# Patient Record
Sex: FEMALE | Race: WHITE
[De-identification: names, ages, dates, MRNs, and addresses within clinical notes are randomized per-mention and may not be internally consistent; named-entity substitution may affect disease eponyms.]

---

## 2018-05-05 ENCOUNTER — HOSPITAL ENCOUNTER (INPATIENT)
Dept: HOSPITAL 25 - ED | Age: 73
LOS: 3 days | Discharge: NURSING, REHAB OR LONG-TERM CARE | DRG: 464 | End: 2018-05-08
Attending: INTERNAL MEDICINE | Admitting: INTERNAL MEDICINE
Payer: MEDICARE

## 2018-05-05 DIAGNOSIS — I69.320: ICD-10-CM

## 2018-05-05 DIAGNOSIS — B96.5: ICD-10-CM

## 2018-05-05 DIAGNOSIS — E66.01: ICD-10-CM

## 2018-05-05 DIAGNOSIS — Q27.39: ICD-10-CM

## 2018-05-05 DIAGNOSIS — Z82.49: ICD-10-CM

## 2018-05-05 DIAGNOSIS — E78.5: ICD-10-CM

## 2018-05-05 DIAGNOSIS — I69.351: ICD-10-CM

## 2018-05-05 DIAGNOSIS — N18.3: ICD-10-CM

## 2018-05-05 DIAGNOSIS — Z79.1: ICD-10-CM

## 2018-05-05 DIAGNOSIS — B95.2: ICD-10-CM

## 2018-05-05 DIAGNOSIS — B96.4: ICD-10-CM

## 2018-05-05 DIAGNOSIS — I48.0: ICD-10-CM

## 2018-05-05 DIAGNOSIS — G47.33: ICD-10-CM

## 2018-05-05 DIAGNOSIS — T84.625A: Primary | ICD-10-CM

## 2018-05-05 DIAGNOSIS — Z88.8: ICD-10-CM

## 2018-05-05 DIAGNOSIS — Z96.653: ICD-10-CM

## 2018-05-05 DIAGNOSIS — Z79.01: ICD-10-CM

## 2018-05-05 DIAGNOSIS — E03.9: ICD-10-CM

## 2018-05-05 DIAGNOSIS — Z66: ICD-10-CM

## 2018-05-05 DIAGNOSIS — Z79.891: ICD-10-CM

## 2018-05-05 DIAGNOSIS — M54.5: ICD-10-CM

## 2018-05-05 DIAGNOSIS — Z79.899: ICD-10-CM

## 2018-05-05 DIAGNOSIS — Z99.3: ICD-10-CM

## 2018-05-05 DIAGNOSIS — M19.90: ICD-10-CM

## 2018-05-05 DIAGNOSIS — E11.42: ICD-10-CM

## 2018-05-05 DIAGNOSIS — E11.22: ICD-10-CM

## 2018-05-05 DIAGNOSIS — T81.30XA: ICD-10-CM

## 2018-05-05 DIAGNOSIS — I12.9: ICD-10-CM

## 2018-05-05 DIAGNOSIS — E11.621: ICD-10-CM

## 2018-05-05 DIAGNOSIS — L97.418: ICD-10-CM

## 2018-05-05 DIAGNOSIS — K59.00: ICD-10-CM
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
## 2018-05-05 DIAGNOSIS — Z79.4: ICD-10-CM

## 2018-05-05 DIAGNOSIS — Z82.3: ICD-10-CM

## 2018-05-05 LAB
BASOPHILS # BLD AUTO: 0.1 10^3/UL (ref 0–0.2)
EOSINOPHIL # BLD AUTO: 0.5 10^3/UL (ref 0–0.6)
HCT VFR BLD AUTO: 36 % (ref 35–47)
HGB BLD-MCNC: 11.8 G/DL (ref 12–16)
INR PPP/BLD: 2.4 (ref 0.77–1.02)
LYMPHOCYTES # BLD AUTO: 1.3 10^3/UL (ref 1–4.8)
MCH RBC QN AUTO: 30 PG (ref 27–31)
MCHC RBC AUTO-ENTMCNC: 33 G/DL (ref 31–36)
MCV RBC AUTO: 93 FL (ref 80–97)
MONOCYTES # BLD AUTO: 0.6 10^3/UL (ref 0–0.8)
NEUTROPHILS # BLD AUTO: 5.2 10^3/UL (ref 1.5–7.7)
NRBC # BLD AUTO: 0 10^3/UL
NRBC BLD QL AUTO: 0.1
PLATELET # BLD AUTO: 181 10^3/UL (ref 150–450)
RBC # BLD AUTO: 3.86 10^6/UL (ref 4–5.4)
WBC # BLD AUTO: 7.7 10^3/UL (ref 3.5–10.8)

## 2018-05-05 PROCEDURE — 88300 SURGICAL PATH GROSS: CPT

## 2018-05-05 PROCEDURE — 87640 STAPH A DNA AMP PROBE: CPT

## 2018-05-05 PROCEDURE — 99285 EMERGENCY DEPT VISIT HI MDM: CPT

## 2018-05-05 PROCEDURE — 87040 BLOOD CULTURE FOR BACTERIA: CPT

## 2018-05-05 PROCEDURE — 87073 CULTURE BACTERIA ANAEROBIC: CPT

## 2018-05-05 PROCEDURE — 85025 COMPLETE CBC W/AUTO DIFF WBC: CPT

## 2018-05-05 PROCEDURE — 85610 PROTHROMBIN TIME: CPT

## 2018-05-05 PROCEDURE — 87070 CULTURE OTHR SPECIMN AEROBIC: CPT

## 2018-05-05 PROCEDURE — 86140 C-REACTIVE PROTEIN: CPT

## 2018-05-05 PROCEDURE — 87205 SMEAR GRAM STAIN: CPT

## 2018-05-05 PROCEDURE — 71045 X-RAY EXAM CHEST 1 VIEW: CPT

## 2018-05-05 PROCEDURE — 87641 MR-STAPH DNA AMP PROBE: CPT

## 2018-05-05 PROCEDURE — 93005 ELECTROCARDIOGRAM TRACING: CPT

## 2018-05-05 PROCEDURE — 36415 COLL VENOUS BLD VENIPUNCTURE: CPT

## 2018-05-05 PROCEDURE — 87186 SC STD MICRODIL/AGAR DIL: CPT

## 2018-05-05 PROCEDURE — 80053 COMPREHEN METABOLIC PANEL: CPT

## 2018-05-05 PROCEDURE — 87077 CULTURE AEROBIC IDENTIFY: CPT

## 2018-05-05 PROCEDURE — 83605 ASSAY OF LACTIC ACID: CPT

## 2018-05-05 PROCEDURE — 80048 BASIC METABOLIC PNL TOTAL CA: CPT

## 2018-05-05 RX ADMIN — INSULIN LISPRO SCH UNIT: 100 INJECTION, SOLUTION INTRAVENOUS; SUBCUTANEOUS at 21:54

## 2018-05-05 RX ADMIN — ATORVASTATIN CALCIUM SCH MG: 40 TABLET, FILM COATED ORAL at 21:54

## 2018-05-05 RX ADMIN — DOCUSATE SODIUM SCH MG: 100 CAPSULE, LIQUID FILLED ORAL at 21:54

## 2018-05-05 RX ADMIN — WATER SCH NOTE: 100 INJECTION, SOLUTION INTRAVENOUS at 21:55

## 2018-05-05 RX ADMIN — HEPARIN SODIUM SCH UNITS: 5000 INJECTION INTRAVENOUS; SUBCUTANEOUS at 21:55

## 2018-05-05 NOTE — RAD
INDICATION:  Left ankle deformity.



COMPARISON: Comparison is made with a prior study from April 11, 2017.



TECHNIQUE: 3 views of the left ankle were obtained.



FINDINGS:  There is diffuse soft tissue swelling. The patient is status post operative

reduction internal fixation. There are metallic plates present along the distal tibia and

fibula transfixed with multiple screws. There is medial dislocation of the talus relative

to the tibia which has progressed slightly from the prior study. The metallic plate

present along the medial aspect of the tibia is eroding into the dome of the talus which

appears similar to the prior study. There is a chronic ununited bony density medial to the

talus likely representing an old fracture fragment which is also unchanged possibly

arising from the medial malleolus.



IMPRESSION:  

1. DIFFUSE SOFT TISSUE SWELLING.

2. MEDIAL DISLOCATION OF THE TALUS RELATIVE TO THE TIBIA WHICH HAS PROGRESSED FROM THE

PRIOR STUDY.

3. EROSION OF THE MEDIAL SURGICAL PLATE INTO THE DOME OF THE TALUS SIMILAR TO THE PRIOR

EXAM.

4. CONSIDER AN UNDERLYING INFECTIOUS PROCESS.

## 2018-05-05 NOTE — HP
CC:  Dr. Khan; Dr. Spivey; Dr. Dodson; Dr. Alex

 

HISTORY AND PHYSICAL:

 

DATE OF ADMISSION:  05/05/18

 

PRIMARY CARE PROVIDER:  Maryam Dodson DO from formerly [facility] as well as Dr. Alex from formerly [facility].

 

CHIEF COMPLAINT:  Left ankle wound.

 

HISTORY OF PRESENT ILLNESS:  Monique Pichardo is a 73-year-old female with history 
of paroxysmal atrial fibrillation as well as expressive aphasia due to 
cardioembolic stroke in 2016, who is status post ORIF of the left ankle also 
noted in 2016.  The patient stated that she had been having more swelling of 
the left ankle and she noted today that her ankle had a wound and you could see 
hardware through that. The patient stated that she had been by Dr. Khan 
recently within approximately 2 weeks for a sore on the bottom of her right foot
, but he did not mention  of  any abnormalities of the left ankle at this point
- as per patient.  Her left ankle has major postsurgical abnormality/deformity 
and that has been chronic.  She has had chronic problems with ambulation  and  
now she is basically nonambulatory.  She has had no problems with fevers and 
she had been feeling at her baseline apart from that, but once again the family 
noted that the wound was opened today.

 

She did state that she gained approximately 15 pounds in the past 4 to 5 months.

 

PAST MEDICAL HISTORY:

1.  History of hypertension.

2.  Diabetes.

3.  History of atrial fibrillation.

4.  History of GI bleed while on Xarelto.  The patient also has history of 
small bowel AVMs.

5.  History of cardioembolic stroke in 2016 with residual mild aphasia and 
right sided weakness

6.  History of ORIF of the left ankle in September of 2016 by Dr. Khan.

7.  Hyperlipidemia.

8.  Obesity.

9.  Chronic back pain.

10.  Hypothyroidism.

11.  History of obstructive sleep apnea.

12.  History of bilateral total knee replacements and revision of the right 
knee.

13.  History of laminectomy at L3-L4.

 

MEDICATIONS:  At [facility] where the patient is resident of 
include:

 

1.  Coumadin 2 mg daily, decreased due to high INR just 3 days ago.

2.  Lidocaine patch 1 patch daily on the left upper back.

3.  Milk of magnesia on a p.r.n. basis.

4.  Vitamin D3 50,000 units monthly.

5.  Toprol XL 25 mg daily.

6.  Levothyroxine 125 mcg daily.

7.  Insulin lispro 4 units subcutaneously in a.m.

8.  Insulin Lantus 26 units daily.

9.  Furosemide 20 mg daily.

10.  Ferrous sulfate 325 mg daily.

11.  Baclofen 5 mg every 4 hours p.r.n.

12.  Lipitor 40 mg daily.

13.  Oxycodone 5 mg every 8 hours p.r.n.

14.  Insulin lispro 6 units b.i.d. with lunch and dinner.

15.  Vitamin C 500 mg b.i.d.

16.  Amiodarone 200 mg daily.

17.  Coumadin 2 mg q.p.m.

18.  MiraLAX 17 g daily.

19.  Acetaminophen 1000 mg at bedtime.

 

ALLERGIES:  Include DILAUDID and XARELTO.  The patient had a GI bleed while on 
XARELTO.

 

FAMILY HISTORY:  Positive for father with history of MI and CVA.

 

SOCIAL HISTORY:  The patient is a long-term resident of [facility]. Her surrogate decision makers are her brother and sister.  She is DNR.

 

REVIEW OF SYSTEMS:  Positive for chronic right bottom of the foot wound.  
Positive for numbness of bilateral feet, which is chronic.  The patient also 
has positive expressive aphasia and chronic right sided weakness due to CVA in 
2016  She is unable to understand everything but has problems with verbalizing 
sometimes.  Positive for chronic lower back.  Positive for 15-pound weight 
gain.  Positive for constipation.  All the remaining 12 systems were reviewed 
with the patient and the patient's family and were otherwise negative.

 

                               PHYSICAL EXAMINATION

 

GENERAL:  The patient is a pleasant 73-year-old female with a BMI of 51.  The 
patient is in no acute distress.  Alert, awake, and oriented x3.  Please note 
that the patient has mixed aphasia.  Sometimes, she has difficulties naming 
objects.

 

VITAL SIGNS:  Blood pressure of 187/93, heart rate of 49 and regular, 
respiratory rate 14, oxygen saturation 96% on room air, temperature of 98.3.

 

HEENT:  Head:  Atraumatic, normocephalic.  Eyes:  Pupils are equal, reactive to 
light and accommodation.  Oropharynx clear.  Mucosa moist.

 

NECK:  Supple.  No JVD.  No bruits bilaterally.

 

RESPIRATORY:  Clear to auscultation bilaterally.

 

CARDIOVASCULAR:  Regular rate and rhythm.  No murmur.

 

ABDOMEN:  Soft, nontender.  Bowel sounds are present in all 4 quadrants.

 

EXTREMITIES:  There is trace bilateral ankle edema.  Pulses +2 bilaterally.  
There is no clubbing or cyanosis. Left ankle with chronic appearing post 
surgical deformity.

 

NEURO EVALUATION:  The patient has mixed aphasia, but is able to make her needs 
known.  She has slight R-sided facial droop and R-sided weakness more than 
left.  Bilateral lower extremities are deconditioned and weak..  Both of the 
lower extremities, she is able to raise against gravity; however, a short 
period of time only.

 

PSYCHIATRIC EVALUATION:  The patient oriented x3 with no evidence of anxiety or 
depression.

 

SKIN:  On evaluation of the skin, the patient has ecchymotic spots on first and 
second toes of bilateral feet that appeared to be posttraumatic.  She has an 
ecchymotic bruise on the medial aspect of the upper left calf that appears to 
be at least several days old.  Overlying the left lateral malleolus, there is 
what appears to be a slit like open area of approximately 3 cm in length and 
approximately 0.5 cm in width, which exposed connective tissue likely related 
to the joint capsule.  There is also a small area of opening that shows exposed 
metalware.  Overall, the area is not draining, no erythema, and no evidence of 
cellulitis.  It resembles an area of an old dehisced wound.  On the bottom of 
the right foot laterally, the patient has a small area of what appears to be 
decubitus ulcer of approximately 1 cm, circular crater- like opening 
unstageable due to slough on the bottom.  The ulcer does not appear to be deep.

 

 DIAGNOSTIC STUDIES/LAB DATA:  Laboratory data showed white blood cell count of 
7.7, hemoglobin is 11.8, hematocrit of 36, and platelets 181.

 

INR was 2.4.

 

Sodium was 140, potassium 4.7, chloride 107, carbon dioxide 26, BUN 50, 
creatinine 1.81, which shows the patient's chronic kidney disease.

 

Portable chest x-ray, impression:  "Small right basilar infiltrate."  Please 
note that the patient has no symptoms of shortness of breath or cough.

 

Ankle x-ray, impression:  "Diffuse soft tissue swelling.  Medial dislocation of 
the talus relative to the tibia, which has progressed from the prior study from 
04/11/17.  Erosion of the medial surgical plate into the dome of the talus 
similar to the prior exam.  Consider an underlying infectious process."

 

ASSESSMENT AND PLAN:

1.  In regards to the patient's ankle wound, it has an exposed hardware.  At 
this point, I discussed briefly with Dr. Spivey, who stated that the patient 
likely will need a surgical intervention.  The patient's INR is therapeutic and 
she needs to have her Coumadin reversed with vitamin K.  She is going to 
receive a dose of vitamin K p.o. today.  Due to that that she is not toxic 
appearing and she has no marked leukocytosis and there is no evidence of 
drainage form the wound, I do not believe that the wound needs to be 
immediately explored.  Dr. Spivey will see the patient in consultation in the 
morning.  For the time being, the patient is going to be treated with Ancef 
intravenously.

2.  In regards to the patient's diabetes, the patient is going to be continued 
on her insulin Lantus as well as insulin sliding scale.

3.  The patient has chronic kidney disease stage 3 due to diabetes.  It is 
chronic with creatinine at baseline.

4.  In regards to the patient's atrial fibrillation, which is paroxysmal. 
Currently, the patient is in normal sinus rhythm.  She is going to be continued 
on amiodarone and metoprolol.

5.  For her hypertension, the patient is going to be placed on continuation of 
metoprolol and amiodarone.  Hydralazine is going to be utilized as needed for 
uncontrolled hypertension.  I believe the patient has elevated systolic blood 
pressures due to stress of being in the emergency department.

6.  For the patient's DVT prophylaxis, the patient currently is anticoagulated.

7.  The patient's code status is do not resuscitate and a MOLST was signed.

 

TIME SPENT:  Approximately 75 minutes were spent on admission of this patient, 
more than half of that time was spent face-to-face with the patient during the 
interview and physical exam.

 

 

 

[id number] #: [id number]

RODRIGUEZ

## 2018-05-05 NOTE — RAD
INDICATION:  Possible sepsis.



COMPARISON:  Correlation is made with a prior study from December 09, 2016.



TECHNIQUE: A portable view of the chest was obtained.



FINDINGS: Cardiac and mediastinal contours appear to be within normal limits.



There is a small infiltrate at the right lung base. The lungs are otherwise clear. No

pleural effusion is seen.



IMPRESSION:  SMALL RIGHT BASILAR INFILTRATE.

## 2018-05-05 NOTE — ED
Aristeo ENGEL Elizabeth, scribed for Júnior Dela Cruz MD on 05/05/18 at 1431 .





Lower Extremity





- HPI Summary


HPI Summary: 


This patient is a 73 year old F presenting to [facility] with a chief complaint of 

an open wound to her left ankle. Patient notes she had surgery to repair a 

deformity to her left ankle 14-15 months ago and the hardware is visible 

through the wound. Symptoms aggravated by nothing. Symptoms alleviated by 

nothing.


Patient reports inability to ambulate. Patient resides at [facility].





- History of Current Complaint


Chief Complaint: EDExtremityLower


Stated Complaint: WOUND


Time Seen by Provider: 05/05/18 13:38


Hx Obtained From: Patient, Family/Caretaker


Onset of Pain: Prior to Arrival


Onset/Duration: Still Present


Severity Currently: Mild


Pain Intensity: 0


Timing: Constant


Location: Is Discrete @ - left ankle


Aggravating Factor(s): Nothing


Alleviating Factor(s): Nothing





- Allergies/Home Medications


Allergies/Adverse Reactions: 


 Allergies











Allergy/AdvReac Type Severity Reaction Status Date / Time


 


hydromorphone Allergy  Altered Verified 05/05/18 13:35





   Mental  





   Status  


 


rivaroxaban [From Xarelto] Allergy  Bleeding Verified 05/05/18 13:35











Home Medications: 


 Home Medications





Acetaminophen [Tylenol Extra Strength] 1,000 mg PO BEDTIME 05/05/18 [History 

Confirmed 05/05/18]


Ascorbic Acid TAB* [Vitamin C  TAB*] 500 mg PO BID 05/05/18 [History Confirmed 

05/05/18]


Atorvastatin* [Lipitor 80 MG*] 40 mg PO QPM 05/05/18 [History Confirmed 05/05/18

]


Baclofen TAB* [Lioresal TAB*] 5 mg PO Q8H PRN 05/05/18 [History Confirmed 05/05/ 18]


Cholecalciferol TAB* [Vitamin D TAB*] 50,000 unit PO MONTHLY 05/05/18 [History 

Confirmed 05/05/18]


Ferrous Sulfate TAB* 325 mg PO DAILY 05/05/18 [History Confirmed 05/05/18]


Furosemide TAB* [Lasix TAB*] 20 mg PO DAILY 05/05/18 [History Confirmed 05/05/18

]


Insulin LISPRO* [HumaLOG*] 4 unit SUBCUT QAM 05/05/18 [History Confirmed 05/05/ 18]


Insulin LISPRO* [HumaLOG*] 6 unit SUBCUT BID WITH MEALS 05/05/18 [History 

Confirmed 05/05/18]


Levothyroxine TAB* [Synthroid TAB*] 125 mcg PO DAILY 05/05/18 [History 

Confirmed 05/05/18]


Lidocaine 4% TOPICAL* [Xylocaine Topical 4%*] 1 patch TOPICAL DAILY 05/05/18 [

History Confirmed 05/05/18]


Magnesium Hydroxide LIQ* [Milk of Magnesia LIQ*] 30 ml PO Q6H PRN 05/05/18 [

History Confirmed 05/05/18]


Metoprolol Succinate XL TAB* [Toprol XL TAB*] 25 mg PO DAILY 05/05/18 [History 

Confirmed 05/05/18]


Polyethylene Glycol 3350* [Miralax*] 17 gm PO DAILY 05/05/18 [History Confirmed 

05/05/18]


Warfarin TAB(*) [Coumadin TAB(*)] 2 mg PO QPM 05/05/18 [History Confirmed 05/05/ 18]











PMH/Surg Hx/FS Hx/Imm Hx


Endocrine/Hematology History: Reports: Hx Diabetes - IDDM, Hx Thyroid Disease - 

hypothyroid, Hx Anemia


Cardiovascular History: Reports: Hx Atrial Fibrillation, Hx Coronary Artery 

Disease, Hx Hypercholesterolemia, Hx Hypertension, Other Cardiovascular Problems

/Disorders - A Fib, myocardial stenosis


   Denies: Hx Pacemaker/ICD


Respiratory History: Reports: Hx Asthma, Hx Sleep Apnea


GI History: Reports: Hx Gastroesophageal Reflux Disease, Hx Gastrointestinal 

Bleed, Other GI Disorders - GI BLEED


 History: Reports: Hx Kidney Infection


   Denies: Hx Dialysis, Hx Renal Disease


Musculoskeletal History: Reports: Hx Arthritis, Other Musculoskeletal History - 

CHRONIC BACK PAIN, bilat knee replacements, recent L ankle fracture


Sensory History: Reports: Hx Cataracts - Implant surgery L eye 2008, Hx 

Contacts or Glasses


   Denies: Hx Hearing Aid, Hx Hearing Problem


Opthamlomology History: Reports: Hx Cataracts - Implant surgery L eye 2008, Hx 

Contacts or Glasses


Neurological History: Reports: Hx CVA - with right-sided sequela, Hx Transient 

Ischemic Attacks (TIA) - "Mini-strokes" 20 yrs ago


   Denies: Hx Dementia, Hx Developmental Delay, Hx Headaches, Hx Migraine, Hx 

Nerve Disease, Hx Seizures, Hx Spinal Cord Injury, Other Neuro Impairments/

Disorders


Psychiatric History: Reports: Hx Anxiety, Hx Depression, Other Psychiatric 

Issues/Disorders - claustrophobia


   Denies: Hx Panic Disorder





- Cancer History


Hx Chemotherapy: No


Hx Radiation Therapy: No





- Surgical History


Surgery Procedure, Year, and Place: R knee replacement 2011, L3/L4 laminectomy.

  left knee replacement 2013.  L ankle ORIF 8/2/16


Hx Anesthesia Reactions: No





- Immunization History


Date of Tetanus Vaccine: 2011


Date of Influenza Vaccine: Fall 2012


Infectious Disease History: Unable to Obtain/Confirm


Infectious Disease History: Reports: Hx Shingles - pt states about 6 months ago

, ON HER BACK


   Denies: Hx Clostridium Difficile, Hx Hepatitis, Hx Human Immunodeficiency 

Virus (HIV), Hx of Known/Suspected MRSA, Hx Tuberculosis, History Other 

Infectious Disease, Traveled Outside the [place] in Last 30 Days





- Family History


Known Family History: Positive: Hypertension, Other - lung cancer (father), 

brain cancer (brother)


   Negative: Cardiac Disease, Diabetes





- Social History


Alcohol Use: None


Substance Use Type: Reports: None


Smoking Status (MU): Former Smoker


Type: Cigarettes


Have You Smoked in the Last Year: No





Review of Systems


Negative: Fever


Negative: Epistaxis


Negative: Abdominal Pain


Positive: Other - pain in left ankle


All Other Systems Reviewed And Are Negative: Yes





Physical Exam





- Summary


Physical Exam Summary: 





Appearance: The patient is well-nourished in no acute distress and in no acute 

pain.


 


Skin: The skin is warm and dry and skin color reflects adequate perfusion.


 


HEENT: The head is normocephalic and atraumatic. The pupils are equal and 

reactive. The conjunctivae are clear and without drainage. Nares are patent and 

without drainage. Mouth reveals moist mucous membranes and the throat is 

without erythema and exudate. The external ears are intact. The ear canals are 

patent and without drainage. The tympanic membranes are intact.


 


Neck: the neck is supple with full range of motion and non-tender. There are no 

carotid bruits. There is no neck vein distension.


 


Respiratory: Chest is non-tender. Lungs are clear to auscultation and breath 

sounds are symmetrical and equal.


 


Cardiovascular: Heart is regular rate and rhythm. There is no murmur or rub 

auscultated. There is no peripheral edema and pulses are symmetrical and equal.


 


Abdomen: The abdomen is soft and non-tender. There are normal bowel sounds 

heard in all four quadrants and there is no organomegaly palpated.


 


Musculoskeletal: There is no back tenderness noted. Extremities are non-tender. 

There is good capillary refill. There is no peripheral edema or calf tenderness 

elicited. Varus deformity of left ankle. The left ankle is lax with a 2-3cm 

open wound over the lateral aspect of the ankle, through which hardware is 

visible.





 


Neurological: Patient is alert and oriented to person, place and time. The 

patient has symmetrical motor strength in all four extremities. Cranial nerves 

are grossly intact. Deep tendon reflexes are symmetrical and equal in all four 

extremities.


 


Psychiatric: The patient has an appropriate affect and does not exhibit any 

anxiety or depression.





Triage Information Reviewed: Yes


Vital Signs On Initial Exam: 


 Initial Vitals











Pulse Pulse Ox


 


 53   96 


 


 05/05/18 13:29  05/05/18 13:29











Vital Signs Reviewed: Yes





Diagnostics





- Vital Signs


 Vital Signs











  Temp Pulse Resp BP Pulse Ox


 


 05/05/18 14:00   48  19   96


 


 05/05/18 13:53  98.7 F  54  14  120/75  95


 


 05/05/18 13:30   50    97


 


 05/05/18 13:29   53    96














- Laboratory


Lab Results: 


 Lab Results











  05/05/18 05/05/18 05/05/18 Range/Units





  14:39 14:39 14:39 


 


WBC  7.7    (3.5-10.8)  10^3/ul


 


RBC  3.86 L    (4.0-5.4)  10^6/ul


 


Hgb  11.8 L    (12.0-16.0)  g/dl


 


Hct  36    (35-47)  %


 


MCV  93    (80-97)  fL


 


MCH  30    (27-31)  pg


 


MCHC  33    (31-36)  g/dl


 


RDW  15    (10.5-15)  %


 


Plt Count  181    (150-450)  10^3/ul


 


MPV  8.5    (7.4-10.4)  um3


 


Neut % (Auto)  68.0    (38-83)  %


 


Lymph % (Auto)  17.5 L    (25-47)  %


 


Mono % (Auto)  7.6 H    (0-7)  %


 


Eos % (Auto)  5.9    (0-6)  %


 


Baso % (Auto)  1.0    (0-2)  %


 


Absolute Neuts (auto)  5.2    (1.5-7.7)  10^3/ul


 


Absolute Lymphs (auto)  1.3    (1.0-4.8)  10^3/ul


 


Absolute Monos (auto)  0.6    (0-0.8)  10^3/ul


 


Absolute Eos (auto)  0.5    (0-0.6)  10^3/ul


 


Absolute Basos (auto)  0.1    (0-0.2)  10^3/ul


 


Absolute Nucleated RBC  0    10^3/ul


 


Nucleated RBC %  0.1    


 


Sodium   140   (139-145)  mmol/L


 


Potassium   4.7   (3.5-5.0)  mmol/L


 


Chloride   107   (101-111)  mmol/L


 


Carbon Dioxide   26   (22-32)  mmol/L


 


Anion Gap   7   (2-11)  mmol/L


 


BUN   50 H   (6-24)  mg/dL


 


Creatinine   1.81 H   (0.51-0.95)  mg/dL


 


Est GFR ( Amer)   35.1   (>60)  


 


Est GFR (Non-Af Amer)   27.3   (>60)  


 


BUN/Creatinine Ratio   27.6 H   (8-20)  


 


Glucose   143 H   ()  mg/dL


 


Lactic Acid    0.8  (0.5-2.0)  mmol/L


 


Calcium   9.2   (8.6-10.3)  mg/dL


 


Total Bilirubin   0.30   (0.2-1.0)  mg/dL


 


AST   11 L   (13-39)  U/L


 


ALT   10   (7-52)  U/L


 


Alkaline Phosphatase   63   ()  U/L


 


C-Reactive Protein   3.75   (< 5.00)  mg/L


 


Total Protein   7.1   (6.4-8.9)  g/dL


 


Albumin   3.5   (3.2-5.2)  g/dL


 


Globulin   3.6   (2-4)  g/dL


 


Albumin/Globulin Ratio   1.0   (1-3)  











Result Diagrams: 


 05/05/18 14:39





 05/05/18 14:39


Lab Statement: Any lab studies that have been ordered have been reviewed, and 

results considered in the medical decision making process.





- Radiology


  ** Ankle Left 3+VWS


Xray Interpretation: Positive (See Comments)


Radiology Interpretation Completed By: Radiologist





  ** CXR


Xray Interpretation: Positive (See Comments) - IMPRESSION:  SMALL RIGHT BASILAR 

INFILTRATE. Dr. Dela Cruz has reviewed this report.


Radiology Interpretation Completed By: Radiologist





Lower Extremity Course/Dx





- Course


Course Of Treatment: Ms. Pichardo presented with a concern for an open wound over 

her left ankle prosthesis laterally.  She is at the nursing home and says that 

they take her brace off at night so she thinks the wound is new. She had 

surgery 14 months ago for a trimalleolar fracture and has had a varus deformity 

since and does not ambulate.   She has a 2-3 cm wound with a small amount of 

thick pus laterally over the ankle, the foot moves freely in relation to the 

ankle and the prosthesis can be seen through the wound. She has normal vitals, 

her labs are normal and her x-ray is read by the radiologist as concerning for 

infection.  Dr. Spivey requests that we have the hospitalist service admit the 

the patient and she will take her to the OR to clean it out tomorrow.  She was 

given Ancef here in the ED.





- Diagnoses


Provider Diagnoses: 


 Wound dehiscence








- Critical Care Time


Critical Care Time: 30-74 min





Discharge





- Sign-Out/Discharge


Documenting (check all that apply): Discharge/Admit/Transfer





- Discharge Plan


Condition: Stable


Disposition: ADMITTED TO [facility]


Referrals: 


Alo Espinosa MD [Primary Care Provider] - 





- Billing Disposition and Condition


Condition: STABLE


Disposition: [facility]





The documentation as recorded by the Aristeo greer Elizabeth accurately 

reflects the service I personally performed and the decisions made by me, Júnior Dela Cruz MD.

## 2018-05-05 NOTE — XMS REPORT
Monique Plascencia

 Created on:2018



Patient:Monique Plascencia

Sex:Female

:1945

External Reference #:[id number]





Demographics







 Address  [address] [place] [address]

 

 Home Phone  [phone]

 

 Mobile Phone  [phone]

 

 Email Address  [email or web address]

 

 Preferred Language  English

 

 Marital Status  Not  Or 

 

 Tenriism Affiliation  Unknown

 

 Race  White

 

 Ethnic Group  Not  Or 









Author







 Organization  [facility]

 

 Address  [address] [address]

 

 Phone  [phone]









Support







 Name  Relationship  Address  Phone

 

 Agustina Xavier  Unavailable  Unavailable  [phone]

 

 Elva Plascencia  Unavailable  Unavailable  [phone]

 

 Matt Salmeron  Unavailable  Unavailable  [phone]

 

 Whitney Sparks  Unavailable  Unavailable  [phone]

 

 Suzie Jesi  Unavailable  Unavailable  [phone]









Care Team Providers







 Name  Role  Phone

 

 Alo Espinosa MD  Care Team Information   Unavailable

 

 Maryam Dodson DO  Primary Care Physician  Unavailable









Payers







 Type  Date  Identification Numbers  Payment Provider  Subscriber

 

 Medicare Primary  Effective:  Policy Number:  Medicare  Monique Plascencia



   1995  [id number]    









 PayID: [id number]  [address]









 [place] [address]









 Medigap Part B  Effective: 2013  Policy Number:  BS Facets  Monique Plascencia



     [id number]    









 Expires: 2017  PayID: [id number]  [address] [address]









 [place] [address]









 Medigap Part B  Effective: 2017  Policy Number: [id number]  Medicaid  
Monique Plascencia









 Group Name: 1   [address]

 

 PayID: [id number]  [place] [address]







Problems







 Date  Description  Provider  Status

 

 Onset: 2013  Atrial fibrillation  Kaz Mayes M.D.  Active

 

 Onset: 2013  Chest pain  Kaz Mayes M.D.  Active

 

 Onset: 2013  Preoperative cardiovascular  Kaz Mayes M.D.  Active



   examination    

 

 Onset: 2013  Abnormal results of cardiovascular  Kaz Mayes M.D.  
Active



   function studies    







Family History







 Date  Family Member(s)  Problem(s)  Comments

 

   General  Lung Cancer  father

 

   General  Arthritis  bother, sister

 

   General  Pulmonary Embolism (Pe)  father







Social History







 Type  Date  Description  Comments

 

 Lives With    Assisted living  

 

 Occupation    Retired  

 

 ETOH Use    Denies alcohol use  

 

 Smoking    Patient is a former smoker  pack and half a day, quit in



       

 

 Recreational Drug Use    Denies Drug Use  

 

 Exercise Type/Frequency    Does not exercise  







Allergies, Adverse Reactions, Alerts







 Date  Description  Reaction  Status  Severity  Comments

 

 2013  Dilaudid    active    







Medications







 Medication  Date  Status  Form  Strength  Qnty  SIG  Indications  Ordering



                 Provider

 

 Oxycodone HCL    Active        up to 6 tabs    Unknown



   /0000          po qd prn    

 

 Gemfibrozil    Active  Tablets  600mg  180ta  1 po bid    Unknown



   /        bs      

 

 Furosemide    Active  Tablets  20mg  90tab  1 po qd    Unknown



   /0000        s      

 

 Lantus    Active  Solution  100Unit/M  6Vial  sliding    Unknown



   /      L  s  scale    

 

 Atorvastatin    Active  Tablets  40mg  90tab  take 1    Unknown



 Calcium  /        s  tablet at    



             bedtime    

 

 Citalopram    Active  Tablets  20mg  30tab  1 po qd    Unknown



 Hydrobromide  /0000        s      

 

 Metoprolol    Active  Tablets  25mg    1/2 tab by    Unknown



 Tartrate  /0000          mouth twice    



             a day    

 

 Amiodarone HCL    Active  Tablets  200mg    1 by mouth    Unknown



   /          every day    

 

 Warfarin Sodium    Active  Tablets  2.5mg        Unknown



   /              

 

 Humalog    Active  Solution  100Unit/M    4u subq 1x a    Unknown



   /0000    Cartridge  L    day 6u subq    



             2x a day    

 

 Milk Of    Active  Suspension  400mg/5ML    30    Unknown



 Magnesia  /0000          milliliters    



             by mouth    



             every day at    



             bedtime as    



             needed for    



             constipation    

 

 Skin Prep Wipes    Active            Unknown



   /0000              

 

 Tylenol Extra    Active  Tablets  500mg    2 by mouth    Unknown



 Strength  /0000          as needed    

 

 Vitamin C    Active  Capsules  500-400mg    1 by mouth    Unknown



   /      -Unit    every day    



 W/Vitamin E                

 

 Vitamin D3    Active  Tablets  15009Oaol    one by mouth    Unknown



 Ultra Potency  /0000          once weekly    

 

 Miconazole    Active  Cream  2%    apply twice    Unknown



 Nitrate  /0000          a day until    



             clear    

 

 Baclofen  00/00  Active    5mg    q 8hrs    Unknown



   /0000              

 

 Calazime Skin    Active  Paste          Unknown



 Protectant/Oliv  /              



 amine                

 

 Hydrocortisone    Active  Ointment  1%        Unknown



   /0000              

 

                 

 

 Methocarbamol    Hx  Tablets  500mg  45tab  one to two    Herbie KAMINSKI



   /        s  up to qid    Jeet,



   -          prn for    M.D.



   10/30          spasm    



   /2013              

 

 Hydrocodone/Ace  10/19  Hx  Tablets  5-325mg  90tab  1-2 po qid    Herbie KAMINSKI



 taminophen          s  prn    Brittney aMrcano              M.DLaisha



   10/31              



   /2013              

 

 Oxycodone HCL    Hx  Tablets  5mg  90tab  1-2 po qid    Herbie KAMINSKI



   /        s  prBrittney Cruz M.D.



   10/19              



   /2010              

 

 Valium    Hx  Tablets  5mg  3tabs  1 po 2 hours    Herbie KAMINSKI



             prior to mri    Jeet,



   -          may take one    M.D.



   10/31          more q 1 hr    



   /          prn anxiety    

 

 Levothyroxine    Hx  Tablets  125mcg  90tab  1 po qd    Unknown



 Sodium  /0000        s      



   -              



   10/07              



   /2017              

 

 Omeprazole    Hx  Capsules DR  20mg  90cap  1 po qd    Unknown



   /0000        s      



   -              



   10/07              



   /2017              

 

 Multivitamins    Hx  Capsules    30cap  1 capsule    Unknown



   /0000        s  dawna;y    



   -              



   10/07              



   /2017              

 

 Gabapentin    Hx  Capsules  100mg  240ca  2 po bid    Unknown



   /0000        ps      



   -              



   10/07              



   /2017              

 

 B-12    Hx    1200mg    1 po qd    Unknown



   /0000              



   -              



   10/07              



   /2017              

 

 Glipizide    Hx  Tablets  5mg  60tab  1 po bid    Unknown



   /0000        s      



   -              



                 

 

 Aspirin Ec    Hx  Tablets DR  81mg  100ta  1 by mouth    Unknown



   /0000        bs  every day    



   -              



   10/07              



   /2017              

 

 Glucosamine    Hx  Capsules  1500Com    bid    Unknown



 Chondroitin  /0000              



 1500 Complex  -              



 Maximum  10/01              



 Strength  /2017              

 

 Celexa  00  Hx  Tablets  10mg    1 by mouth    Unknown



   /0000          every day    



   -              



   10/01              



   /2017              

 

 Docusate Sodium  00  Hx  Capsules  100mg    1 by mouth    Unknown



   /0000          twice a day    



   -              



   10/07              



   /2017              

 

 Humalog Mix    Hx  Suspension  (75-25)10    8 units sq    Unknown



 75/25  /0000      0Unit/ML    twice a day    



   -          with    



   10/17          breakfast    



   /2017          and dinner    

 

 Cipro    Hx  Tablets  500mg    1 by mouth    Unknown



   /0000          twice a day    



   -              



   10/07              



   /2017              

 

 Albuterol  00  Hx  Nebulizer  (2.5mg/3M    1 vial via    Unknown



 Sulfate  /0000      L) 0.083%    nebulizer 4    



   -          times daily    



   10/07          as needed    



                 







Medications Administered in Office







 Medication  Date  Status  Form  Strength  Qnty  SIG  Indications  Ordering



                 Provider

 

 Inj,  11/15/2  Administered  Injection          Kaz D.



 Regadenoson,  013              CAROL Mayes



 0.1 MG                

 

 Technetium TC  11/15/2  Administered  Injection          Kaz DLaisha



 99M  013              CAROL Mayes



 Tetrofosmin,                



 Per Unit Dose                



 Up To 40                



 Millicuries                







Vital Signs







 Date  Vital  Result  Comment

 

 2018  Height  65 inches  5'5"









 Heart Rate  66 /min  

 

 Respiratory Rate  20 /min  

 

 Body Temperature  96.1 F  

 

 Pain Level  0  









 2018  Heart Rate  49 /min  









 BP Systolic Sitting  134 mmHg  

 

 BP Diastolic Sitting  64 mmHg  

 

 Body Temperature  96.5 F  









 10/17/2017  Height  65 inches  5'5"









 Weight  229.00 lb  

 

 BP Systolic  115 mmHg  

 

 BP Diastolic  81 mmHg  

 

 Respiratory Rate  16 /min  

 

 Body Temperature  98.2 F  

 

 BMI (Body Mass Index)  38.1 kg/m2  









 2017  Height  65 inches  5'5"









 Weight  266.00 lb  

 

 BP Systolic  115 mmHg  

 

 BP Diastolic  83 mmHg  

 

 Body Temperature  97.7 F  

 

 Pain Level  5  

 

 BMI (Body Mass Index)  44.3 kg/m2  









 2017  Height  65 inches  5'5"









 Weight  266.00 lb  

 

 Heart Rate  72 /min  

 

 BP Systolic  130 mmHg  

 

 BP Diastolic  82 mmHg  

 

 Respiratory Rate  20 /min  

 

 Body Temperature  96.0 F  

 

 Pain Level  0  

 

 BMI (Body Mass Index)  44.3 kg/m2  









 2017  Height  65 inches  5'5"









 Weight  266.00 lb  

 

 Heart Rate  68 /min  

 

 Respiratory Rate  16 /min  

 

 Body Temperature  97.6 F  

 

 Pain Level  4  

 

 BMI (Body Mass Index)  44.3 kg/m2  









 2017  Height  65 inches  5'5"









 Weight  266.00 lb  

 

 Heart Rate  60 /min  

 

 BP Systolic  130 mmHg  

 

 BP Diastolic  68 mmHg  

 

 Respiratory Rate  16 /min  

 

 Pain Level  1  

 

 BMI (Body Mass Index)  44.3 kg/m2  









 01/10/2017  Height  65 inches  5'5"









 Weight  266.00 lb  

 

 Pain Level  0  

 

 BMI (Body Mass Index)  44.3 kg/m2  









 2016  Height  65 inches  5'5"









 Weight  266.00 lb  

 

 Respiratory Rate  18 /min  

 

 Pain Level  0  

 

 BMI (Body Mass Index)  44.3 kg/m2  









 2016  Respiratory Rate  17 /min  









 Body Temperature  98.8 F  

 

 Pain Level  0  









 11/15/2016  Height  65 inches  5'5"









 Weight  266.00 lb  

 

 Heart Rate  60 /min  

 

 Respiratory Rate  16 /min  

 

 Pain Level  0  

 

 BMI (Body Mass Index)  44.3 kg/m2  









 2016  Height  65 inches  5'5"









 Weight  266.00 lb  

 

 Pain Level  0  

 

 BMI (Body Mass Index)  44.3 kg/m2  









 10/25/2016  Height  65 inches  5'5"









 Weight  266.00 lb  

 

 Heart Rate  60 /min  

 

 Respiratory Rate  16 /min  

 

 Pain Level  0  

 

 BMI (Body Mass Index)  44.3 kg/m2  









 10/20/2016  Height  65 inches  5'5"









 Weight  266.00 lb  

 

 Pain Level  0  

 

 BMI (Body Mass Index)  44.3 kg/m2  









 10/13/2016  Height  65 inches  5'5"









 Weight  266.00 lb  

 

 Body Temperature  95.8 F  

 

 BMI (Body Mass Index)  44.3 kg/m2  









 10/06/2016  Height  65 inches  5'5"









 Weight  266.00 lb  

 

 Pain Level  2  

 

 BMI (Body Mass Index)  44.3 kg/m2  









 2016  Height  65 inches  5'5"









 Weight  266.00 lb  

 

 Heart Rate  56 /min  

 

 BP Systolic  120 mmHg  

 

 BP Diastolic  59 mmHg  

 

 BMI (Body Mass Index)  44.3 kg/m2  









 2014  Height  63 inches  5'3"









 Weight  280.00 lb  with shoes

 

 Heart Rate  64 /min  regular

 

 BP Systolic Sitting  102 mmHg  right arm large cuff

 

 BP Diastolic Sitting  58 mmHg  right arm large cuff

 

 BP Systolic Standing  100 mmHg  right arm large cuff

 

 BP Diastolic Standing  54 mmHg  right arm large cuff

 

 Respiratory Rate  18 /min  

 

 BMI (Body Mass Index)  49.6 kg/m2  









 2013  Height  63 inches  5'3"









 Weight  264.00 lb  

 

 Heart Rate  48 /min  

 

 BP Systolic  104 mmHg  Ra large cuff

 

 BP Diastolic  54 mmHg  Ra large cuff

 

 BP Systolic Sitting  104 mmHg  LA large cuff

 

 BP Diastolic Sitting  56 mmHg  LA large cuff

 

 BP Systolic Standing  100 mmHg  LA

 

 BP Diastolic Standing  54 mmHg  LA

 

 Respiratory Rate  16 /min  

 

 BMI (Body Mass Index)  46.8 kg/m2  







Results







 Test  Date  Test  Result  H/L  Range  Note

 

 Laboratory test  2016  Point of Care Glucose  209 mg/dL  High    1



 finding            

 

 Laboratory test  2016  Point of Care Glucose  182 mg/dL  High    2



 finding            

 

 Creatinine  2012  Creatinine  2.0 mg/dL  High  0.50-1.40  









 One Over Creatinine  0.50      

 

 eGFR Non-  24.9    &gt; 60  

 

 eGFR   32.0    &gt; 60  3









 Comp Metabolic Panel  2010  Sodium  137 mmol/L    135-145  









 Potassium  4.6 mmol/L    3.5-5.0  

 

 Chloride  106 mmol/L    101-111  

 

 Co2 (Carbon Dioxide)  22.0 mmol/L    22-32  

 

 Anion Gap  9.0 mmol/L    2-11  4

 

 Glucose  208 mg/dL  High    5

 

 BUN  32 mg/dL  High  6-24  

 

 Creatinine  1.70 mg/dL  High  0.50-1.40  

 

 One Over Creatinine  0.50      

 

 BUN/Creatinine Ratio  18.8    8-20  

 

 Calcium  8.8 mg/dL    8.1-9.9  6

 

 Total Protein  6.5 GM/DL    6.2-8.1  

 

 Albumin  3.2 GM/DL    3.2-5.2  

 

 Globulin  3.3 GM/DL    2-4  

 

 Albumin/Globulin Ratio  1.0    1-3  

 

 Bilirubin Total  0.5 mg/dL    0.4-1.5  7

 

 Alkaline Phosphatase  70 U/L      

 

 Alt (SGPT)  9 U/L  Low  14-54  

 

 Ast (Sgot)  33 U/L    12-42  

 

 eGFR Non-  32.0    &gt; 60  

 

 eGFR   38.7    &gt; 60  8









 CBC With Electronic Diff  2010  White Blood Count  10.6 CUMM    4.8-10.8
  









 Red Cell Count  2.75 CUMM  Low  4.2-5.4  

 

 Hemoglobin  8.6 g/dL  Low  12.0-16.0  

 

 Hematocrit  25 %  Low  35-47  

 

 Mean Corpuscular Volume  92 um3    79-97  

 

 Mean Corpuscular Hemoglob  31 pg    27-31  

 

 Mean Corpuscular HGB Cone  34 g/dL    32-36  

 

 Redcell Distribution WDTH  15 %    10.5-15  

 

 Platelet Count  252 CUMM    150-450  

 

 Mean Platelet Volume  7.3 um3  Low  7.4-10.4  









 Manual Differential  2010  Polysegmented Neutrophil  78 %    38-83  









 Lymphocyte  17 %  Low  25-47  

 

 Monocyte  4 %    0-13  

 

 Eosinophil  1 %    0-6  

 

 Absolute Neutrophil Count  8.2      

 

 RBC Morphology  NORMAL      









 Urinalysis W/Microscopic  2010  Ua Color  YELLOW    Yellow  









 Appearance-Urine  CLEAR    Clear  

 

 Specific Gravity-Ur  1.014    1.010-1.030  

 

 Esterase-Urine  2+    Negative  

 

 Nitrite  NEGATIVE    Negative  

 

 Urobilinogen-Ur-DIP  NEGATIVE    Negative  

 

 Protein-Urine  NEGATIVE    Negative  

 

 PH-Urine  5.5    5-9  

 

 Blood-Urine  NEGATIVE    Negative  

 

 Ketones-Urine  NEGATIVE    Negative  

 

 Bilirubin-Ur  NEGATIVE    Negative  

 

 Glucose-Urine  NEGATIVE    Negative  

 

 WBC-Urine  20-25    0-5  

 

 RBC-Urine  0-2    0-2  

 

 Epith Cells-Ur  FEW    None  

 

 Bacteria-Urine  4+    None  









 Urine Culture &amp;  2010  Urine Culture  ENTEROBACTER AER &lt;SEE      9



 Sensitivi    Sensitivi  NOTE&gt;      

 

 Anaerobic Culture  2010  Anaerobic Culture  NG5      10



 Bottle    Bottle        

 

 Blood Culture  2010  Aerobic Culture Bottle  NG5      11

 

 Ursc-1  2010  Ampicillin  &gt;=32      









 Amikacin  &lt;=2      

 

 Ciprofloxacin  &lt;=0.25      

 

 Ceftriaxone  8      

 

 Cefazolin  &gt;=64      

 

 Nitrofurantoin  64      

 

 Gentamicin  &lt;=1      

 

 Imipenem  2      

 

 Levofloxacin  &lt;=0.12      

 

 Trimeth-Sulfa  &lt;=20      

 

 Ceftazidime  16      

 

 Tigecycline  &lt;=0.5      

 

 Piperacillin/Tazobactam  8      









 Surgical Pathology  2010  Surgical Pathology  ---------------- &lt;SEE  
    12



       NOTE&gt;      

 

 Laboratory test  2010  Release Date  8/7/10      13, 14



 finding            

 

 Type And Screen  2010  Patient Blood Type  O POSITIVE      13









 Antibody Screen  NEGATIVE      13

 

 Specimen Discard Date  8/12/10      13, 15









 Basic Metabolic Panel  2010  Sodium  141 mmol/L    135-145  13









 Potassium  5.4 mmol/L  High  3.5-5.0  13

 

 Chloride  109 mmol/L    101-111  13

 

 Co2 (Carbon Dioxide)  22.0 mmol/L    22-32  13

 

 Anion Gap  10.0 mmol/L    2-11  13, 16

 

 Glucose  54 mg/dL  Low    13, 17

 

 BUN  49 mg/dL  High  6-24  13

 

 Creatinine  2.23 mg/dL  High  0.50-1.40  13

 

 One Over Creatinine  0.40      13

 

 BUN/Creatinine Ratio  22.0  High  8-20  13

 

 Calcium  10.2 mg/dL  High  8.1-9.9  13, 18

 

 eGFR Non-  23.4    &gt; 60  13

 

 eGFR   28.3    &gt; 60  13, 19









 Laboratory test finding  2010  PTT (Aptt)  29.4    25.15-38.53  13, 20

 

 Protime  2010  Inr  1.05  High  0.97-1.03  13, 21









 Protime  12.4 SEC  High  11.5-12.2  13, 22









 CBC With Electronic Diff  2010  White Blood Count  9.1 CUMM    4.8-10.8  
13









 Red Cell Count  2.77 CUMM  Low  4.2-5.4  13

 

 Hemoglobin  8.6 g/dL  Low  12.0-16.0  13

 

 Hematocrit  26 %  Low  35-47  13

 

 Mean Corpuscular Volume  94 um3    79-97  13

 

 Mean Corpuscular Hemoglob  31 pg    27-31  13

 

 Mean Corpuscular HGB Cone  33 g/dL    32-36  13

 

 Redcell Distribution WDTH  16 %  High  10.5-15  13

 

 Platelet Count  356 CUMM    150-450  13

 

 Mean Platelet Volume  7.1 um3  Low  7.4-10.4  13

 

 Gran %  69.2 %    38-83  13

 

 Lymph %  19.2 %  Low  25-47  13

 

 Mononuclear %  8.0 %    1-9  13

 

 Eosinophil %  3.2 %    0-6  13

 

 Basophil %  0.4 %    0-2  13

 

 Abs Lymphs  1.7    1.0-4.8  13

 

 Abs Mononuclear  0.7    0-0.8  13

 

 Absolute Neutrophil Count  6.3    1.5-7.7  13

 

 Abs Eosinophils  0.3    0-0.6  13

 

 Abs Basophils  0    0-0.2  13, 23









 1  : [id number] JOSE DUKE

 

 2  : [id number] LAPOINT GREYSON

 

 3  *******Because ethnic data is not always readily available,



   this report includes an eGFR for both -Americans and



   non- Americans.****



   The National Kidney Disease Education Program (NKDEP) does



   not endorse the use of the MDRD equation for patients that



   are not between the ages of 18 and 70, are pregnant, have



   extremes of body size, muscle mass, or nutritional status,



   or are non- or non-.



   According to the National Kidney Foundation, irrespective of



   diagnosis, the stage of the disease is based on the level of



   kidney function:



   Stage Description                      GFR(mL/min/1.73 m(2))



   1     Kidney damage with normal or decreased GFR       90



   2     Kidney damage with mild decrease in GFR          60-89



   3     Moderate decrease in GFR                         30-59



   4     Severe decrease in GFR                           15-29



   5     Kidney failure                       &lt;15 (or dialysis)

 

 4  Anion gap measurement may be of limited value in the



   presence of any alkalosis, especially in a combined acid



   base disorder.



   .

 

 5  ** Note change in reference range as of 08.  The



   change was based on recommendations from the American



   Diabetes Association.

 

 6  Please note change in reference range effective 08



   .

 

 7  A metabolite of Naproxen, O-desmethylnaproxen, has been



   shown to interfere with the Jendrassik-Rosa method for



   measuring total bilirubin.  Samples from patients who have



   taken Naproxen have shown spurious elevation in total



   bilirubin levels.

 

 8  *******Because ethnic data is not always readily available,



   this report includes an eGFR for both -Americans and



   non- Americans.****



   The National Kidney Disease Education Program (NKDEP) does



   not endorse the use of the MDRD equation for patients that



   are not between the ages of 18 and 70, are pregnant, have



   extremes of body size, muscle mass, or nutritional status,



   or are non- or non-.



   According to the National Kidney Foundation, irrespective of



   diagnosis, the stage of the disease is based on the level of



   kidney function:



   Stage Description                      GFR(mL/min/1.73 m(2))



   1     Kidney damage with normal or decreased GFR       90



   2     Kidney damage with mild decrease in GFR          60-89



   3     Moderate decrease in GFR                         30-59



   4     Severe decrease in GFR                           15-29



   5     Kidney failure                       &lt;15 (or dialysis)

 

 9  ENTEROBACTER AEROGENES



   100^,000 ORGANISMS/ML (MANY)^CCU



   NORMAL CONCHA



   25^10-25,000 ORGANISMS/ML (MODERATE)^CCU

 

 10  NO GROWTH AFTER 5 DAYS

 

 11  NO GROWTH AFTER 5 DAYS

 

 12  ---------------------------------------------------------------------------
----



   -------------



   



   RUN DATE: 08/12/10               [facility] NMI **LIVE**



   PAGE 1



   RUN TIME: 1215                            Specimen Inquiry



   RUN USER: INTERFACE



   -----------------------------------------------------------------------------
--



   -------------



   



   Name: MONIQUE PLASCENCIA                   Acct#: [id number]      Status: DIS IN



   Re/05/10



   Age/Sex: 65/F                         Unit#: [id number]       Location: [facility]. : 44



   -----------------------------------------------------------------------------
--



   -------------



   



   



   [id number]    [facility]   Spec Date: 08/05/10        [facility] [address]: Herbie navarro MD



   Spec Type: SURGICAL P          Received:  08/05/   Copies to:



   



   



   SPECIMEN



   



   L3 TO L5 LAMINA AND SPINUS PROCESS AND LIGAMENT



   



   HISTORY



   



   PRE-OP DIAGNOSIS:    L3/L5 Lumbar stenosis.



   



   



   



   GROSS DESCRIPTION



   



   The specimen is received in formalin labelled Monique ROWE Marino, L3 to L5



   Lamina and Spinous Process and Ligament, and consists of multiple



   fragments of bone and dense fibrous tissue measuring in aggregate



   5.0 x 4.0 x 2.5 cm. Representative section, one cassette after decal.



   



   ******DIAGNOSIS******



   



   L3-5 lamina and spinous process and ligament, laminectomy:



   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



   changes.



   B)   Normocellular bone marrow with mixed trilineal hematopoiesis.



   C)   Ligament tissue with dense fibrosis and myxoid degeneration.



   



   Signed Electronically by: FLAVIO LEVY MD 08/12/10 1214



   



   -----------------------------------------------------------------------------
--



   -------------



   



   



   



   



   



   



   



   



   



   



   



   



   



   



   



   



   -----------------------------------------------------------------------------
--



   -------------



   



   DEPARTMENT OF PATHOLOGY, [address] [place] [address]



   Phone #[phone]   Fax #[phone]   [facility] Permit #[id number]



   010



   Flavio Levy M.D. Director   Ayo Mckeon M.D. Assistant Dir



   veena



   -----------------------------------------------------------------------------
--



   -------------

 

 13  

 

 14  ANY BLOOD NOT GIVEN WILL BE RELEASED AT 0700 ON THE



   ABOVE DATE UNLESS DOCTOR NOTIFIES LAB OTHERWISE.

 

 15  PREADMISSION TESTING SAMPLES FOR BLOOD BANK WILL BE HELD FOR



   14 DAYS FROM THE DATE OF COLLECTION *IF* THE FOLLOWING



   CRITERIA ARE MET:



   



   1) THE PATIENT HAS *NOT* BEEN PREGNANT IN THE LAST 3 MONTHS.



   2) THE PATIENT HAS *NOT* BEEN TRANSFUSED IN THE LAST 3



   MONTHS.



   ============================================================



   PREADMISSION TESTING SAMPLES WILL *NOT* BE HELD FOR 14 DAYS



   FROM PATIENTS WHO IN THE LAST 3 MONTHS:



   



   1) HAVE BEEN PREGNANT



   2) HAVE BEEN TRANSFUSED



   



   THESE PATIENTS *MUST* BE COLLECTED WITHIN 3 DAYS OF THE



   SURGERY DATE.

 

 16  Anion gap measurement may be of limited value in the



   presence of any alkalosis, especially in a combined acid



   base disorder.



   .

 

 17  ** Note change in reference range as of 08.  The



   change was based on recommendations from the American



   Diabetes Association.

 

 18  Please note change in reference range effective 08



   .

 

 19  *******Because ethnic data is not always readily available,



   this report includes an eGFR for both -Americans and



   non- Americans.****



   The National Kidney Disease Education Program (NKDEP) does



   not endorse the use of the MDRD equation for patients that



   are not between the ages of 18 and 70, are pregnant, have



   extremes of body size, muscle mass, or nutritional status,



   or are non- or non-.



   According to the National Kidney Foundation, irrespective of



   diagnosis, the stage of the disease is based on the level of



   kidney function:



   Stage Description                      GFR(mL/min/1.73 m(2))



   1     Kidney damage with normal or decreased GFR       90



   2     Kidney damage with mild decrease in GFR          60-89



   3     Moderate decrease in GFR                         30-59



   4     Severe decrease in GFR                           15-29



   5     Kidney failure                       &lt;15 (or dialysis)

 

 20  PLEASE NOTE NEW REFERENCE RANGE EFFECTIVE 09.

 

 21  Recommended INR for Patients



   on Oral Anticoagulants



   Prophylaxis                       2.0 - 3.0



   Treatment of thrombosis           2.0 - 3.0



   Prevention of embolism            2.0 - 3.0



   Prevention of embolism from



   prosthetic heart valves         2.5 - 3.5

 

 22  DIAGNOSIS,TREATMENT,AND THERAPY MUST BE BASED ON THE INR



   VALUE ALONE.

 

 23  Lymphopenia %



   Anemia







Procedures







 Date  CPT Code  Description  Status

 

 01/10/2017  15309  Walking Cast  Completed

 

 2016  36922  Walking Cast  Completed

 

 12/10/2016  07649  EKG, Interpretation Only  Completed

 

 2016  14834  Walking Cast  Completed

 

 2016  14437  Walking Cast  Completed

 

 11/15/2016  25672  Walking Cast  Completed

 

 2016  30132  Walking Cast  Completed

 

 10/25/2016  05367  Walking Cast  Completed

 

 10/20/2016  55029  Walking Cast  Completed

 

 10/13/2016  37810  Short Leg Cast  Completed

 

 10/06/2016  08012  Short Leg Cast  Completed

 

 2016  90224  ORIF Open TX Bimalleolar Ankle FX Includes Internal  
Completed



     Fixation  

 

 2016  93407  ORIF Open TX Bimalleolar Ankle FX Includes Internal  
Completed



     Fixation  

 

 2016  34014  EEG Recording Awake &amp; Asleep  Completed

 

 2016  18892  EEG Recording Awake &amp; Drowsy  Completed

 

 2016  40041  EKG, Interpretation Only  Completed

 

 2016  09596  ECHO Transthorasic Realtime 2D W Doppler &amp; Color  
Completed



     [facility]osp  

 

 2014  30200  EKG Tracing &amp; Interpretation  Completed

 

 11/15/2013  56341  Stress Test  Completed

 

 11/15/2013  92971  Myocardial Perfusion Imaging Tomographic (Spect)  Completed



     Multiple Studies  

 

 2013  67648  EKG Tracing &amp; Interpretation  Completed

 

 2013  48245  EKG, Interpretation Only  Completed

 

 2013  86651  ECHO Transthorasic Realtime 2D W Doppler &amp; Color  
Completed



     Flow Hosp  

 

 2010  22920  Díaz/Facet/Foraminotomy;Ea Addl Segment; Cerv, Thora, Or  
Completed



     Lumbar  

 

 2010  43786  Díaz/Facet/Foraminotomy;Vertebral Segment; Lumbar  Completed







Encounters







 Type  Date  Location  Provider  CPT E/M  Dx

 

 Office Visit  2018  [facility] hospital  Linda Rudd NP  04371  M25.572



   8:15a        

 

 Office Visit  2018  [facility]  Naren Khan M.D.  79451  
M19.172



   9:15a  [facility]      

 

 Office Visit  2017  [facility] hospital  Swathi Mallory NP  51424  M25.512



   8:45a        

 

 Office Visit  2017  Critical access hospital  Swathi Mallory NP  26902  I63.10



   8:15a        









 I48.0

 

 Z51.81

 

 Z79.01









 Office Visit  2017  8:30a  [facility] [place]  68399  
S70.312A









 S70.311A









 Office Visit  2017 10:00a  Critical access hospital  Shaka Velasquez MD  79216  
I63.40









 S82.842G

 

 R47.01

 

 Z99.3









 Office Visit  2017  8:15a  Critical access hospital  Shaka Velasquez MD  09998  I48.2









 Z79.01

 

 I10

 

 E03.9

 

 Z79.4

 

 E11.9

 

 E78.5

 

 Z99.3









 Office Visit  2017  8:00a  Critical access hospital  Linda Rudd, ANTWON  93789  R05

 

 Office Visit  10/17/2017  8:45a  [facility]  Naren Khan  37519  
S82.842D



     Of [facility]    









 M14.672









 Office Visit  10/04/2017  8:00a  Critical access hospital  Linda Rudd, ANTWON  13214  
M25.572

 

 Office Visit  2017  8:00a  Critical access hospital  Kaci Ohara M.D.  07751  
I63.10









 I48.0

 

 Z51.81

 

 Z79.01









 Office Visit  2017 10:00a  Critical access hospital  Kaci Ohara M.D.  27393  
I63.10









 I48.0

 

 Z51.81

 

 Z79.01









 Office Visit  2017 10:15a  [facility]  Naren Khan  
29096  M14.672



     [facility]    









 M25.372









 Office Visit  2017 10:30a  [facility]  Naren Khan  29142  
S82.842D



     Of [facility]    

 

 Office Visit  2017 10:00a  [facility]  Naren Khan  43327  
S82.842D



     Of [facility]    

 

 Office Visit  2017  9:30a  [facility]  Naren Khan  09572  
S82.842D



     Of [facility]STI[facility] MED[facility]    

 

 Office Visit  01/10/2017  9:30a  [facility]  Naren Khan  64192  
S82.842D



     Of [facility]    

 

 Office Visit  2016  9:15a  [facility]  Naren Khan  67973  
S82.842D



     Of [facility]    









 S82.842D









 Office Visit  12/15/2016 12:55p  [facility],  Stephanie Varela,  
27499  K92.2



     Kan MDEINA    









 I48.91

 

 E11.8

 

 I10









 [facility]  2016 12:55p  [facility]  Stephanie Varela,  
65070  K92.2



     Hospitalists  MLaishaDLaisha    









 I48.91

 

 E11.8

 

 I10









 Office Visit  2016 12:55p  [facility]  Stephanie Varela,  
42501  K92.2



     Hospitalists  MLaishaDLaisha    









 I48.91

 

 E11.8

 

 I10









 Office Visit  2016 12:54p  [facility]  Stephanie Varela,  
98828  K92.2



     Hospitalists  MLaishaDLaisha    









 I48.91

 

 E11.8









 Office Visit  2016 12:54p  [facility]  Kaci Ohara,  
05529  K92.2



     Hospitalists  MLaishaDLaisha    









 I48.91

 

 E11.8

 

 I10









 Office Visit  12/10/2016 12:53p  [facility]  Kaci Danielshn,  
20247  K92.2



     Hospitalists  MLaishaDLaisha    









 I48.91

 

 E11.8

 

 I10









 Office Visit  2016 12:53p  [facility],  Chuy Cleveland,  
81090  K92.2



     Hospitalists  NLaishaPLaisha    









 I48.91

 

 E11.8

 

 I10









 Office Visit  2016  9:15a  [facility]  Naren Khan,  30249  
S82.842D



     Of [facility]    









 S82.842D









 Office Visit  2016  2:32p  [facility]  Todd Zulay,  16244
  E11.9



     Assoc,pc  [place]    



     Hospitalists      









 Z96.7

 

 Z78.9

 

 Z87.81









 [facility]  2016  2:31p  [facility]  [address]165
  E11.9



     Assoc,pc  [place]    



     Hospitalists      









 Z96.7

 

 Z78.9

 

 Z87.81









 Office Visit  2016  2:28p  [facility]  Todd Ju[place],  51581
  E11.9



     Assoc,pc  [place]    



     Hospitalists      









 Z96.7

 

 Z78.9

 

 Z87.81









 Office Visit  2016  3:50p  [facility]  Iris Spivey,  78903  
S82.842A



     [facility]    

 

 Office Visit  2016  4:13p  [facility]  Naren Khan,  
80543  S82.852A



     [facility]    

 

 Office Visit  2016  9:15a  [facility]  Angeles Ken MD  
42833  I69.320









 I10

 

 S82.842A

 

 Z79.01









 Office Visit  2016  7:00a  [facility]  Iris Spivey,  46401  
S82.842A



     [facility]    

 

 Office Visit  2016  1:49p  [facility],  Chuy Cristofer,  
51098  R47.01



     Hospitalists  N.P.    









 I48.2

 

 N30.01

 

 I63.412









 [facility]  2016  2:43p  [facility]  [address]60  
R47.01



     Assoc, Hospitalists  M.D.    









 I48.2

 

 N30.01

 

 I63.412









 Office Visit  2016 12:44p  [facility],  Don Romero,  
62694  I63.9



     Hospitalists  M.DLaisha    









 E11.9

 

 E66.01

 

 Z79.4









 Office Visit  08/15/2016 12:43p  [facility],  Don Romero,  
04604  I63.9



     Hospitalists  MLaishaDLaisha    









 E11.9

 

 E66.01

 

 Z79.4









 Office Visit  2016 11:33a  [facility]  Francis HANNAH  99827  
I63.512



       CAROL Rosario    









 I65.29

 

 I10

 

 I48.91









 Office [facility]  2016 12:43p  [facility]  Kaci Ohara,  
03049  I63.9



     Hospitalists  M.D.    









 E11.9

 

 E66.01

 

 Z79.4









 Office Visit  2016 12:12p  [facility]  Francis HANNAH  34446  
I63.512



       CAROL Rosario    









 I48.91

 

 I65.29

 

 I10









 Office Visit  2016 12:42p  [facility],  Kaci Ohara,  
49595  I63.9



     Hospitalists  MLaishaDLaisha    









 E11.9

 

 E66.01

 

 Z79.4









 Office Visit  2016 12:42p  [facility]  Shaka Velasquez MD  
44951  I63.9



     Hospitalists      









 E11.9

 

 E66.01

 

 Z79.4









 Office Visit  2016 12:09p  [facility]  Nelia Aden,  
84978  I63.512



       M.DLaisha    









 R25.1

 

 R40.0

 

 I10

 

 I48.91









 Office Visit  2016 12:06p  [facility]  Nelia Aden,  
38350  I63.512



       M.D.    









 I10

 

 I48.91

 

 R25.1

 

 R40.0









 Office Visit  2016 12:41p  [facility],  Shaka Velasquez MD  
07980  I63.9



     Hospitalists      









 E11.9

 

 E66.01

 

 Z79.4









 Office Visit  08/10/2016  1:56p  [facility]  Nelia Aden,  
68689  I63.512



       M.D.    









 I10

 

 I48.91

 

 I65.29









 Office Visit  08/10/2016 12:41p  [facility],  Shaka Velasquez MD  
97838  I63.9



     Hospitalists      









 E11.9

 

 E66.01

 

 Z79.4









 Office Visit  2016 12:40p  [facility],  Shaka Velasquez MD  
65134  I63.9



     Hospitalists      









 E11.9

 

 E66.01

 

 Z79.4









 Office Visit  2016  9:22a  [facility]  Greg Lovepert,  
62919  I63.512



       MD    









 I10

 

 I48.91









 Office Visit  2016 12:39p  [facility],  Sedrick Lara M.D.  
75880  G45.9



     Hospitalists      









 E11.9

 

 E66.01

 

 Z79.4









 Office Visit  2014  9:30a  [facility]   Kaz Mayes  
54379  427.31



     Anthony MEDINA    

 

 Office Visit  2013  1:00p  [facility]   Kaz Mayes  
98625  427.31



     nAthony MEDINA    









 786.50

 

 V72.81

 

 794.39









 Office Visit  2013  3:51p  [facility],vito Lara M.D.  
22415  578.1



     Hospitalists      









 280.0









 Office Visit  2013  3:50p  [facility],vito Lara M.D.  
84713  578.1



     Hospitalists      









 280.0









 Office Visit  2013  3:50p  [facility]  Stephanie Varela,  
45515  578.1



     Hospitalists  M.D.    









 280.0









 Office Visit  2013  3:50p  [facility]  Stephanie Varela,  
58643  578.1



     Hospitalists  M.D.    









 280.0









 Office Visit  2013  3:36p  [facility]  Stephanie Varela,  
24300  285.1



     Hospitalists  M.D.    









 578.9

 

 411.89









 Office Visit  2013  3:36p  [facility]  Stephanie Varela,  
38624  285.1



     Hospitalists  M.D.    









 578.9

 

 411.89









 Office Visit  2013  3:36p  [facility]  Stephanie Varela,  
10518  285.1



     Hospitalists  M.D.    









 578.9

 

 411.89









 Office Visit  2013  3:35p  [facility]  Stephanie Varela,  
30954  285.1



     Hospitalists  M.D.    









 578.9

 

 411.89









 Office Visit  2013  4:07p  [facility]  Keith Mustafa M.D.,  
99766  794.31



     [facility]  FAC, FSCAI    









 411.1

 

 424.1









 Office Visit  2013  3:35p  [facility],  Stephanie Varela,  
39035  285.1



     Hospitalists  M.D.    









 578.9

 

 794.31









 Office Visit  2013  1:20p  [facility]  Herbie Marcano,  
92399  724.02



     Of Anthony  M.DLaisha    

 

 Office Visit  2012  1:20p  [facility]  Herbie Marcano  
20416  724.02



     Of Anthony  M.DLaisha    

 

 Office Visit  2011  3:00p  [facility]  Herbie Marcano  
98929  724.02



     Of Anthony  M.DLaisha    

 

 Office Visit  2011 11:00a  [facility]  Herbie Marcano  
75954  724.02



     Of Anthony  M.DLaisha    

 

 Office Visit  2011 11:40a  [facility]  Herbie Marcano,  
36048  724.02



     Of Anthony  M.D.    

 

 Office Visit  2011  9:40a  [facility]  Herbie Marcano,  
62783  724.02



     Of Anthony MEDINA    

 

 Office Visit  2010  3:40p  [facility]  Herbie Marcano,  
40498  724.02



     Of Anthony  [facility]    

 

 Office Visit  2010 10:40a  [facility]  Herbie Marcano,  
16813  724.02



     Of Anthony MEDINA    

 

 Office Visit  06/15/2010  1:40p  [facility]  Herbie Marcano,  
90966  781.2



     Of Anthony MEDINA    

 

 Office Visit  2010  9:20a  [facility]  Herbie Marcano,  
46830  781.2



     Of Anthony MEDINA    

 

 Office Visit  2010  1:40p  [facility]  Herbie Marcano,  
10518  781.2



     Of Anthony MEDINA    







Plan of Care

Future Appointment(s):2018  9:30 am - [facility]2018 - Naren Khan M.D.M14.622 Charcot's joint, 
left elbowFollow up:3 months

## 2018-05-06 LAB — INR PPP/BLD: 1.76 (ref 0.77–1.02)

## 2018-05-06 RX ADMIN — OXYCODONE HYDROCHLORIDE PRN MG: 5 CAPSULE ORAL at 21:33

## 2018-05-06 RX ADMIN — HEPARIN SODIUM SCH UNITS: 5000 INJECTION INTRAVENOUS; SUBCUTANEOUS at 05:51

## 2018-05-06 RX ADMIN — CEFAZOLIN SCH MLS/HR: 330 INJECTION, POWDER, FOR SOLUTION INTRAMUSCULAR; INTRAVENOUS at 05:43

## 2018-05-06 RX ADMIN — OXYCODONE HYDROCHLORIDE PRN MG: 5 CAPSULE ORAL at 01:58

## 2018-05-06 RX ADMIN — INSULIN GLARGINE SCH UNIT: 100 INJECTION, SOLUTION SUBCUTANEOUS at 18:17

## 2018-05-06 RX ADMIN — INSULIN LISPRO SCH UNIT: 100 INJECTION, SOLUTION INTRAVENOUS; SUBCUTANEOUS at 13:39

## 2018-05-06 RX ADMIN — OXYCODONE HYDROCHLORIDE PRN MG: 5 CAPSULE ORAL at 11:30

## 2018-05-06 RX ADMIN — CEFAZOLIN SCH MLS/HR: 330 INJECTION, POWDER, FOR SOLUTION INTRAMUSCULAR; INTRAVENOUS at 17:10

## 2018-05-06 RX ADMIN — HEPARIN SODIUM SCH UNITS: 5000 INJECTION INTRAVENOUS; SUBCUTANEOUS at 13:39

## 2018-05-06 RX ADMIN — HEPARIN SODIUM SCH UNITS: 5000 INJECTION INTRAVENOUS; SUBCUTANEOUS at 21:33

## 2018-05-06 RX ADMIN — INSULIN LISPRO SCH UNIT: 100 INJECTION, SOLUTION INTRAVENOUS; SUBCUTANEOUS at 18:17

## 2018-05-06 RX ADMIN — INSULIN LISPRO SCH: 100 INJECTION, SOLUTION INTRAVENOUS; SUBCUTANEOUS at 08:11

## 2018-05-06 RX ADMIN — AMIODARONE HYDROCHLORIDE SCH MG: 200 TABLET ORAL at 08:51

## 2018-05-06 RX ADMIN — POLYETHYLENE GLYCOL 3350 SCH GM: 17 POWDER, FOR SOLUTION ORAL at 08:51

## 2018-05-06 RX ADMIN — DOCUSATE SODIUM SCH MG: 100 CAPSULE, LIQUID FILLED ORAL at 21:33

## 2018-05-06 RX ADMIN — WATER SCH NOTE: 100 INJECTION, SOLUTION INTRAVENOUS at 21:33

## 2018-05-06 RX ADMIN — ATORVASTATIN CALCIUM SCH MG: 40 TABLET, FILM COATED ORAL at 17:10

## 2018-05-06 RX ADMIN — DOCUSATE SODIUM SCH MG: 100 CAPSULE, LIQUID FILLED ORAL at 08:52

## 2018-05-06 RX ADMIN — INSULIN LISPRO SCH: 100 INJECTION, SOLUTION INTRAVENOUS; SUBCUTANEOUS at 21:27

## 2018-05-06 RX ADMIN — LEVOTHYROXINE SODIUM SCH MCG: 125 TABLET ORAL at 05:50

## 2018-05-06 RX ADMIN — LIDOCAINE SCH PATCH: 50 PATCH CUTANEOUS at 09:46

## 2018-05-06 RX ADMIN — METOPROLOL SUCCINATE SCH MG: 25 TABLET, EXTENDED RELEASE ORAL at 09:46

## 2018-05-06 RX ADMIN — FERROUS SULFATE TAB 325 MG (65 MG ELEMENTAL FE) SCH MG: 325 (65 FE) TAB at 08:51

## 2018-05-06 RX ADMIN — FUROSEMIDE SCH MG: 40 TABLET ORAL at 08:51

## 2018-05-06 NOTE — PN
Subjective


Date of Service: 05/06/18


Interval History: 


Pt feels well. No new complaints





Objective


Active Medications: 








Acetaminophen (Tylenol Tab*)  650 mg PO Q4H PRN


   PRN Reason: FEVER/PAIN


   Last Admin: 05/06/18 08:50 Dose:  650 mg


Al Hydrox/Mg Hydrox/Simethicone (Maalox Plus*)  30 ml PO Q6H PRN


   PRN Reason: INDIGESTION


Amiodarone HCl (Cordarone Tab*)  200 mg PO DAILY [facility]


   Last Admin: 05/06/18 08:51 Dose:  200 mg


Atorvastatin Calcium (Lipitor*)  40 mg PO QPM [facility]


   Last Admin: 05/05/18 21:54 Dose:  40 mg


Baclofen (Lioresal Tab*)  5 mg PO Q8H PRN


   PRN Reason: SPASMS - MUSCLE


Dextrose (D50w Syringe 50 Ml*)  12.5 gm IV PUSH .FOR FS < 60 - SS PRN


   PRN Reason: FS < 60


Docusate Sodium (Colace Cap*)  100 mg PO BID [facility]


   Last Admin: 05/06/18 08:52 Dose:  100 mg


Ferrous Sulfate (Ferrous Sulfate Tab*)  325 mg PO DAILY [facility]


   Last Admin: 05/06/18 08:51 Dose:  325 mg


Furosemide (Lasix Tab*)  20 mg PO DAILY [facility]


   Last Admin: 05/06/18 08:51 Dose:  20 mg


Heparin Sodium (Porcine) (Heparin Vial(*))  5,000 units SUBCUT Q8HR [facility]


   Last Admin: 05/06/18 05:51 Dose:  5,000 units


Hydralazine HCl (Apresoline Iv*)  5 mg IV SLOW PU Q6H PRN


   PRN Reason: BLOOD PRESSURE


Cefazolin Sodium 1 gm/ Sodium (Chloride)  50 mls @ 200 mls/hr IVPB 0500,1700 [facility]


   Last Admin: 05/06/18 05:43 Dose:  200 mls/hr


Insulin Glargine (Lantus(*))  26 units SUBCUT Q24H [facility]


   Last Admin: 05/05/18 21:54 Dose:  26 unit


Insulin Human Lispro (Humalog*)  0 units SUBCUT ACHS [facility]


   PRN Reason: Protocol


   Last Admin: 05/06/18 08:11 Dose:  Not Given


Levothyroxine Sodium (Synthroid Tab*)  125 mcg PO 0600 [facility]


   Last Admin: 05/06/18 05:50 Dose:  125 mcg


Lidocaine (Lidoderm 5% Patch*)  1 patch TRANSDERM DAILY [facility]


   Last Admin: 05/06/18 09:46 Dose:  1 patch


Metoprolol Succinate (Toprol Xl Tab*)  25 mg PO DAILY [facility]


   Last Admin: 05/06/18 09:46 Dose:  25 mg


Oxycodone HCl (Roxycodone Tab*)  5 mg PO Q8H PRN


   PRN Reason: SEVERE PAIN


   Last Admin: 05/06/18 01:58 Dose:  5 mg


Pharmacy Profile Note (Lidocaine Patch Remove*)  1 note N/A 2100 [facility]


   Last Admin: 05/05/18 21:55 Dose:  1 note


Polyethylene Glycol/Electrolytes (Miralax*)  17 gm PO DAILY [facility]


   Last Admin: 05/06/18 08:51 Dose:  17 gm








 Vital Signs - 8 hr











  05/06/18 05/06/18 05/06/18





  04:07 05:46 07:40


 


Temperature 97.6 F  97.8 F


 


Pulse Rate 44  48


 


Respiratory 16 16 18





Rate   


 


Blood Pressure 122/44  138/67





(mmHg)   


 


O2 Sat by Pulse 98  98





Oximetry   











Oxygen Devices in Use Now: None


Appearance: 72 yo f in nAD, aAOx3, notable expressive aphasia


Eyes: No Scleral Icterus, PERRLA


Ears/Nose/Mouth/Throat: NL Teeth, Lips, Gums, Mucous Membranes Moist


Neck: NL Appearance and Movements; NL JVP, Trachea Midline


Respiratory: Symmetrical Chest Expansion and Respiratory Effort, Clear to 

Auscultation


Cardiovascular: NL Sounds; No Murmurs; No JVD, RRR


Abdominal: NL Sounds; No Tenderness; No Distention, No Hepatosplenomegaly


Lymphatic: No Cervical Adenopathy


Extremities: No Clubbing, Cyanosis, - - trace b/l pedal edema


Skin: No Nodules or Sclerosis, - - left lateral malleolus wound at 3-4 cm in 

lenght with visible metalware on bottom of wound. R lateral bottom foot-1 cm 

crater like shallow decubitus -unstageable due to slugh at bottom of wound


Neurological: Alert and Oriented x 3, - - genralized weakness b/l LE's, mixed 

aphsia, mile r facila droop-chronic


Result Diagrams: 


 05/05/18 14:39





 05/05/18 14:39


Additional Lab and Data: 


 Lab Results











  05/05/18 05/05/18 05/05/18 Range/Units





  14:39 14:39 14:39 


 


WBC  7.7    (3.5-10.8)  10^3/ul


 


RBC  3.86 L    (4.0-5.4)  10^6/ul


 


Hgb  11.8 L    (12.0-16.0)  g/dl


 


Hct  36    (35-47)  %


 


MCV  93    (80-97)  fL


 


MCH  30    (27-31)  pg


 


MCHC  33    (31-36)  g/dl


 


RDW  15    (10.5-15)  %


 


Plt Count  181    (150-450)  10^3/ul


 


MPV  8.5    (7.4-10.4)  um3


 


Neut % (Auto)  68.0    (38-83)  %


 


Lymph % (Auto)  17.5 L    (25-47)  %


 


Mono % (Auto)  7.6 H    (0-7)  %


 


Eos % (Auto)  5.9    (0-6)  %


 


Baso % (Auto)  1.0    (0-2)  %


 


Absolute Neuts (auto)  5.2    (1.5-7.7)  10^3/ul


 


Absolute Lymphs (auto)  1.3    (1.0-4.8)  10^3/ul


 


Absolute Monos (auto)  0.6    (0-0.8)  10^3/ul


 


Absolute Eos (auto)  0.5    (0-0.6)  10^3/ul


 


Absolute Basos (auto)  0.1    (0-0.2)  10^3/ul


 


Absolute Nucleated RBC  0    10^3/ul


 


Nucleated RBC %  0.1    


 


Sodium   140   (139-145)  mmol/L


 


Potassium   4.7   (3.5-5.0)  mmol/L


 


Chloride   107   (101-111)  mmol/L


 


Carbon Dioxide   26   (22-32)  mmol/L


 


Anion Gap   7   (2-11)  mmol/L


 


BUN   50 H   (6-24)  mg/dL


 


Creatinine   1.81 H   (0.51-0.95)  mg/dL


 


Est GFR ( Amer)   35.1   (>60)  


 


Est GFR (Non-Af Amer)   27.3   (>60)  


 


BUN/Creatinine Ratio   27.6 H   (8-20)  


 


Glucose   143 H   ()  mg/dL


 


Lactic Acid    0.8  (0.5-2.0)  mmol/L


 


Calcium   9.2   (8.6-10.3)  mg/dL


 


Total Bilirubin   0.30   (0.2-1.0)  mg/dL


 


AST   11 L   (13-39)  U/L


 


ALT   10   (7-52)  U/L


 


Alkaline Phosphatase   63   ()  U/L


 


C-Reactive Protein   3.75   (< 5.00)  mg/L


 


Total Protein   7.1   (6.4-8.9)  g/dL


 


Albumin   3.5   (3.2-5.2)  g/dL


 


Globulin   3.6   (2-4)  g/dL


 


Albumin/Globulin Ratio   1.0   (1-3)  














Assess/Plan/Problems-Billing


Assessment: 72 yo F with h/o cardioembolic CVA in 2016 and subsequent L ankle 

fx (s/p ORIF by Dr. Khan in 2016), DM ,obesity, PAF, ambulatory dysfucntion, 

chronic R sided weakness and expressive aphasia presents with wound noted on 

left ankle











- Patient Problems


(1) Unspecified open wound, left ankle, subsequent encounter


Comment: Pt had ORIF on left ankle in 08/2016. due to DM neuropathy her 

sensation is very diminished. Her family noted and opened wound on 5/5/18 and 

[place] [facility] [place]/o c/o pain /fevers or discharge from wound. CRP 3


will cont Ancef.


Plan for OR in AM with Dr. Khan   





(2) CKD stage 3 due to type 2 diabetes mellitus


Comment: creat at baseline   





(3) CVA (cerebral vascular accident)


Comment: - L parietal CVA with expressive/receptive aphasia in 08/2016, neuro 

deficits at baseline.


    





(4) Diabetes


Comment: Continue Lantus and ISS   





(5) Hypothyroidism


Comment: Continue home synthroid


   





(6) DVT prophylaxis


Comment: INR 1.7 today. will d/c with ortho is further tx with vit K needed.


will start SCDs

## 2018-05-06 NOTE — CONS
INPATIENT ORTHOPEDIC CONSULTATION NOTE:

 

DATE OF CONSULT:  05/06/18.

 

ATTENDING SURGEON:  Iris Spivey MD.

 

Thank you for this orthopedic consultation.

 

CHIEF COMPLAINT:  Left ankle wound.

 

HISTORY OF PRESENT ILLNESS:  Ms. Pichardo is a 73-year-old female with multiple 
medical comorbidities.  She presented to the emergency room in the evening on 05
/05/18 with a new open wound along the left lateral ankle.  The patient is seen 
by Dr. Khan over the last few years for the ankle fracture that went on to 
heal poorly due to osteoporosis.

 

The patient has been mainly in a wheelchair.  She denies having any open wounds 
along the left ankle.  Yesterday, it was noted by the patient and her daughter 
that she had a 3-cm open wound with visible hardware.  She called our answering 
service and we asked her to come into the ED.  The patient reports some dense 
neuropathy and decreased sensation in that ankle.  She has no significant pain.

 

PAST MEDICAL HISTORY:  Hypertension, diabetes, atrial fibrillation, GI bleed on 
Xarelto, small bowel AVMs, cardioembolic stroke in 2016 with mild aphasia and 
right- sided weakness. Left ankle fracture with ORIF in 2016 with Dr. Khan. 
Hyperlipidemia, morbid obesity, chronic back pain, hypothyroidism, obstructive 
sleep apnea, osteoarthritis, and lower back pain.

 

PAST SURGICAL HISTORY:  ORIF of the left ankle in 2016, Dr. Khan.  Bilateral 
knee replacements, history of lumbar laminectomy.

 

MEDICATIONS:

1.  Coumadin 2 mg p.o. daily.

2.  Lidocaine patch to the left upper back daily.

3.  Milk of mag p.r.n.

4.  Vitamin D3 of 50,000 units q. monthly.

5.  Toprol-XL 25 mg p.o. daily.

6.  Levothyroxine 125 mcg p.o. daily.

7.  Insulin lispro 4 units in the a.m.

8.  Lantus 26 units daily.

9.  Furosemide 20 mg p.o. daily.

10.  Baclofen 5 mg q.4 hours p.r.n.

11.  Lipitor 40 mg p.o. daily.

12.  Oxycodone 5 mg p.o. q.8 hours p.r.n.

13.  Insulin lispro 6 units b.i.d. with lunch and dinner.

14.  Amiodarone 200 mg p.o. daily.

 

ALLERGIES:  DILAUDID, XARELTO.  She had a GI bleed while on XARELTO.

 

FAMILY HISTORY:  Paternal MI and CVA.

 

SOCIAL HISTORY:  The patient is a resident of [facility].  She 
has a brother and sister, who are her surrogate decision makers.  She is DNR.  
She has not ambulated since the ankle fracture.  She is largely in a 
wheelchair.  No tobacco, alcohol, or recreational drug use.

 

REVIEW OF SYSTEMS:  A 14 systems reviewed with the patient.  Positive for 
chronic wound on her right foot plantar aspect, positive for the left open 
wound.  Positive for dense bilateral neuropathy.  Positive for right-sided 
weakness.  Positive for expressive aphasia, chronic low back pain, weight gain, 
constipation.  Otherwise, the patient reports review of systems is negative or 
not relevant.

 

PHYSICAL EXAM:  Temperature 97.8, heart rate 44, blood pressure 156/61.  General
: The patient is a morbidly obese female, lying in bed.  She is alert and 
oriented x3.  Pleasant mood, appropriate affect.  Some expressive aphasia is 
noted at times. Left lower extremity, she has obvious deformity at the ankle, 
which is chronic with healed incisions, laterally she has a 3 x 2 cm area with 
some necrotic fat.  There is no significant surrounding cellulitis.  She has 
dense neuropathy and cannot feel sensation of light touch around this.  There 
is minimal drainage.  When the necrotic fat is cleared away, there is a visible 
screw head.  She has a 2+ palpable DP pulse.  She does not have active 
dorsiflexion, plantar flexion at the ankle.

 

DIAGNOSTIC STUDIES/LAB DATA:  Laboratory studies from 05/05/18 show white blood 
cell 7.7, hematocrit 36, platelets 181.  INR was 2.4; today on 05/06/18 is 
1.76. Sodium 140, potassium 4.7, chloride 107, BUN and creatinine 50 and 1.81.  
CRP 3.75.

 

Radiographs:  Multiple views of the patient's left ankle are reviewed.  There 
is hardware visible from prior ankle ORIF.  There is severe deformation of this 
ankle.

 

ASSESSMENT AND PLAN:  Ms. Pichardo is a 73-year-old female with a new open wound 
on her left lateral ankle.  She has dense neuropathy.  This does not appear to 
be infected at this point.

 

The patient is a longstanding patient of Dr. Khan, so I did give him a call.  
He liked her to be n.p.o. after midnight and he will take her to the operating 
room for a formal debridement, possible closure, possible wound VAC placement 
tomorrow on 05/07/18.

 

I discussed this option with the patient and she is in favor of it.  She will 
be optimized for surgery.  Of course, Coumadin will be held and she will likely 
get a small dose of vitamin K as well.  Until then she should have p.r.n. 
analgesia.  She is currently on IV antibiotics.  She will be on bedrest.

 

 [id number]/[facility] #: [id number]

[facility]

## 2018-05-07 LAB
BASOPHILS # BLD AUTO: 0.1 10^3/UL (ref 0–0.2)
EOSINOPHIL # BLD AUTO: 0.5 10^3/UL (ref 0–0.6)
HCT VFR BLD AUTO: 33 % (ref 35–47)
HGB BLD-MCNC: 10.9 G/DL (ref 12–16)
INR PPP/BLD: 1.23 (ref 0.77–1.02)
LYMPHOCYTES # BLD AUTO: 1.5 10^3/UL (ref 1–4.8)
MCH RBC QN AUTO: 31 PG (ref 27–31)
MCHC RBC AUTO-ENTMCNC: 33 G/DL (ref 31–36)
MCV RBC AUTO: 92 FL (ref 80–97)
MONOCYTES # BLD AUTO: 0.6 10^3/UL (ref 0–0.8)
NEUTROPHILS # BLD AUTO: 4.3 10^3/UL (ref 1.5–7.7)
NRBC # BLD AUTO: 0 10^3/UL
NRBC BLD QL AUTO: 0
PLATELET # BLD AUTO: 165 10^3/UL (ref 150–450)
RBC # BLD AUTO: 3.56 10^6/UL (ref 4–5.4)
WBC # BLD AUTO: 7 10^3/UL (ref 3.5–10.8)

## 2018-05-07 PROCEDURE — 0JBR0ZZ EXCISION OF LEFT FOOT SUBCUTANEOUS TISSUE AND FASCIA, OPEN APPROACH: ICD-10-PCS | Performed by: ORTHOPAEDIC SURGERY

## 2018-05-07 PROCEDURE — 0SPG04Z REMOVAL OF INTERNAL FIXATION DEVICE FROM LEFT ANKLE JOINT, OPEN APPROACH: ICD-10-PCS | Performed by: ORTHOPAEDIC SURGERY

## 2018-05-07 RX ADMIN — AMIODARONE HYDROCHLORIDE SCH MG: 200 TABLET ORAL at 10:20

## 2018-05-07 RX ADMIN — INSULIN LISPRO SCH: 100 INJECTION, SOLUTION INTRAVENOUS; SUBCUTANEOUS at 08:29

## 2018-05-07 RX ADMIN — OXYCODONE HYDROCHLORIDE PRN MG: 5 CAPSULE ORAL at 05:35

## 2018-05-07 RX ADMIN — POLYETHYLENE GLYCOL 3350 SCH: 17 POWDER, FOR SOLUTION ORAL at 09:25

## 2018-05-07 RX ADMIN — LIDOCAINE SCH PATCH: 50 PATCH CUTANEOUS at 10:21

## 2018-05-07 RX ADMIN — METOPROLOL SUCCINATE SCH MG: 25 TABLET, EXTENDED RELEASE ORAL at 10:20

## 2018-05-07 RX ADMIN — DOCUSATE SODIUM SCH: 100 CAPSULE, LIQUID FILLED ORAL at 09:25

## 2018-05-07 RX ADMIN — CEFAZOLIN SCH MLS/HR: 330 INJECTION, POWDER, FOR SOLUTION INTRAMUSCULAR; INTRAVENOUS at 18:43

## 2018-05-07 RX ADMIN — INSULIN LISPRO SCH: 100 INJECTION, SOLUTION INTRAVENOUS; SUBCUTANEOUS at 12:11

## 2018-05-07 RX ADMIN — INSULIN LISPRO SCH: 100 INJECTION, SOLUTION INTRAVENOUS; SUBCUTANEOUS at 18:43

## 2018-05-07 RX ADMIN — WATER SCH NOTE: 100 INJECTION, SOLUTION INTRAVENOUS at 22:02

## 2018-05-07 RX ADMIN — OXYCODONE HYDROCHLORIDE PRN MG: 5 CAPSULE ORAL at 21:49

## 2018-05-07 RX ADMIN — INSULIN GLARGINE SCH UNIT: 100 INJECTION, SOLUTION SUBCUTANEOUS at 18:44

## 2018-05-07 RX ADMIN — CEFAZOLIN SCH MLS/HR: 330 INJECTION, POWDER, FOR SOLUTION INTRAMUSCULAR; INTRAVENOUS at 05:35

## 2018-05-07 RX ADMIN — INSULIN LISPRO SCH UNIT: 100 INJECTION, SOLUTION INTRAVENOUS; SUBCUTANEOUS at 22:00

## 2018-05-07 RX ADMIN — ATORVASTATIN CALCIUM SCH MG: 40 TABLET, FILM COATED ORAL at 18:37

## 2018-05-07 RX ADMIN — FERROUS SULFATE TAB 325 MG (65 MG ELEMENTAL FE) SCH: 325 (65 FE) TAB at 09:25

## 2018-05-07 RX ADMIN — DOCUSATE SODIUM SCH MG: 100 CAPSULE, LIQUID FILLED ORAL at 19:54

## 2018-05-07 RX ADMIN — LEVOTHYROXINE SODIUM SCH MCG: 125 TABLET ORAL at 05:35

## 2018-05-07 RX ADMIN — FUROSEMIDE SCH MG: 40 TABLET ORAL at 10:19

## 2018-05-07 NOTE — PN
Progress Note





- Progress Note


Date of Service: 05/07/18


SOAP: 


Subjective:


74 y/o female with dehiscence of left ankle ORIF in 08/2016.  Patient has been 

following Dr. Wren and feels very comfortably with his care, questions about 

timing of surgery and dressing care post-op.  VSS afebrile.   








Objective:


General-   Resting in bed comfortably, NAD, AO 


MSK- DResing intact over L ankle, no drainage noted. 





 Vital Signs











Temp  97.9 F   05/07/18 11:26


 


Pulse  48   05/07/18 11:26


 


Resp  16   05/07/18 11:26


 


BP  159/51   05/07/18 11:26


 


Pulse Ox  96   05/07/18 11:26








 Intake & Output











 05/06/18 05/07/18 05/07/18





 18:59 06:59 18:59


 


Intake Total 420 900 


 


Output Total  950 


 


Balance 420 -50 


 


Weight  137.438 kg 


 


Intake:   


 


  IVPB 0  


 


    ABX - CEFAZOLIN 0  


 


  Oral 420 900 


 


Output:   


 


  Urine  950 


 


Other:   


 


  Estimated Void Medium  

















Assessment:


74 y/o female with dehiscence of left ankle ORIF in 08/2016








Plan:


- OR today with Dr. Wren 


- DVT prophylaxis post-op 


- IV cefazolin 


- Currently NPO 














Acetaminophen (Tylenol Tab*)  650 mg PO Q4H PRN


   PRN Reason: FEVER/PAIN


   Last Admin: 05/06/18 08:50 Dose:  650 mg


Al Hydrox/Mg Hydrox/Simethicone (Maalox Plus*)  30 ml PO Q6H PRN


   PRN Reason: INDIGESTION


Amiodarone HCl (Cordarone Tab*)  200 mg PO DAILY [facility]


   Last Admin: 05/07/18 10:20 Dose:  200 mg


Atorvastatin Calcium (Lipitor*)  40 mg PO QPM [facility]


   Last Admin: 05/06/18 17:10 Dose:  40 mg


Baclofen (Lioresal Tab*)  5 mg PO Q8H PRN


   PRN Reason: SPASMS - MUSCLE


   Last Admin: 05/06/18 21:32 Dose:  5 mg


Dextrose (D50w Syringe 50 Ml*)  12.5 gm IV PUSH .FOR FS < 60 - SS PRN


   PRN Reason: FS < 60


Docusate Sodium (Colace Cap*)  100 mg PO BID [facility]


   Last Admin: 05/07/18 09:25 Dose:  Not Given


Ferrous Sulfate (Ferrous Sulfate Tab*)  325 mg PO DAILY [facility]


   Last Admin: 05/07/18 09:25 Dose:  Not Given


Furosemide (Lasix Tab*)  20 mg PO DAILY [facility]


   Last Admin: 05/07/18 10:19 Dose:  20 mg


Hydralazine HCl (Apresoline Iv*)  5 mg IV SLOW PU Q6H PRN


   PRN Reason: BLOOD PRESSURE


Cefazolin Sodium 1 gm/ Sodium (Chloride)  50 mls @ 200 mls/hr IVPB 0500,1700 [facility]


   Last Admin: 05/07/18 05:35 Dose:  200 mls/hr


Lactated Ringer's (Lactated Ringers 1000 Ml Bag*)  1,000 mls @ 125 mls/hr IV 

PER RATE [facility]


Insulin Glargine (Lantus(*))  16 units SUBCUT Q24H [facility]


   Last Admin: 05/06/18 18:17 Dose:  16 unit


Insulin Human Lispro (Humalog*)  0 units SUBCUT ACHS [facility]


   PRN Reason: Protocol


   Last Admin: 05/07/18 12:11 Dose:  Not Given


Levothyroxine Sodium (Synthroid Tab*)  125 mcg PO 0600 [facility]


   Last Admin: 05/07/18 05:35 Dose:  125 mcg


Lidocaine (Lidoderm 5% Patch*)  1 patch TRANSDERM DAILY [facility]


   Last Admin: 05/07/18 10:21 Dose:  1 patch


Metoprolol Succinate (Toprol Xl Tab*)  25 mg PO DAILY [facility]


   Last Admin: 05/07/18 10:20 Dose:  25 mg


Oxycodone HCl (Roxycodone Tab*)  5 mg PO Q8H PRN


   PRN Reason: SEVERE PAIN


   Last Admin: 05/07/18 05:35 Dose:  5 mg


Pharmacy Profile Note (Lidocaine Patch Remove*)  1 note N/A 2100 [facility]


   Last Admin: 05/06/18 21:33 Dose:  1 note


Polyethylene Glycol/Electrolytes (Miralax*)  17 gm PO DAILY [facility]


   Last Admin: 05/07/18 09:25 Dose:  Not Given

## 2018-05-07 NOTE — PN
Subjective


Date of Service: 05/07/18


Interval History: 





pt feels well, denies pain, SOB. NPO for surgery





Objective


Active Medications: 








Acetaminophen (Tylenol Tab*)  650 mg PO Q4H PRN


   PRN Reason: FEVER/PAIN


   Last Admin: 05/06/18 08:50 Dose:  650 mg


Al Hydrox/Mg Hydrox/Simethicone (Maalox Plus*)  30 ml PO Q6H PRN


   PRN Reason: INDIGESTION


Amiodarone HCl (Cordarone Tab*)  200 mg PO DAILY [facility]


   Last Admin: 05/06/18 08:51 Dose:  200 mg


Atorvastatin Calcium (Lipitor*)  40 mg PO QPM [facility]


   Last Admin: 05/06/18 17:10 Dose:  40 mg


Baclofen (Lioresal Tab*)  5 mg PO Q8H PRN


   PRN Reason: SPASMS - MUSCLE


   Last Admin: 05/06/18 21:32 Dose:  5 mg


Dextrose (D50w Syringe 50 Ml*)  12.5 gm IV PUSH .FOR FS < 60 - SS PRN


   PRN Reason: FS < 60


Docusate Sodium (Colace Cap*)  100 mg PO BID [facility]


   Last Admin: 05/06/18 21:33 Dose:  100 mg


Ferrous Sulfate (Ferrous Sulfate Tab*)  325 mg PO DAILY [facility]


   Last Admin: 05/06/18 08:51 Dose:  325 mg


Furosemide (Lasix Tab*)  20 mg PO DAILY [facility]


   Last Admin: 05/06/18 08:51 Dose:  20 mg


Hydralazine HCl (Apresoline Iv*)  5 mg IV SLOW PU Q6H PRN


   PRN Reason: BLOOD PRESSURE


Cefazolin Sodium 1 gm/ Sodium (Chloride)  50 mls @ 200 mls/hr IVPB 0500,1700 [facility]


   Last Admin: 05/07/18 05:35 Dose:  200 mls/hr


Lactated Ringer's (Lactated Ringers 1000 Ml Bag*)  1,000 mls @ 125 mls/hr IV 

PER RATE [facility]


Insulin Glargine (Lantus(*))  16 units SUBCUT Q24H [facility]


   Last Admin: 05/06/18 18:17 Dose:  16 unit


Insulin Human Lispro (Humalog*)  0 units SUBCUT ACHS [facility]


   PRN Reason: Protocol


   Last Admin: 05/07/18 08:29 Dose:  Not Given


Levothyroxine Sodium (Synthroid Tab*)  125 mcg PO 0600 [facility]


   Last Admin: 05/07/18 05:35 Dose:  125 mcg


Lidocaine (Lidoderm 5% Patch*)  1 patch TRANSDERM DAILY [facility]


   Last Admin: 05/06/18 09:46 Dose:  1 patch


Metoprolol Succinate (Toprol Xl Tab*)  25 mg PO DAILY [facility]


   Last Admin: 05/06/18 09:46 Dose:  25 mg


Oxycodone HCl (Roxycodone Tab*)  5 mg PO Q8H PRN


   PRN Reason: SEVERE PAIN


   Last Admin: 05/07/18 05:35 Dose:  5 mg


Pharmacy Profile Note (Lidocaine Patch Remove*)  1 note N/A 2100 [facility]


   Last Admin: 05/06/18 21:33 Dose:  1 note


Polyethylene Glycol/Electrolytes (Miralax*)  17 gm PO DAILY [facility]


   Last Admin: 05/06/18 08:51 Dose:  17 gm








 Vital Signs - 8 hr











  05/07/18 05/07/18 05/07/18





  03:47 05:35 07:43


 


Temperature 97.5 F  97.9 F


 


Pulse Rate 48  48


 


Respiratory 16 18 16





Rate   


 


Blood Pressure 150/42  148/48





(mmHg)   


 


O2 Sat by Pulse 93  96





Oximetry   











Oxygen Devices in Use Now: None


Appearance: 74 yo F in nAD, AAOx3


Eyes: No Scleral Icterus, PERRLA


Ears/Nose/Mouth/Throat: NL Teeth, Lips, Gums, Mucous Membranes Moist


Neck: NL Appearance and Movements; NL JVP, Trachea Midline


Respiratory: Symmetrical Chest Expansion and Respiratory Effort, - - faint 

bibasiliar crackles


Cardiovascular: NL Sounds; No Murmurs; No JVD, RRR


Abdominal: NL Sounds; No Tenderness; No Distention


Lymphatic: No Cervical Adenopathy


Extremities: No Clubbing, Cyanosis, - - left foot edema +1-pitting, R ankle 

trase edema


Skin: No Nodules or Sclerosis, - - R lateral foot, small 1 cm pressure ulcer-

unstegeable. L lateral malleolus open wound at 3 cm with clean cut edges, slit 

like openinf up to 1 cm in width, visible joint capsule and metalware


Neurological: Alert and Oriented x 3, - - r facial droop, b/l leg weakness-

chronic, mixed aphasia-chronic


Result Diagrams: 


 05/07/18 05:20





 05/07/18 05:20


Additional Lab and Data: 


 Lab Results











  05/05/18 05/05/18 05/05/18 Range/Units





  14:39 14:39 14:39 


 


WBC  7.7    (3.5-10.8)  10^3/ul


 


RBC  3.86 L    (4.0-5.4)  10^6/ul


 


Hgb  11.8 L    (12.0-16.0)  g/dl


 


Hct  36    (35-47)  %


 


MCV  93    (80-97)  fL


 


MCH  30    (27-31)  pg


 


MCHC  33    (31-36)  g/dl


 


RDW  15    (10.5-15)  %


 


Plt Count  181    (150-450)  10^3/ul


 


MPV  8.5    (7.4-10.4)  um3


 


Neut % (Auto)  68.0    (38-83)  %


 


Lymph % (Auto)  17.5 L    (25-47)  %


 


Mono % (Auto)  7.6 H    (0-7)  %


 


Eos % (Auto)  5.9    (0-6)  %


 


Baso % (Auto)  1.0    (0-2)  %


 


Absolute Neuts (auto)  5.2    (1.5-7.7)  10^3/ul


 


Absolute Lymphs (auto)  1.3    (1.0-4.8)  10^3/ul


 


Absolute Monos (auto)  0.6    (0-0.8)  10^3/ul


 


Absolute Eos (auto)  0.5    (0-0.6)  10^3/ul


 


Absolute Basos (auto)  0.1    (0-0.2)  10^3/ul


 


Absolute Nucleated RBC  0    10^3/ul


 


Nucleated RBC %  0.1    


 


Sodium   140   (139-145)  mmol/L


 


Potassium   4.7   (3.5-5.0)  mmol/L


 


Chloride   107   (101-111)  mmol/L


 


Carbon Dioxide   26   (22-32)  mmol/L


 


Anion Gap   7   (2-11)  mmol/L


 


BUN   50 H   (6-24)  mg/dL


 


Creatinine   1.81 H   (0.51-0.95)  mg/dL


 


Est GFR ( Amer)   35.1   (>60)  


 


Est GFR (Non-Af Amer)   27.3   (>60)  


 


BUN/Creatinine Ratio   27.6 H   (8-20)  


 


Glucose   143 H   ()  mg/dL


 


Lactic Acid    0.8  (0.5-2.0)  mmol/L


 


Calcium   9.2   (8.6-10.3)  mg/dL


 


Total Bilirubin   0.30   (0.2-1.0)  mg/dL


 


AST   11 L   (13-39)  U/L


 


ALT   10   (7-52)  U/L


 


Alkaline Phosphatase   63   ()  U/L


 


C-Reactive Protein   3.75   (< 5.00)  mg/L


 


Total Protein   7.1   (6.4-8.9)  g/dL


 


Albumin   3.5   (3.2-5.2)  g/dL


 


Globulin   3.6   (2-4)  g/dL


 


Albumin/Globulin Ratio   1.0   (1-3)  














Assess/Plan/Problems-Billing


Assessment: 74 yo F with h/o cardioembolic CVA in 2016 and subsequent L ankle 

fx (s/p ORIF by Dr. Khan in 2016), DM ,obesity, PAF, ambulatory dysfucntion, 

chronic R sided weakness and expressive aphasia presents with wound noted on 

left ankle











- Patient Problems


(1) Unspecified open wound, left ankle, subsequent encounter


Comment: Pt had ORIF on left ankle in 08/2016. due to DM neuropathy her 

sensation is very diminished. Her family noted an opened wound on 5/5/18 and 

brought pot to [facility]. N/o c/o pain /fevers or discharge from wound. CRP 3


will cont Ancef.


Plan for OR  today with Dr. Khan   





(2) CKD stage 3 due to type 2 diabetes mellitus


Comment: creat at baseline   





(3) CVA (cerebral vascular accident)


Comment: - L parietal CVA with expressive/receptive aphasia in 08/2016, neuro 

deficits at baseline.


    





(4) Diabetes


Comment: Continue Lantus and ISS   





(5) Hypothyroidism


Comment: Continue home synthroid


   





(6) Afib


Comment: - Paroxysmal afib - currently sounds regular on physical examination, 

but with h/o CVA, would prefer to resume anticoagulation with close monitoring 

of her INR as soon as possible


- Continue Amiodarone.


   





(7) DVT prophylaxis


Comment: INR 1.23 today.


cont SCDs

## 2018-05-08 VITALS — SYSTOLIC BLOOD PRESSURE: 130 MMHG | DIASTOLIC BLOOD PRESSURE: 48 MMHG

## 2018-05-08 LAB
BASOPHILS # BLD AUTO: 0 10^3/UL (ref 0–0.2)
EOSINOPHIL # BLD AUTO: 0.4 10^3/UL (ref 0–0.6)
HCT VFR BLD AUTO: 35 % (ref 35–47)
HGB BLD-MCNC: 11.7 G/DL (ref 12–16)
INR PPP/BLD: 1.02 (ref 0.77–1.02)
LYMPHOCYTES # BLD AUTO: 1 10^3/UL (ref 1–4.8)
MCH RBC QN AUTO: 31 PG (ref 27–31)
MCHC RBC AUTO-ENTMCNC: 33 G/DL (ref 31–36)
MCV RBC AUTO: 94 FL (ref 80–97)
MONOCYTES # BLD AUTO: 0.7 10^3/UL (ref 0–0.8)
NEUTROPHILS # BLD AUTO: 5.2 10^3/UL (ref 1.5–7.7)
NRBC # BLD AUTO: 0 10^3/UL
NRBC BLD QL AUTO: 0
PLATELET # BLD AUTO: 144 10^3/UL (ref 150–450)
RBC # BLD AUTO: 3.77 10^6/UL (ref 4–5.4)
WBC # BLD AUTO: 7.3 10^3/UL (ref 3.5–10.8)

## 2018-05-08 RX ADMIN — CEFAZOLIN SCH MLS/HR: 330 INJECTION, POWDER, FOR SOLUTION INTRAMUSCULAR; INTRAVENOUS at 05:38

## 2018-05-08 RX ADMIN — POLYETHYLENE GLYCOL 3350 SCH GM: 17 POWDER, FOR SOLUTION ORAL at 09:44

## 2018-05-08 RX ADMIN — LEVOTHYROXINE SODIUM SCH MCG: 125 TABLET ORAL at 05:38

## 2018-05-08 RX ADMIN — INSULIN LISPRO SCH: 100 INJECTION, SOLUTION INTRAVENOUS; SUBCUTANEOUS at 08:15

## 2018-05-08 RX ADMIN — AMIODARONE HYDROCHLORIDE SCH MG: 200 TABLET ORAL at 09:42

## 2018-05-08 RX ADMIN — FERROUS SULFATE TAB 325 MG (65 MG ELEMENTAL FE) SCH MG: 325 (65 FE) TAB at 09:41

## 2018-05-08 RX ADMIN — METOPROLOL SUCCINATE SCH MG: 25 TABLET, EXTENDED RELEASE ORAL at 09:41

## 2018-05-08 RX ADMIN — LIDOCAINE SCH PATCH: 50 PATCH CUTANEOUS at 09:47

## 2018-05-08 RX ADMIN — DOCUSATE SODIUM SCH MG: 100 CAPSULE, LIQUID FILLED ORAL at 09:41

## 2018-05-08 NOTE — DS
CC:  Dr. Dodson; Dr. Alex; Dr. Khan; [facility] *

 

DISCHARGE SUMMARY:

 

DATE OF ADMISSION:  05/05/18

 

DATE OF DISCHARGE:  05/08/18

 

PRIMARY CARE PROVIDER:  Dr. Alex from [facility].

 

DISCHARGE DIAGNOSIS:  Wound breakdown in the left lateral hindfoot with 
exposure of fibular plate, status post removal of plate, irrigation, debridement
, and wound closure, performed by Dr. Khan on 05/07/18.

 

SECONDARY DIAGNOSES:

1.  History of morbid obesity with BMI of 52.

2.  Obstructive sleep apnea.

3.  Hypertension.

4.  Diabetes.

5.  History of paroxysmal atrial fibrillation.

6.  History of gastrointestinal bleed while on Xarelto.  Patient also has 
history of small bowel arteriovenous malformations.

7.  History of cardioembolic stroke in 2016 with residual mild aphasia and right
- sided weakness.

8.  History of open reduction and internal fixation of the left ankle in 
September 2016.

9.  Hyperlipidemia.

10.  Chronic back pain.

11.  Hypothyroidism.

12.  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

13.  Laminectomy at L3-L4 levels.

 

MEDICATIONS AT DISCHARGE:  Unchanged from prior and include:

1.  Acetaminophen on p.r.n. basis.

2.  Amiodarone 200 mg daily.

3.  Vitamin C 500 mg b.i.d.

4.  Lipitor 40 mg q.p.m.

5.  Baclofen 5 mg every 8 hours p.r.n.

6.  Vitamin 50,000 units monthly.

7.  Ferrous sulfate 325 mg daily.

8.  Lasix 20 mg daily.

9.  Lantus insulin 26 units subcutaneously q.24 hours.

10.  Insulin Humalog 6 units b.i.d. with meals and 4 units q.a.m.

11.  Synthroid 125 mcg daily.

12.  Lidocaine patch, apply 1 patch to back for pain, change every 72 hours.

13.  Milk of magnesia 30 mL every 6 hours p.r.n.

14.  Toprol-XL 25 mg daily.

15.  Oxycodone 5 mg every 8 hours p.r.n..

16.  MiraLAX 17 g daily.

17.  Coumadin 2 mg daily.

 

Patient's new medication is Keflex 250 mg every 8 hours for a total of 10 days 
and further on as instructed by Dr. Khan.

 

LABORATORY DATA AND STUDIES PERFORMED DURING THE HOSPITAL STAY:  Included, on 05
/08/18, white blood cell count of 7.3, hemoglobin 11.7, hematocrit 35, and 
platelets of 144.  INR was 1.02 on 05/08/18.  Sodium was 138, potassium 4.8, 
chloride 107, carbon dioxide 23, BUN 36, creatinine 1.49. 

 

Microbiology studies showed skin and soft tissue cultures were staph aureus 
negative and MRSA negative, and showed +2 gram positive cocci in clusters 
resembling Staph and possible +1 gram positive bacilli.

 

Blood cultures were negative at the time of discharge.

 

Pathology from the hardware is pending at the time of dictation.

 

Ankle x-ray obtained on 05/05/18, impression:  "Diffuse soft tissue swelling. 
Medial dislocation of the talus relative to the tibia which has progressed from 
the previous study from April 2017.  Erosion of the medial surgical plate into 
the dome of talus similar to the prior exam.  Consider underlying infectious 
process."

 

Patient's portable chest x-ray obtained on admission, impression:  "Small right 
basilar infiltrate."

 

HOSPITALIZATION COURSE:  Monique Pichardo is a 73-year-old obese female with history 
of expressive aphasia due to previous CVA, as well as right-sided weakness and 
bilateral lower extremity weakness.  Patient lives at [facility] 
and is basically nonambulatory.  For the past several months, she has been 
having problems with the left ankle.  She got immobile and gained weight.  On 
the day of admission, patient's family noted that the patient has a slit-like 
wound noted overlying the left lateral malleolus where patient previously had 
hardware placed by Dr. Khan in 2016.  Patient was admitted to the hospital.  
Her CRP was low. Patient was not toxic appearing.  The wound had no drainage 
and the patient had been afebrile, with no leukocytosis.  Nevertheless, she was 
placed on Ancef intravenously during her hospital stay.  On Monday, on 05/07/18
, Dr. Khan saw the patient in consultation and performed removal of the 
fibular plate, with irrigation of the wound, debridement, and wound closure.  
Patient's wound was casted and patient is to be nonweightbearing on the left 
leg until further directions, as per Dr. Khan.

 

Due to MRSA negative but gram positive cocci noted on wound culture, patient is 
going to be placed on p.o. Keflex for the next 10 days that may need to be 
extended further depending on Dr. Khan's recommendation.

 

Dr. Khan wishes for the patient to be seen at his office for followup 
orthopedic evaluation next week.

 

Please note that for her paroxysmal atrial fibrillation, patient was on Coumadin
, which was reversed for her surgery.  Coumadin is going to be restarted on the 
day of discharge.  Next INR should be performed in a couple of days and 
followed as per patient's primary care provider.

 

PHYSICAL EXAMINATION AT THE TIME OF DISCHARGE:  Blood pressure of 130/47, heart 
rate of 49 and regular, respiratory rate 16, oxygen saturation 97% on room air, 
temperature of 98.4.  General:  The patient is a very pleasant 73-year-old 
female, whose BMI is 52.  Patient is in no acute distress.  Alert, awake, and 
oriented x3. HEENT:  Head is atraumatic, normocephalic.  Eyes:  Pupils are equal
, reactive to light and accommodation.  Oropharynx clear.  Mucosa moist.  Neck:
  Supple.  No JVD. No bruits bilaterally.  Cardiovascular:  Regular rate and 
rhythm.  No murmur. Respiratory:  Faint bibasilar crackles.  Abdomen:  
Protuberant, soft, nontender. Bowel sounds are present in all 4 quadrants.  
Extremities:  The left lower extremity is in a cast.  There are 2 ecchymotic 
areas on the tips of the first and second toe, likely posttraumatic.  They were 
there at admission.  Patient also has a small ecchymotic area on the right 
medial calf that was there at admission also. Patient also has a small area of 
ulceration on the right lateral bottom of her foot, approximately 1 cm in 
diameter, crater like, with bottom covered with slough and unstageable.

 

Please note that patient was noted to have possibility of small basilar 
infiltrate on her chest x-ray.  She had no symptoms of infection and I suspect 
it is atelectasis.  That was not treated.

 

Patient is nonweightbearing on the left leg.

 

Patient is recommended to have DuoDERM wound care to the right bottom foot 
ulcer with changes on a daily basis.

 

Please note that this is a short summary of the patient's hospitalization.  
Please refer to further medical records for details.

 

TIME SPENT:  Approximately 45 minutes were spent on patient's discharge.

 

 [id number]/[facility] #: [id number]

MTDD

## 2018-05-08 NOTE — OP
DATE OF OPERATION:  05/07/18 - ROOM #338

 

DATE OF BIRTH:  [birth year]

 

SURGEON:  Naren Khan MD

 

ASSISTANT:  Betty Matias PA-C

 

PRE-OP DIAGNOSIS:  Wound breakdown, left lateral hindfoot with exposure of 
fibular plate.

 

POST-OP DIAGNOSIS:  Wound breakdown, left lateral hindfoot with exposure of 
fibular plate.

 

OPERATIVE PROCEDURE:  Removal of plate; irrigation, debridement and wound 
closure.

 

DESCRIPTION OF PROCEDURE:  The patient was taken to the operating room where 
local anesthetic was instilled around the previous fibular incision.  We opened 
up longitudinally over the fibula plate and the plate was removed with use of 
the small fragment screwdriver.  We scraped the bed of the plate with a large 
curette. We used sharp dissection distally to ellipticize ______ the distal 
wound leaving fresh skin edges and fresh subcutaneous tissue.  Cultures were 
sent.  There was no purulence noted.  We used a pulsatile lavage to thoroughly 
clean the bed.  There was fresh bleeding throughout.  Closure of 2-0 Vicryl and 
Prolene for the skin was then performed as well as a plaster splint.

 

 [id number]/[facility] #: [id number]

MTDD

## 2018-07-04 ENCOUNTER — HOSPITAL ENCOUNTER (EMERGENCY)
Dept: HOSPITAL 25 - ED | Age: 73
Discharge: HOME | End: 2018-07-04
Payer: MEDICARE

## 2018-07-04 VITALS — DIASTOLIC BLOOD PRESSURE: 66 MMHG | SYSTOLIC BLOOD PRESSURE: 149 MMHG

## 2018-07-04 DIAGNOSIS — M25.572: Primary | ICD-10-CM

## 2018-07-04 DIAGNOSIS — I69.320: ICD-10-CM

## 2018-07-04 DIAGNOSIS — E11.40: ICD-10-CM

## 2018-07-04 DIAGNOSIS — Z88.8: ICD-10-CM

## 2018-07-04 DIAGNOSIS — I10: ICD-10-CM

## 2018-07-04 DIAGNOSIS — S82.892D: ICD-10-CM

## 2018-07-04 DIAGNOSIS — Z87.891: ICD-10-CM

## 2018-07-04 DIAGNOSIS — I25.10: ICD-10-CM

## 2018-07-04 DIAGNOSIS — I69.354: ICD-10-CM
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
## 2018-07-04 LAB
BASOPHILS # BLD AUTO: 0.1 10^3/UL (ref 0–0.2)
EOSINOPHIL # BLD AUTO: 0.6 10^3/UL (ref 0–0.6)
HCT VFR BLD AUTO: 37 % (ref 35–47)
HGB BLD-MCNC: 12.2 G/DL (ref 12–16)
INR PPP/BLD: 1.83 (ref 0.77–1.02)
LYMPHOCYTES # BLD AUTO: 1.6 10^3/UL (ref 1–4.8)
MCH RBC QN AUTO: 31 PG (ref 27–31)
MCHC RBC AUTO-ENTMCNC: 33 G/DL (ref 31–36)
MCV RBC AUTO: 91 FL (ref 80–97)
MONOCYTES # BLD AUTO: 0.7 10^3/UL (ref 0–0.8)
NEUTROPHILS # BLD AUTO: 5.7 10^3/UL (ref 1.5–7.7)
NRBC # BLD AUTO: 0 10^3/UL
NRBC BLD QL AUTO: 0
PLATELET # BLD AUTO: 190 10^3/UL (ref 150–450)
RBC # BLD AUTO: 3.99 10^6/UL (ref 4–5.4)
WBC # BLD AUTO: 8.6 10^3/UL (ref 3.5–10.8)

## 2018-07-04 PROCEDURE — 85025 COMPLETE CBC W/AUTO DIFF WBC: CPT

## 2018-07-04 PROCEDURE — 85610 PROTHROMBIN TIME: CPT

## 2018-07-04 PROCEDURE — 86140 C-REACTIVE PROTEIN: CPT

## 2018-07-04 PROCEDURE — 80053 COMPREHEN METABOLIC PANEL: CPT

## 2018-07-04 PROCEDURE — 83605 ASSAY OF LACTIC ACID: CPT

## 2018-07-04 PROCEDURE — 99283 EMERGENCY DEPT VISIT LOW MDM: CPT

## 2018-07-04 PROCEDURE — 36415 COLL VENOUS BLD VENIPUNCTURE: CPT

## 2018-07-04 PROCEDURE — 85652 RBC SED RATE AUTOMATED: CPT

## 2018-07-04 NOTE — XMS REPORT
Monique Plascencia

 Created on:2018



Patient:Monique Plascencia

Sex:Female

:1945

External Reference #:[id number]





Demographics







 Address  [address] [place] [address]

 

 Home Phone  [phone]

 

 Mobile Phone  [phone]

 

 Email Address  [email or web address]

 

 Preferred Language  English

 

 Marital Status  Not  Or 

 

 Judaism Affiliation  Unknown

 

 Race  White

 

 Ethnic Group  Not  Or 









Author







 Organization  [facility]

 

 Address  [address] [address]

 

 Phone  [phone]









Support







 Name  Relationship  Address  Phone

 

 Agustina Xavier  Unavailable  Unavailable  [phone]

 

 Elva Plascencia  Unavailable  Unavailable  [phone]

 

 Matt Salmeron  Unavailable  Unavailable  [phone]

 

 Whitney Sparks  Unavailable  Unavailable  [phone]

 

 Jesi Larsen  Unavailable  Unavailable  [phone]









Care Team Providers







 Name  Role  Phone

 

 Patient's Choice  Primary Care Physician  Unavailable









Payers







 Type  Date  Identification Numbers  Payment Provider  Subscriber

 

 Medicare Primary  Effective:  Policy Number:  Medicare  Monique Plascencia



   1995  [id number]    









 PayID: [id number]  [address]









 [place] [address]









 Medigap Part B  Effective: 2013  Policy Number:  BS Facets  Monique Plascencia



     [id number]    









 Expires: 2017  PayID: [id number]  [address] [address]









 [place] [address]









 Medigap Part B  Effective: 2017  Policy Number: [id number]  Medicaid  
Monique Plascencia









 Expires: 2017  Group Name: 1 1  [address]









 PayID: [id number]  [place] [address]









 Commercial    Policy Number: [id number]  [place]  Monique Plascencia









 PayID: [id number]  [address]









 [place] [address]







Problems







 Date  Description  Provider  Status

 

 Onset: 2013  Atrial fibrillation  Kaz Mayes M.D.  Active

 

 Onset: 2013  Chest pain  Kaz Mayes M.D.  Active

 

 Onset: 2013  Preoperative cardiovascular  Kaz Mayes M.D.  Active



   examination    

 

 Onset: 2013  Abnormal results of cardiovascular  Kaz Mayes M.D.  
Active



   function studies    

 

 Onset: 2018  Arthropathy associated with a  Naren Khan M.D.  Active



   neurological disorder    

 

 Onset: 2018  Teo hematuria  Swathi Mallory NP  Active

 

 Onset: 2018  Urinary tract infectious disease  Swathi Mallory NP  
Active

 

 Onset: 2018  Arthralgia of the ankle and/or  Allison Alex MD  
Active



   foot    

 

 Onset: 2018  Paroxysmal atrial fibrillation  Allison Alex MD  
Active

 

 Onset: 2018  Shoulder joint pain  Allison Alex MD  Active

 

 Onset: 2018  Aphasia  Allison Alex MD  Active

 

 Onset: 2018  Essential hypertension  Allison Alex MD  Active

 

 Onset: 2018  Type 2 diabetes mellitus  Allison Alex MD  Active

 

 Onset: 2018  Localized, secondary  Naren Khan M.D.  Active



   osteoarthritis of the ankle and/or    



   foot    

 

 Onset: 2018  Cerebral infarction due to  Swathi Mallory NP  Active



   embolism of precerebral arteries    

 

 Onset: 2018  Medication monitoring  Swathi Mallory NP  Active

 

 Onset: 2018  Long-term current use of  Swathi Mallory NP  Active



   anticoagulant    

 

 Onset: 2018  Abrasion and/or friction burn of  Linda Rudd, NP  
Active



   lower limb without infection    

 

 Onset: 2018  Abrasion and/or friction burn of  Linda Blaire, NP  
Active



   lower limb without infection    

 

 Onset: 2018  Cerebral infarction due to  Shaka Velasquez MD  Active



   embolism of cerebral arteries    

 

 Onset: 2018  Displ bimalleol fx l low leg, subs  Shaka Velasquez MD  
Active



   for clos fx w delay heal    

 

 Onset: 2018  Dependence on wheelchair  Shaka Velasquez MD  Active

 

 Onset: 2018  Chronic atrial fibrillation  Shaka Velasquez MD  Active

 

 Onset: 2018  Hypothyroidism  Shaka Velasquez MD  Active

 

 Onset: 2018  Long-term current use of insulin  Shaka Velasquez MD  Active

 

 Onset: 2018  Hyperlipidemia  Shaka Velasquez MD  Active

 

 Onset: 2018  Cough  Linda Rudd NP  Active

 

 Onset: 2018  Displ bimalleol fx l low leg, subs  Naren Khan M.D.  
Active



   for clos fx w routn heal    

 

 Onset: 2018  Charcot's joint of foot  Naren Khan M.D.  Active

 

 Onset: 2018  Joint derangement  Naren Khan M.D.  Active

 

 Onset: 2018  Gastrointestinal hemorrhage  Stephanie Varela M.D.  Active

 

 Onset: 2018  Diabetes mellitus  Stephanie Varela M.D.  Active

 

 Onset: 2018  Electrocardiogram abnormal  Ty Chip Jean M.D.,  Active



     FACC, FASNC  

 

 Onset: 2018  Pressure ulcer of unspecified  Betty Waltham, RPA-C  Active



   heel, stage 3    

 

 Onset: 2018  Implantation of joint prosthesis  JAILENE Sky
  Active

 

 Onset: 2018  Other specified health status  JAILENE Sky  
Active

 

 Onset: 2018  H/O: fracture  JAILENE Sky  Active

 

 Onset: 2018  Encounter for other orthopedic  Betty Polly, RPA-C  Active



   aftercare    

 

 Onset: 2018  Closed bimalleolar fracture  Betty Londonjoy, RPA-C  Active

 

 Onset: 2018  Closed trimalleolar fracture  Naren Khan M.D.  Active

 

 Onset: 2018  Aphasia as late effect of  Angeles Ken MD  Active



   cerebrovascular accident    

 

 Onset: 2018  Acute hemorrhagic cystitis  Chuy Cleveland, N.P.  Active

 

 Onset: 2018  Cerebral infarction due to  Chuy Cleveland, N.P.  Active



   embolism of middle cerebral artery    

 

 Onset: 2018  Cerebral artery occlusion  Don Romero M.D.  Active

 

 Onset: 2018  Morbid obesity  Don Romero M.D.  Active

 

 Onset: 2018  Occlusion and stenosis of middle  Francis Rosario M.D.  
Active



   cerebral artery with infarction    

 

 Onset: 2018  Carotid artery occlusion  Francis Rosario M.D.  Active

 

 Onset: 2018  Abnormal involuntary movement  Nelia Aden M.D.  Active

 

 Onset: 2018  Disturbance of consciousness  Nelia Aden M.D.  Active

 

 Onset: 2018  Athscl heart disease of native  Keith Mustafa M.D., [facility],  
Active



   coronary artery w/o ang pctrs  [facility]  

 

 Onset: 2018  Transient cerebral ischemia  Sedrick Lara M.D.  Active

 

 Onset: 2018  Electrocardiogram abnormal  Kaz Mayes M.D.  Active

 

 Onset: 2018  Pre-surgery evaluation  Kaz Mayes M.D.  Active

 

 Onset: 2018  Melena  Sedrick Lara M.D.  Active

 

 Onset: 2018  Anemia due to chronic blood loss  Sedrick Lara M.D.  Active

 

 Onset: 2018  Acute posthemorrhagic anemia  Stephanie Varela M.D.  Active

 

 Onset: 2018  Gastrointestinal hemorrhage  Stephanie Varela M.D.  Active

 

 Onset: 2018  Ischemic heart disease  Stephanie Varela M.D.  Active

 

 Onset: 2018  Impending infarction  Keith Mustafa M.D., [facility],  Active



     [facility]  

 

 Onset: 2018  Aortic valve disorder  Keith Mustafa M.D., [facility],  Active



     [facility]  

 

 Onset: 2018  Edema  Ty Chip Jean M.D.,  Active



     [facility], [facility]  

 

 Onset: 2018  Spinal stenosis of lumbar region  Herbie Marcano M.D.  
Active

 

 Onset: 2018  Postsurgical Status Other  Herbie Marcano M.D.  Active

 

 Onset: 2018  Postsurgical Status Other  Herbie Marcano M.D.  Active

 

 Onset: 2018  Abnormal gait  Herbie Marcano M.D.  Active







Family History







 Date  Family Member(s)  Problem(s)  Comments

 

   General  Lung Cancer  father

 

   General  Arthritis  bother, sister

 

   General  Pulmonary Embolism (Pe)  father







Social History







 Type  Date  Description  Comments

 

 Lives With    Assisted living  

 

 Occupation    Retired  

 

 ETOH Use    Denies alcohol use  

 

 Smoking    Patient is a former smoker  pack and half a day, quit in



       

 

 Recreational Drug Use    Denies Drug Use  

 

 Exercise Type/Frequency    Does not exercise  







Allergies, Adverse Reactions, Alerts







 Date  Description  Reaction  Status  Severity  Comments

 

 2013  Dilaudid    active    

 

 2018  Hydromorphone Hydrochloride    active    

 

 2018  Rivaroxaban    active    







Medications







 Medication  Date  Status  Form  Strength  Qnty  SIG  Indications  Ordering



                 Provider

 

 Warfarin Sodium    Active  Tablets  3mg  90tab  1 tab every  I48.91  
        s  night or as    Touchmisha,



             marcus KAPOOR

 

 Levothyroxine    Active  Tablets  125mcg  30tab  1 by mouth  E03.9  Swathi



 Sodium          s  every day    ANTWON Mallory

 

 Oxycodone HCL    Active  Tablets  5mg  30tab  1 tab PO Q  R52          s  8 hrs prn    ANTWON Mallory

 

 Acetaminophen    Active  Tablets  500mg    2 tabs PO              bid    ANTWON Mallory

 

 Ferrous Sulfate    Active  Tablets  325(65Fe)  90tab  1 by mouth          mg  s  every day    ANTWON Mallory

 

 Aspercreme    Active  Patches  4%  90uni  apply qam L    Swathi



 Lidocaine  /2018        ts  shoulder,    Touchton,



             remove Q PM    NP

 

 Miralax    Active  Powder  3350NF  1unit  17 gm    Herbie J.



           s  mixed w/ 8    [place]ack,



             oz    M.D.



             water/juice    



             On even    



             days, hold    



             for loose    



             stool.    

 

 Furosemide    Active  Tablets  20mg  90tab  1 po qd    Unknown



   /0000        s      

 

 Lantus    Active  Solution  100Unit/M  6Vial  26 units SQ  E11.9  West,



   /0000      L  s  Q hs    Maryam,



                 DO

 

 Atorvastatin    Active  Tablets  40mg  90tab  take 1  E78.5  West,



 Calcium  /0000        s  tablet at    Maryam,



             bedtime on    DO



             Odd days    

 

 Metoprolol    Active  Tablets ER  25mg  90tab  1 by mouth  I10  West,



 Succinate ER  /0000    24HR    s  every day    Maryam,



                 DO

 

 Amiodarone HCL    Active  Tablets  200mg    1 by mouth    West,



   /0000          every day    Maryam,



                 DO

 

 Humalog    Active  Solution  100Unit/M    4u subq    West,



   /0000    Cartridge  L    with    Maryam,



             breakfast,    DO



             6u subq    



             with lunch    



             and dinner    

 

 Milk Of Magnesia    Active  Suspension  400mg/5ML    30    West,



   /0000          milliliters    Maryam,



             by mouth Q    DO



             6hrs as    



             needed for    



             constipatio    



             n    

 

 Tylenol Extra    Active  Tablets  500mg    2 by mouth    West,



 Strength  /0000          Q 24 hrs as    Maryam,



             needed    DO

 

 Ascorbic Acid    Active  Tablets  500mg  30tab  1 atb PO    West,



           s  bid    Maryam,



                 DO

 

 Vitamin D3 Ultra    Active  Tablets  63760Qtxa    one by  E55.9  West,



 Potency  /          mouth Q    Maryam,



             Month    DO

 

 Baclofen    Active    5mg    q 8hrs prn  G89.29  West,



                 Maryam,



                 DO

 

 Calazime Skin    Active  Paste      apply to    West,



 Protectant/Carballo  /          buttocks Q    Maryam,



 mine            shift    DO

 

 Keflex    Active  Capsules  250mg    q 8 hours x    Bill          10 days    MD Naren

 

 Cholecalciferol    Active  Powder      1000 unit    Unknown



             tablet by    



             mouth every    



             day    

 

 Polyethylene    Active  Packet  3350NF        Unknown



 Glycol 335              

 

 Vitamin C    Active  Capsules  500mg    1 by mouth    Unknown



             every day    

 

                 

 

 Methocarbamol    Hx  Tablets  500mg  45tab  one to two    Herbie KAMINSKI



   /        s  up to qisena Marcano



   -          prn for    M.DLaisha



   10/30          spasm    



   /2013              

 

 Hydrocodone/Acet  10/19  Hx  Tablets  5-325mg  90tab  1-2 po qid    Herbie KAMINSKI



 aminophen  /        s  prn    Brittney Marcano M.D.



   10/31              



   /2013              

 

 Oxycodone HCL    Hx  Tablets  5mg  90tab  1-2 po qid    Herbie KAMINSKI



   /        s  prn    Brittney Marcano M.D.



   10/19              



   /2010              

 

 Valium    Hx  Tablets  5mg  3tabs  1 po 2    Herbie KAMINSKI



   /          hours prior    Jeet,



   -          to mri may    M.DLaisha



   10/31          take one    



             more q 1 hr    



             prn anxiety    

 

 Levothyroxine    Hx  Tablets  125mcg  90tab  1 po qd    Unknown



 Sodium  /0000        s      



   -              



   10/07              



   /2017              

 

 Omeprazole    Hx  Capsules DR  20mg  90cap  1 po qd    Unknown



   /        s      



   -              



   10/07              



   /2017              

 

 Multivitamins    Hx  Capsules    30cap  1 capsule    Unknown



   /        s  dawna;y    



   -              



   10/07              



   /2017              

 

 Oxycodone HCL    Hx        up to 6    Unknown



   /0000          tabs po qd    



   -          prn    



                 

 

 Gemfibrozil    Hx  Tablets  600mg  180ta  1 po bid    Unknown



   /0000        bs      



   -              



                 

 

 Gabapentin    Hx  Capsules  100mg  240ca  2 po bid    Unknown



   /0000        ps      



   -              



   10/07              



   /2017              

 

 B-12  00  Hx    1200mg    1 po qd    Unknown



   /0000              



   -              



   10/07              



   /2017              

 

 Glipizide    Hx  Tablets  5mg  60tab  1 po bid    Unknown



   /0000        s      



   -              



                 

 

 Citalopram    Hx  Tablets  20mg  30tab  1 po qd    Unknown



 Hydrobromide  /0000        s      



   -              



                 

 

 Aspirin Ec    Hx  Tablets DR  81mg  100ta  1 by mouth    Unknown



   /0000        bs  every day    



   -              



   10/07              



   /2017              

 

 Glucosamine    Hx  Capsules  1500Com    bid    Unknown



 Chondroitin 1500  /0000              



 Complex Maximum  -              



 Strength  10/01              



   /2017              

 

 Celexa    Hx  Tablets  10mg    1 by mouth    Unknown



   /0000          every day    



   -              



   10/01              



   /2017              

 

 Docusate Sodium    Hx  Capsules  100mg    1 by mouth    Unknown



   /0000          twice a day    



   -              



   10/07              



   /2017              

 

 Humalog Mix    Hx  Suspension  (75-25)10    8 units sq    Unknown



 75/25  /0000      0Unit/ML    twice a day    



   -          with    



   10/17          breakfast    



             and dinner    

 

 Cipro    Hx  Tablets  500mg    1 by mouth    Unknown



   /0000          twice a day    



   -              



   10/07              



   /2017              

 

 Albuterol    Hx  Nebulizer  (2.5mg/3M    1 vial via    Unknown



 Sulfate  /0000      L) 0.083%    nebulizer 4    



   -          times daily    



   10/07          as needed    



   /2017              

 

 Warfarin Sodium    Hx  Tablets  2.5mg        Unknown



   /0000              



   -              



                 

 

 Skin Prep Wipes    Hx            Unknown



   /0000              



   -              



                 

 

 Miconazole    Hx  Cream  2%    apply twice    Unknown



 Nitrate  /0000          a day until    



   -          clear    



                 

 

 Hydrocortisone    Hx  Ointment  1%        Unknown



   /0000              



   -              



                 







Medications Administered in Office







 Medication  Date  Status  Form  Strength  Qnty  SIG  Indications  Ordering



                 Provider

 

 Inj,  11/15/2  Administered  Injection          Kaz MENDIOLA



 Regadenoson,  Orlando Mayes M.D.



 0.1 MG                

 

 Technetium TC  11/15/2  Administered  Injection          Kaz MENDIOLA



 99M  013              CAROL Mayes



 Tetrofosmin,                



 Per Unit Dose                



 Up To 40                



 Millicuries                







Vital Signs







 Date  Vital  Result  Comment

 

 2018  Height  65 inches  5'5"









 Weight  303.00 lb  

 

 Heart Rate  68 /min  

 

 Respiratory Rate  15 /min  

 

 Body Temperature  97.5 F  

 

 Pain Level  7  

 

 BMI (Body Mass Index)  50.4 kg/m2  









 2018  Height  65 inches  5'5"









 Weight  303.00 lb  

 

 Heart Rate  80 /min  

 

 BP Systolic Sitting  126 mmHg  

 

 BP Diastolic Sitting  60 mmHg  

 

 Respiratory Rate  16 /min  

 

 Body Temperature  97.0 F  

 

 Pain Level  7  

 

 BMI (Body Mass Index)  50.4 kg/m2  









 2018  Height  65 inches  5'5"









 Heart Rate  66 /min  

 

 Respiratory Rate  20 /min  

 

 Body Temperature  96.1 F  

 

 Pain Level  0  









 2018  Heart Rate  49 /min  









 BP Systolic Sitting  134 mmHg  

 

 BP Diastolic Sitting  64 mmHg  

 

 Body Temperature  96.5 F  









 10/17/2017  Height  65 inches  5'5"









 Weight  229.00 lb  

 

 BP Systolic  115 mmHg  

 

 BP Diastolic  81 mmHg  

 

 Respiratory Rate  16 /min  

 

 Body Temperature  98.2 F  

 

 BMI (Body Mass Index)  38.1 kg/m2  









 2017  Height  65 inches  5'5"









 Weight  266.00 lb  

 

 BP Systolic  115 mmHg  

 

 BP Diastolic  83 mmHg  

 

 Body Temperature  97.7 F  

 

 Pain Level  5  

 

 BMI (Body Mass Index)  44.3 kg/m2  









 2017  Height  65 inches  5'5"









 Weight  266.00 lb  

 

 Heart Rate  72 /min  

 

 BP Systolic  130 mmHg  

 

 BP Diastolic  82 mmHg  

 

 Respiratory Rate  20 /min  

 

 Body Temperature  96.0 F  

 

 Pain Level  0  

 

 BMI (Body Mass Index)  44.3 kg/m2  









 2017  Height  65 inches  5'5"









 Weight  266.00 lb  

 

 Heart Rate  68 /min  

 

 Respiratory Rate  16 /min  

 

 Body Temperature  97.6 F  

 

 Pain Level  4  

 

 BMI (Body Mass Index)  44.3 kg/m2  









 2017  Height  65 inches  5'5"









 Weight  266.00 lb  

 

 Heart Rate  60 /min  

 

 BP Systolic  130 mmHg  

 

 BP Diastolic  68 mmHg  

 

 Respiratory Rate  16 /min  

 

 Pain Level  1  

 

 BMI (Body Mass Index)  44.3 kg/m2  









 01/10/2017  Height  65 inches  5'5"









 Weight  266.00 lb  

 

 Pain Level  0  

 

 BMI (Body Mass Index)  44.3 kg/m2  









 2016  Height  65 inches  5'5"









 Weight  266.00 lb  

 

 Respiratory Rate  18 /min  

 

 Pain Level  0  

 

 BMI (Body Mass Index)  44.3 kg/m2  









 2016  Respiratory Rate  17 /min  









 Body Temperature  98.8 F  

 

 Pain Level  0  









 11/15/2016  Height  65 inches  5'5"









 Weight  266.00 lb  

 

 Heart Rate  60 /min  

 

 Respiratory Rate  16 /min  

 

 Pain Level  0  

 

 BMI (Body Mass Index)  44.3 kg/m2  









 2016  Height  65 inches  5'5"









 Weight  266.00 lb  

 

 Pain Level  0  

 

 BMI (Body Mass Index)  44.3 kg/m2  









 10/25/2016  Height  65 inches  5'5"









 Weight  266.00 lb  

 

 Heart Rate  60 /min  

 

 Respiratory Rate  16 /min  

 

 Pain Level  0  

 

 BMI (Body Mass Index)  44.3 kg/m2  









 10/20/2016  Height  65 inches  5'5"









 Weight  266.00 lb  

 

 Pain Level  0  

 

 BMI (Body Mass Index)  44.3 kg/m2  









 10/13/2016  Height  65 inches  5'5"









 Weight  266.00 lb  

 

 Body Temperature  95.8 F  

 

 BMI (Body Mass Index)  44.3 kg/m2  









 10/06/2016  Height  65 inches  5'5"









 Weight  266.00 lb  

 

 Pain Level  2  

 

 BMI (Body Mass Index)  44.3 kg/m2  









 2016  Height  65 inches  5'5"









 Weight  266.00 lb  

 

 Heart Rate  56 /min  

 

 BP Systolic  120 mmHg  

 

 BP Diastolic  59 mmHg  

 

 BMI (Body Mass Index)  44.3 kg/m2  









 2014  Height  63 inches  5'3"









 Weight  280.00 lb  with shoes

 

 Heart Rate  64 /min  regular

 

 BP Systolic Sitting  102 mmHg  right arm large cuff

 

 BP Diastolic Sitting  58 mmHg  right arm large cuff

 

 BP Systolic Standing  100 mmHg  right arm large cuff

 

 BP Diastolic Standing  54 mmHg  right arm large cuff

 

 Respiratory Rate  18 /min  

 

 BMI (Body Mass Index)  49.6 kg/m2  









 2013  Height  63 inches  5'3"









 Weight  264.00 lb  

 

 Heart Rate  48 /min  

 

 BP Systolic  104 mmHg  Ra large cuff

 

 BP Diastolic  54 mmHg  Ra large cuff

 

 BP Systolic Sitting  104 mmHg  LA large cuff

 

 BP Diastolic Sitting  56 mmHg  LA large cuff

 

 BP Systolic Standing  100 mmHg  LA

 

 BP Diastolic Standing  54 mmHg  LA

 

 Respiratory Rate  16 /min  

 

 BMI (Body Mass Index)  46.8 kg/m2  







Results







 Test  Date  Test  Result  H/L  Range  Note

 

 Laboratory test  2016  Point of Care Glucose  209 mg/dL  High    1



 finding            

 

 Laboratory test  2016  Point of Care Glucose  182 mg/dL  High    2



 finding            

 

 Creatinine  2012  Creatinine  2.0 mg/dL  High  0.50-1.40  









 One Over Creatinine  0.50      

 

 eGFR Non-  24.9    > 60  

 

 eGFR   32.0    > 60  3









 Comp Metabolic Panel  2010  Sodium  137 mmol/L    135-145  









 Potassium  4.6 mmol/L    3.5-5.0  

 

 Chloride  106 mmol/L    101-111  

 

 Co2 (Carbon Dioxide)  22.0 mmol/L    22-32  

 

 Anion Gap  9.0 mmol/L    2-11  4

 

 Glucose  208 mg/dL  High    5

 

 BUN  32 mg/dL  High  6-24  

 

 Creatinine  1.70 mg/dL  High  0.50-1.40  

 

 One Over Creatinine  0.50      

 

 BUN/Creatinine Ratio  18.8    8-20  

 

 Calcium  8.8 mg/dL    8.1-9.9  6

 

 Total Protein  6.5 GM/DL    6.2-8.1  

 

 Albumin  3.2 GM/DL    3.2-5.2  

 

 Globulin  3.3 GM/DL    2-4  

 

 Albumin/Globulin Ratio  1.0    1-3  

 

 Bilirubin Total  0.5 mg/dL    0.4-1.5  7

 

 Alkaline Phosphatase  70 U/L      

 

 Alt (SGPT)  9 U/L  Low  14-54  

 

 Ast (Sgot)  33 U/L    12-42  

 

 eGFR Non-  32.0    > 60  

 

 eGFR   38.7    > 60  8









 CBC With Electronic Diff  2010  White Blood Count  10.6 CUMM    4.8-10.8
  









 Red Cell Count  2.75 CUMM  Low  4.2-5.4  

 

 Hemoglobin  8.6 g/dL  Low  12.0-16.0  

 

 Hematocrit  25 %  Low  35-47  

 

 Mean Corpuscular Volume  92 um3    79-97  

 

 Mean Corpuscular Hemoglob  31 pg    27-31  

 

 Mean Corpuscular HGB Cone  34 g/dL    32-36  

 

 Redcell Distribution WDTH  15 %    10.5-15  

 

 Platelet Count  252 CUMM    150-450  

 

 Mean Platelet Volume  7.3 um3  Low  7.4-10.4  









 Manual Differential  2010  Polysegmented Neutrophil  78 %    38-83  









 Lymphocyte  17 %  Low  25-47  

 

 Monocyte  4 %    0-13  

 

 Eosinophil  1 %    0-6  

 

 Absolute Neutrophil Count  8.2      

 

 RBC Morphology  NORMAL      









 Urinalysis W/Microscopic  2010  Ua Color  YELLOW    Yellow  









 Appearance-Urine  CLEAR    Clear  

 

 Specific Gravity-Ur  1.014    1.010-1.030  

 

 Esterase-Urine  2+    Negative  

 

 Nitrite  NEGATIVE    Negative  

 

 Urobilinogen-Ur-DIP  NEGATIVE    Negative  

 

 Protein-Urine  NEGATIVE    Negative  

 

 PH-Urine  5.5    5-9  

 

 Blood-Urine  NEGATIVE    Negative  

 

 Ketones-Urine  NEGATIVE    Negative  

 

 Bilirubin-Ur  NEGATIVE    Negative  

 

 Glucose-Urine  NEGATIVE    Negative  

 

 WBC-Urine  20-25    0-5  

 

 RBC-Urine  0-2    0-2  

 

 Epith Cells-Ur  FEW    None  

 

 Bacteria-Urine  4+    None  









 Urine Culture &  2010  Urine Culture  ENTEROBACTER AER <SEE      9



 Sensitivi    Sensitivi  NOTE>      

 

 Anaerobic Culture  2010  Anaerobic Culture  NG5      10



 Bottle    Bottle        

 

 Blood Culture  2010  Aerobic Culture Bottle  NG5      11

 

 Ursc-1  2010  Ampicillin  >=32      









 Amikacin  <=2      

 

 Ciprofloxacin  <=0.25      

 

 Ceftriaxone  8      

 

 Cefazolin  >=64      

 

 Nitrofurantoin  64      

 

 Gentamicin  <=1      

 

 Imipenem  2      

 

 Levofloxacin  <=0.12      

 

 Trimeth-Sulfa  <=20      

 

 Ceftazidime  16      

 

 Tigecycline  <=0.5      

 

 Piperacillin/Tazobactam  8      









 Surgical Pathology  2010  Surgical Pathology  ---------------- <SEE      
12



       NOTE>      

 

 Laboratory test  2010  Release Date  8/7/10      13, 14



 finding            

 

 Type And Screen  2010  Patient Blood Type  O POSITIVE      13









 Antibody Screen  NEGATIVE      13

 

 Specimen Discard Date  8/12/10      13, 15









 Basic Metabolic Panel  2010  Sodium  141 mmol/L    135-145  13









 Potassium  5.4 mmol/L  High  3.5-5.0  13

 

 Chloride  109 mmol/L    101-111  13

 

 Co2 (Carbon Dioxide)  22.0 mmol/L    22-32  13

 

 Anion Gap  10.0 mmol/L    2-11  13, 16

 

 Glucose  54 mg/dL  Low    13, 17

 

 BUN  49 mg/dL  High  6-24  13

 

 Creatinine  2.23 mg/dL  High  0.50-1.40  13

 

 One Over Creatinine  0.40      13

 

 BUN/Creatinine Ratio  22.0  High  8-20  13

 

 Calcium  10.2 mg/dL  High  8.1-9.9  13, 18

 

 eGFR Non-  23.4    > 60  13

 

 eGFR   28.3    > 60  13, 19









 Laboratory test finding  2010  PTT (Aptt)  29.4    25.15-38.53  13, 20

 

 Protime  2010  Inr  1.05  High  0.97-1.03  13, 21









 Protime  12.4 SEC  High  11.5-12.2  13, 22









 CBC With Electronic Diff  2010  White Blood Count  9.1 CUMM    4.8-10.8  
13









 Red Cell Count  2.77 CUMM  Low  4.2-5.4  13

 

 Hemoglobin  8.6 g/dL  Low  12.0-16.0  13

 

 Hematocrit  26 %  Low  35-47  13

 

 Mean Corpuscular Volume  94 um3    79-97  13

 

 Mean Corpuscular Hemoglob  31 pg    27-31  13

 

 Mean Corpuscular HGB [facility]  33 g/dL    32-36  13

 

 Redcell Distribution WDTH  16 %  High  10.5-15  13

 

 Platelet Count  356 CUMM    150-450  13

 

 Mean Platelet Volume  7.1 um3  Low  7.4-10.4  13

 

 Gran %  69.2 %    38-83  13

 

 Lymph %  19.2 %  Low  25-47  13

 

 Mononuclear %  8.0 %    1-9  13

 

 Eosinophil %  3.2 %    0-6  13

 

 Basophil %  0.4 %    0-2  13

 

 Abs Lymphs  1.7    1.0-4.8  13

 

 Abs Mononuclear  0.7    0-0.8  13

 

 Absolute Neutrophil Count  6.3    1.5-7.7  13

 

 Abs Eosinophils  0.3    0-0.6  13

 

 Abs Basophils  0    0-0.2  13









 1  : [id number] JOSE DUKE

 

 2  : [id number] NORI GREYSON

 

 3  *******Because ethnic data is not always readily available,



   this report includes an eGFR for both -Americans and



   non- Americans.****



   The National Kidney Disease Education Program (NKDEP) does



   not endorse the use of the MDRD equation for patients that



   are not between the ages of 18 and 70, are pregnant, have



   extremes of body size, muscle mass, or nutritional status,



   or are non- or non-.



   According to the National Kidney Foundation, irrespective of



   diagnosis, the stage of the disease is based on the level of



   kidney function:



   Stage Description                      GFR(mL/min/1.73 m(2))



   1     Kidney damage with normal or decreased GFR       90



   2     Kidney damage with mild decrease in GFR          60-89



   3     Moderate decrease in GFR                         30-59



   4     Severe decrease in GFR                           15-29



   5     Kidney failure                       <15 (or dialysis)

 

 4  Anion gap measurement may be of limited value in the



   presence of any alkalosis, especially in a combined acid



   base disorder.



   .

 

 5  ** Note change in reference range as of 08.  The



   change was based on recommendations from the American



   Diabetes Association.

 

 6  Please note change in reference range effective 08



   .

 

 7  A metabolite of Naproxen, O-desmethylnaproxen, has been



   shown to interfere with the Jendrassik-Rosa method for



   measuring total bilirubin.  Samples from patients who have



   taken Naproxen have shown spurious elevation in total



   bilirubin levels.

 

 8  *******Because ethnic data is not always readily available,



   this report includes an eGFR for both -Americans and



   non- Americans.****



   The National Kidney Disease Education Program (NKDEP) does



   not endorse the use of the MDRD equation for patients that



   are not between the ages of 18 and 70, are pregnant, have



   extremes of body size, muscle mass, or nutritional status,



   or are non- or non-.



   According to the National Kidney Foundation, irrespective of



   diagnosis, the stage of the disease is based on the level of



   kidney function:



   Stage Description                      GFR(mL/min/1.73 m(2))



   1     Kidney damage with normal or decreased GFR       90



   2     Kidney damage with mild decrease in GFR          60-89



   3     Moderate decrease in GFR                         30-59



   4     Severe decrease in GFR                           15-29



   5     Kidney failure                       <15 (or dialysis)

 

 9  ENTEROBACTER AEROGENES



   100^,000 ORGANISMS/ML (MANY)^CCU



   NORMAL CONCHA



   25^10-25,000 ORGANISMS/ML (MODERATE)^CCU

 

 10  NO GROWTH AFTER 5 DAYS

 

 11  NO GROWTH AFTER 5 DAYS

 

 12  ---------------------------------------------------------------------------
----



   -------------



   



   RUN DATE: 08/12/10               [facility] NMI **LIVE**



   PAGE 1



   RUN TIME: 1215                            Specimen Inquiry



   RUN USER: INTERFACE



   -----------------------------------------------------------------------------
--



   -------------



   



   Name: MONIQUE PLASCENCIA                   Acct#: [id number]      Status: DIS IN



   Re/05/10



   Age/Sex: 65/F                         Unit#: [id number]       Location: [facility]. : 44



   -----------------------------------------------------------------------------
--



   -------------



   



   



   Specimen: [id number]    [facility]   Spec Date: 08/05/10        Subm Dr: Herbie navarro MD



   Spec Type: SURGICAL P          Received:  08/05/   Copies to:



   



   



   SPECIMEN



   



   L3 TO L5 LAMINA AND SPINUS PROCESS AND LIGAMENT



   



   HISTORY



   



   PRE-OP DIAGNOSIS:    L3/L5 Lumbar stenosis.



   



   



   



   GROSS DESCRIPTION



   



   The specimen is received in formalin labelled Monique Plascencia, L3 to L5



   Lamina and Spinous Process and Ligament, and consists of multiple



   fragments of bone and dense fibrous tissue measuring in aggregate



   5.0 x 4.0 x 2.5 cm. Representative section, one cassette after decal.



   



   ******DIAGNOSIS******



   



   L3-5 lamina and spinous process and ligament, laminectomy:



   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



   changes.



   B)   Normocellular bone marrow with mixed trilineal hematopoiesis.



   C)   Ligament tissue with dense fibrosis and myxoid degeneration.



   



   Signed Electronically by: FLAVIO LEVY MD 08/12/10 1214



   



   -----------------------------------------------------------------------------
--



   -------------



   



   



   



   



   



   



   



   



   



   



   



   



   



   



   



   



   -----------------------------------------------------------------------------
--



   -------------



   



   DEPARTMENT OF PATHOLOGY, [address] [place] [address]



   Phone #[phone]   Fax #[phone]   [facility] Permit #[id number]



   010



   Flavio Levy M.D. Director   Ayo Mckeon M.D. Assistant Dir



   veena



   -----------------------------------------------------------------------------
--



   -------------

 

 

 

 14  ANY BLOOD NOT GIVEN WILL BE RELEASED AT 0700 ON THE



   ABOVE DATE UNLESS DOCTOR NOTIFIES LAB OTHERWISE.

 

 15  PREADMISSION TESTING SAMPLES FOR BLOOD BANK WILL BE HELD FOR



   14 DAYS FROM THE DATE OF COLLECTION *IF* THE FOLLOWING



   CRITERIA ARE MET:



   



   1) THE PATIENT HAS *NOT* BEEN PREGNANT IN THE LAST 3 MONTHS.



   2) THE PATIENT HAS *NOT* BEEN TRANSFUSED IN THE LAST 3



   MONTHS.



   ============================================================



   PREADMISSION TESTING SAMPLES WILL *NOT* BE HELD FOR 14 DAYS



   FROM PATIENTS WHO IN THE LAST 3 MONTHS:



   



   1) HAVE BEEN PREGNANT



   2) HAVE BEEN TRANSFUSED



   



   THESE PATIENTS *MUST* BE COLLECTED WITHIN 3 DAYS OF THE



   SURGERY DATE.

 

 16  Anion gap measurement may be of limited value in the



   presence of any alkalosis, especially in a combined acid



   base disorder.



   .

 

 17  ** Note change in reference range as of 08.  The



   change was based on recommendations from the American



   Diabetes Association.

 

 18  Please note change in reference range effective 08



   .

 

 19  *******Because ethnic data is not always readily available,



   this report includes an eGFR for both -Americans and



   non- Americans.****



   The National Kidney Disease Education Program (NKDEP) does



   not endorse the use of the MDRD equation for patients that



   are not between the ages of 18 and 70, are pregnant, have



   extremes of body size, muscle mass, or nutritional status,



   or are non- or non-.



   According to the National Kidney Foundation, irrespective of



   diagnosis, the stage of the disease is based on the level of



   kidney function:



   Stage Description                      GFR(mL/min/1.73 m(2))



   1     Kidney damage with normal or decreased GFR       90



   2     Kidney damage with mild decrease in GFR          60-89



   3     Moderate decrease in GFR                         30-59



   4     Severe decrease in GFR                           15-29



   5     Kidney failure                       <15 (or dialysis)

 

 20  PLEASE NOTE NEW REFERENCE RANGE EFFECTIVE 09.

 

 21  Recommended INR for Patients



   on Oral Anticoagulants



   Prophylaxis                       2.0 - 3.0



   Treatment of thrombosis           2.0 - 3.0



   Prevention of embolism            2.0 - 3.0



   Prevention of embolism from



   prosthetic heart valves         2.5 - 3.5

 

 22  DIAGNOSIS,TREATMENT,AND THERAPY MUST BE BASED ON THE INR



   VALUE ALONE.

 

 23  Lymphopenia %



   Anemia







Procedures







 Date  CPT Code  Description  Status

 

 201880  Removal Implant Deep Wire,Screw Nail,Stevie Or Plate  Completed

 

 201880  Removal Implant Deep Wire,Screw Nail,Stevie Or Plate  Completed

 

 01/10/2017  89615  Walking Cast  Completed

 

 2016  27716  Walking Cast  Completed

 

 12/10/2016  48845  EKG, Interpretation Only  Completed

 

 2016  29029  Walking Cast  Completed

 

 2016  35931  Walking Cast  Completed

 

 11/15/2016  47908  Walking Cast  Completed

 

 2016  96651  Walking Cast  Completed

 

 10/25/2016  52401  Walking Cast  Completed

 

 10/20/2016  90894  Walking Cast  Completed

 

 10/13/2016  16601  Short Leg Cast  Completed

 

 10/06/2016  40754  Short Leg Cast  Completed

 

 2016  95896  ORIF Open TX Bimalleolar Ankle FX Includes Internal  
Completed



     Fixation  

 

 2016  49833  ORIF Open TX Bimalleolar Ankle FX Includes Internal  
Completed



     Fixation  

 

 2016  12452  EEG Recording Awake & Asleep  Completed

 

 2016  16725  EEG Recording Awake & Drowsy  Completed

 

 2016  88972  EKG, Interpretation Only  Completed

 

 2016  65328  ECHO Transthorasic Realtime 2D W Doppler & Color Flow  
Completed



     Hosp  

 

 2014  54389  EKG Tracing & Interpretation  Completed

 

 11/15/2013  91518  Stress Test  Completed

 

 11/15/2013  05289  Myocardial Perfusion Imaging Tomographic (Spect)  Completed



     Multiple Studies  

 

 2013  35735  EKG Tracing & Interpretation  Completed

 

 2013  54849  EKG, Interpretation Only  Completed

 

 2013  54513  ECHO Transthorasic Realtime 2D W Doppler & Color Flow  
Completed



     Hosp  

 

 2010  26448  Díaz/Facet/Foraminotomy;Ea Addl Segment; Cerv, Thora, Or  
Completed



     Lumbar  

 

 2010  93969  Díaz/Facet/Foraminotomy;Vertebral Segment; Lumbar  Completed







Encounters







 Type  Date  Location  Provider  CPT E/M  Dx

 

 Office Visit  05/10/2018  8:15a  [facility]  Maryam Dodson D.O.  [address]29  
I69.320









 I48.0

 

 T81.4xxS

 

 E11.9









 [facility]  [address]  
S91.002A



     [facility]  M.D.    









 I10

 

 E11.610

 

 Z78.9









 [facility]  2018 10[facility]17a  [facility]  [address]802  
S91.002A



     [facility]  M.D.    









 I10

 

 E11.610

 

 Z78.9









 [facility]  2018 10[facility]13a  [facility]  [address]  
S91.002A



     [facility]  [facility]    









 I10

 

 E11.610

 

 Z78.9









 Office Visit  2018 10:12a  [facility]  Iris Spivey,  [address]26  
S91.002A



     CHRISTINE MEDINA    









 E11.610









 Office Visit  2018 10:12a  [facility]  Kaci Ohara,  81670  
S91.002A



     [facility], [facility]  M.D.    









 I10

 

 E11.610

 

 Z78.9









 Office Visit  2018 10:00a  [facility]  Naren Khan,  
94095  M14.622



     CHRISTINE MEDINA    

 

 Office Visit  2018  9:30a  [facility]  Swathi Parksmisha,  02301  N39.0



       NP    









 R31.0









 Office Visit  2018  9:30a  [facility]  Allison Alex MD  83457  
I69.320









 M25.572

 

 I48.0

 

 M25.512

 

 E11.9

 

 I10









 Office Visit  2018  8:15a  [facility]  Linda Rudd NP  56268  
M25.572

 

 Office Visit  2018  9:15a  [facility]  Naren Bill,  53198  
M19.172



     Of CHRISTINE MEDINA    

 

 Office Visit  2017  8:45a  [facility]  Swathi KaseyANTWON cabral  [address]  
M25.512

 

 Office Visit  2017  8:15a  [facility]  Swathimarlene Mallory NP  96445  
I63.10









 I48.0

 

 Z51.81

 

 Z79.01









 Office Visit  2017  8:30a  [facility]  Linda Rudd NP  65634  
S70.312A









 S70.311A









 Office Visit  2017 10:00a  [facility]  Shaka Velasquez MD  17[address]  
I63.40









 S82.842G

 

 R47.01

 

 Z99.3









 Office Visit  2017  8:15a  [place]  [address]  I48.2









 Z79.01

 

 I10

 

 E03.9

 

 Z79.4

 

 E11.9

 

 E78.5

 

 Z99.3









 Office Visit  2017  8:00a  [facility]  Linda Rudd NP  [address]28  R05

 

 Office Visit  10/17/2017  8:45a  [facility]  Naren Khan,  54761  
S82.842D



     Of CHRISTINE MEDINA    









 M14.672









 Office Visit  10/04/2017  8:00a  [place] Jerardo [place]udd [place]  [address]  
M25.572

 

 Office Visit  2017  8:00a  [facility]  Kaci Ohara M.D.  70864  
I63.10









 I48.0

 

 Z51.81

 

 Z79.01









 Office Visit  2017 10:00a  [facility]  Kaci Ohara M.D.  75005  
I63.10









 I48.0

 

 Z51.81

 

 Z79.01









 Office Visit  2017 10:15a  [facility]  Naren Khan,  
46602  M14.672



     CHRISTINE MEDINA    









 M25.372









 Office Visit  2017 10:30a  [facility]  Naren Khan  71595  
S82.842D



     Of CHRISTINE MEDINA    

 

 Office Visit  2017 10:00a  [facility]  Naren Bill  19427  
S82.842D



     Of CTALHA MEDINA    

 

 Office Visit  2017  9:30a  [facility]  Naren Khan  64796  
S82.842D



     Of CHRISTINE MEDINA    

 

 Office Visit  01/10/2017  9:30a  [facility]  Naren Khan  00051  
S82.842D



     Of CHRISTINE MEDINA    

 

 Office Visit  2016  9:15a  [facility]  Naren Khan  36190  
S82.842D



     Of CHRISTINE MEDINA    









 S82.842D









 Office Visit  12/15/2016 12:55p  [facility],  Stephanie Varela,  
79520  K92.2



     Hospitalists  M.DLaisha    









 I48.91

 

 E11.8

 

 I10









 Office Visit  2016 12:55p  [facility],  Stephanie Varela,  
57179  K92.2



     Hospitalists  M.D.    









 I48.91

 

 E11.8

 

 I10









 Office Visit  2016 12:55p  [facility],  Stephanie Varela,  
97713  K92.2



     Hospitalists  M.D.    









 I48.91

 

 E11.8

 

 I10









 Office Visit  2016 12:54p  [facility],  Stephanie Varela,  
92676  K92.2



     Hospitalists  M.D.    









 I48.91

 

 E11.8









 Office Visit  2016 12:54p  [facility],  Kaci Ohara,  
03165  K92.2



     Hospitalists  M.D.    









 I48.91

 

 E11.8

 

 I10









 Office Visit  12/10/2016 12:53p  [facility],  Kaci Ohara,  
49837  K92.2



     Hospitalists  M.D.    









 I48.91

 

 E11.8

 

 I10









 Office Visit  2016 12:53p  [facility],  Chuy Cleveland,  
14644  K92.2



     Hospitalists  PREMA    









 I48.91

 

 E11.8

 

 I10









 Office Visit  2016  9:15a  [facility]  Naren Bill,  59102  
S82.842D



     Of CHRISTINE MEDINA    









 S82.842D









 Office Visit  2016  2:32p  [facility]  [address]
  E11.9



     Assoc,pc  PA    



     Hospitalists      









 Z96.7

 

 Z78.9

 

 Z87.81









 [facility]  2016  2:31p  [facility]  [address]
  E11.9



     Assoc,pc  PA    



     Hospitalists      









 Z96.7

 

 Z78.9

 

 Z87.81









 Office Visit  2016  2:28p  [facility]  Todd JamesFranklin,  57417
  E11.9



     Assoc,pc  PA    



     Hospitalists      









 Z96.7

 

 Z78.9

 

 Z87.81









 Office Visit  2016  3:50p  [facility]  Iris Spivey,  37034  
S82.842A



     CHRISTINE MEDINA    

 

 Office Visit  2016  4:13p  [facility]  Naren Bill,  
03548  S82.852A



     CHRISTINE MEDINA    

 

 Office Visit  2016  9:15a  [facility]  Angeles Ken MD  
85875  I69.320









 I10

 

 S82.842A

 

 Z79.01









 Office Visit  2016  7:00a  [facility]  Iris Georgeke,  87587  
S82.842A



     CHRISTINE MEDINA    

 

 Office Visit  2016  1:49p  [facility],  Chuy Cleveland,  
25137  R47.01



     Hospitalists  N.PLaisha    









 I48.2

 

 N30.01

 

 I63.412









 Office Visit  2016  2:43p  [facility]  Fred Frankenberg II,  85200  
R47.01



     Assoc, Kan MEDINA    









 I48.2

 

 N30.01

 

 I63.412









 Office Visit  2016 12:44p  [facility],  Don Romero,  
76292  I63.9



     Hospitalists  M.D.    









 E11.9

 

 E66.01

 

 Z79.4









 Office Visit  08/15/2016 12:43p  [facility],  Don Romero,  
56874  I63.9



     Hospitalists  M.D.    









 E11.9

 

 E66.01

 

 Z79.4









 Office Visit  2016 11:33a  [facility]  Francis HANNAH  40596  
I63.512



       CAROL Rosario    









 I65.29

 

 I10

 

 I48.91









 Office Visit  2016 12:43p  [facility],  Kaci Ohara,  
78327  I63.9



     Hospitalists  M.DLaisha    









 E11.9

 

 E66.01

 

 Z79.4









 Office Visit  2016 12:12p  [facility]  Francis HANNAH  28776  
I63.512



       CAROL Rosario    









 I48.91

 

 I65.29

 

 I10









 Office Visit  2016 12:42p  [facility],  Kaci Lev,  
24352  I63.9



     Hospitalists  M.DLaisha    









 E11.9

 

 E66.01

 

 Z79.4









 Office Visit  2016 12:09p  [facility]  Nelia Aden,  
54541  I63.512



       M.D.    









 R25.1

 

 R40.0

 

 I10

 

 I48.91









 Office Visit  2016 12:42p  [facility],  Shaka Velasquez MD  
23214  I63.9



     Hospitalists      









 E11.9

 

 E66.01

 

 Z79.4









 Office Visit  2016 12:41p  [facility],  Shaka Velasquez MD  
75768  I63.9



     Hospitalists      









 E11.9

 

 E66.01

 

 Z79.4









 Office Visit  2016 12:06p  [facility]  Nelia Aden,  
67023  I63.512



       M.D.    









 I10

 

 I48.91

 

 R25.1

 

 R40.0









 Office Visit  08/10/2016  1:56p  [facility]  Nelia Aden  
36972  I63.512



       [facility]    









 I10

 

 I48.91

 

 I65.29









 Office Visit  08/10/2016 12:41p  [facility],  Shaka Velasquez MD  
27232  I63.9



     Hospitalists      









 E11.9

 

 E66.01

 

 Z79.4









 Office Visit  2016 12:40p  [facility],  Shaka Velasquez MD  
19529  I63.9



     Hospitalists      









 E11.9

 

 E66.01

 

 Z79.4









 Office Visit  2016  9:22a  [facility]  Greg Chrissie,  
35796  I63.512



       MD    









 I10

 

 I48.91









 Office Visit  2016 12:39p  [facility],  Sedrick Lara M.D.  
53357  G45.9



     Hospitalists      









 E11.9

 

 E66.01

 

 Z79.4









 Office Visit  2014  9:30a  [facility] MARLENA Mayes,  
02226  427.31



     Anthony MEDINA    

 

 Office Visit  2013  1:00p  [facility] MARLENA Mayes,  
43706  427.31



     Anthony BOYLE.MARLENA    









 786.50

 

 V72.81

 

 794.39









 Office Visit  2013  3:51p  [facility],  Sedrick Lara M.D.  
51821  578.1



     Hospitalists      









 280.0









 Office Visit  2013  3:50p  [facility],  Sedrick Lara M.D.  
61032  578.1



     Hospitalists      









 280.0









 Office Visit  2013  3:50p  [facility],  Stephanie Varela,  
79138  578.1



     Hospitalists  M.D.    









 280.0









 Office Visit  2013  3:50p  [facility],  Stephanie Varela,  
91940  578.1



     Hospitalists  M.D.    









 280.0









 Office Visit  2013  3:36p  [facility],  Stephanie Varela,  
81928  285.1



     Hospitalists  M.DLaisha    









 578.9

 

 411.89









 Office Visit  2013  3:36p  [facility],  Stephanie Varela,  
80830  285.1



     Hospitalists  M.DLaisha    









 578.9

 

 411.89









 Office Visit  2013  3:36p  [facility],  Stephanie Varela,  
38937  285.1



     Hospitalists  M.DLaisha    









 578.9

 

 411.89









 Office Visit  2013  4:07p  [facility]  Keith Mustafa M.D.,  
55196  794.31



     [facility], FSCAI    









 411.1

 

 424.1









 Office Visit  2013  3:35p  [facility],  Stephanie Varela,  
32952  285.1



     Hospitalists  M.MARLENA    









 578.9

 

 411.89









 Office Visit  2013  3:35p  [facility],  Stephanie Varela,  
01343  285.1



     Hospitalists  M.DLaisha    









 578.9

 

 794.31









 Office Visit  2013  1:20p  [facility]  Herbie Marcano,  
79071  724.02



     Of Cma  M.D.    

 

 Office Visit  2012  1:20p  [facility]  Herbie Marcano,  
82651  724.02



     Of [facility]  M.D.    

 

 Office Visit  2011  3:00p  [facility]  Herbei Marcano  
23497  724.02



     Of [facility]  M.D.    

 

 Office Visit  2011 11:00a  [facility]  Herbie Marcano,  
58908  724.02



     Of [facility]  M.D.    

 

 Office Visit  2011 11:40a  [facility]  Herbie Marcano,  
14370  724.02



     Of [facility]  M.D.    

 

 Office Visit  2011  9:40a  [facility]  Herbie Marcano,  
43455  724.02



     Of [facility]  M.D.    

 

 Office Visit  2010  3:40p  [facility]  Herbie Marcano  
98904  724.02



     Of [facility]  M.D.    

 

 Office Visit  2010 10:40a  [facility]  Herbie Marcano  
23927  724.02



     Of [facility]  M.D.    

 

 Office Visit  06/15/2010  1:40p  [facility]  Herbie Marcano  
38283  781.2



     Of [facility]  M.D.    

 

 Office Visit  2010  9:20a  [facility]  Herbie Marcano  
29458  781.2



     Of [facility]  M.D.    

 

 Office Visit  2010  1:40p  [facility]  Herbie Marcano,  
53266  781.2



     Of [facility]  M.DLaisha    







Plan of Care

Future Appointment(s):2018  9:15 am - [facility]TIMOTHY.2018  9:30 am - [facility]2018 - Naren Khan M.D.S91.002D Unspecified open 
wound, left ankle, subsequent encounterFollow up:7-10 days

## 2018-07-04 NOTE — XMS REPORT
Monique Plascencia

 Created on:2018



Patient:Monique Plascencia

Sex:Female

:1945

External Reference #:[id number]





Demographics







 Address  [address] [place] [address]

 

 Home Phone  [phone]

 

 Mobile Phone  [phone]

 

 Email Address  [email or web address]

 

 Preferred Language  English

 

 Marital Status  Not  Or 

 

 Presybeterian Affiliation  Unknown

 

 Race  White

 

 Ethnic Group  Not  Or 









Author







 Organization  [facility]

 

 Address  [address]



   [place] [address]

 

 Phone  [phone]









Support







 Name  Relationship  Address  Phone

 

 Agustina Xavier  Unavailable  Unavailable  [phone]

 

 Elva Plascencia  Unavailable  Unavailable  [phone]

 

 Matt Salmeron  Unavailable  Unavailable  [phone]

 

 Whitney Sparks  Unavailable  Unavailable  [phone]

 

 Suzie Jesi  Unavailable  Unavailable  [phone]









Care Team Providers







 Name  Role  Phone

 

 Patient's Choice  Primary Care Physician  Unavailable









Payers







 Type  Date  Identification Numbers  Payment Provider  Subscriber

 

 Medicare Primary  Effective:  Policy Number:  Medicare  Monique Plascencia



   1995  [id number]    









 PayID: [id number]  [address]









 [place] [address]









 Medigap Part B  Effective: 2013  Policy Number:  BS Facets  Monique Plascencia



     [id number]    









 Expires: 2017  PayID: [id number]  [address] [address]









 [place] [address]









 Medigap Part B  Effective: 2017  Policy Number: [id number]  Medicaid  
Monique Plascencia









 Expires: 2017  Group Name: 1 1  [address]









 PayID: [id number]  [place] [address]









 Commercial    Policy Number: [id number]  [place]  Monique Plascencia









 PayID: [id number]  [address]









 [place] [address]







Problems







 Date  Description  Provider  Status

 

 Onset: 2013  Atrial fibrillation  Kaz Mayes M.D.  Active

 

 Onset: 2013  Chest pain  Kaz Mayes M.D.  Active

 

 Onset: 2013  Preoperative cardiovascular  Kaz Mayes M.D.  Active



   examination    

 

 Onset: 2013  Abnormal results of cardiovascular  Kaz Mayes M.D.  
Active



   function studies    

 

 Onset: 2018  Arthropathy associated with a  Naren Khan M.D.  Active



   neurological disorder    

 

 Onset: 2018  Teo hematuria  Swathi Mallory NP  Active

 

 Onset: 2018  Urinary tract infectious disease  Swathi Mallory NP  
Active

 

 Onset: 2018  Arthralgia of the ankle and/or  Allison Alex MD  
Active



   foot    

 

 Onset: 2018  Paroxysmal atrial fibrillation  Allison Alex MD  
Active

 

 Onset: 2018  Shoulder joint pain  Allison Alex MD  Active

 

 Onset: 2018  Aphasia  Allison Alex MD  Active

 

 Onset: 2018  Essential hypertension  Allison Alex MD  Active

 

 Onset: 2018  Type 2 diabetes mellitus  Allison Alex MD  Active

 

 Onset: 2018  Localized, secondary  Naren Khan M.D.  Active



   osteoarthritis of the ankle and/or    



   foot    

 

 Onset: 2018  Cerebral infarction due to  Swathi Mallory NP  Active



   embolism of precerebral arteries    

 

 Onset: 2018  Medication monitoring  Swathi Mallory NP  Active

 

 Onset: 2018  Long-term current use of  Swathi Mallory NP  Active



   anticoagulant    

 

 Onset: 2018  Abrasion and/or friction burn of  Linda Rudd, NP  
Active



   lower limb without infection    

 

 Onset: 2018  Abrasion and/or friction burn of  Linda Rudd, NP  
Active



   lower limb without infection    

 

 Onset: 2018  Cerebral infarction due to  Shaka Velasquez MD  Active



   embolism of cerebral arteries    

 

 Onset: 2018  Displ bimalleol fx l low leg, subs  Shaka Velasquez MD  
Active



   for clos fx w delay heal    

 

 Onset: 2018  Dependence on wheelchair  Shaka Velasquez MD  Active

 

 Onset: 2018  Chronic atrial fibrillation  Shaka Velasquez MD  Active

 

 Onset: 2018  Hypothyroidism  Shaka Velasquez MD  Active

 

 Onset: 2018  Long-term current use of insulin  Shaka Velasquez MD  Active

 

 Onset: 2018  Hyperlipidemia  Shaka Velasquez MD  Active

 

 Onset: 2018  Cough  Linda Rudd NP  Active

 

 Onset: 2018  Displ bimalleol fx l low leg, subs  Naren Khan M.D.  
Active



   for clos fx w routn heal    

 

 Onset: 2018  Charcot's joint of foot  Naren Khan M.D.  Active

 

 Onset: 2018  Joint derangement  Naren Khan M.D.  Active

 

 Onset: 2018  Gastrointestinal hemorrhage  Stephanie Varela M.D.  Active

 

 Onset: 2018  Diabetes mellitus  Stephanie Varela M.D.  Active

 

 Onset: 2018  Electrocardiogram abnormal  Ty Chip Jean M.D.,  Active



     FACC, FASNC  

 

 Onset: 2018  Pressure ulcer of unspecified  Betty Matias, RPA-C  Active



   heel, stage 3    

 

 Onset: 2018  Implantation of joint prosthesis  JAILENE Sky
  Active

 

 Onset: 2018  Other specified health status  JAILENE Sky  
Active

 

 Onset: 2018  H/O: fracture  JAILENE Sky  Active

 

 Onset: 2018  Encounter for other orthopedic  Betty Polly, RPA-C  Active



   aftercare    

 

 Onset: 2018  Closed bimalleolar fracture  Betty Londonjoy, RPA-C  Active

 

 Onset: 2018  Closed trimalleolar fracture  Naren Khan M.D.  Active

 

 Onset: 2018  Aphasia as late effect of  Angeles Ken MD  Active



   cerebrovascular accident    

 

 Onset: 2018  Acute hemorrhagic cystitis  Chuy Cleveland, N.P.  Active

 

 Onset: 2018  Cerebral infarction due to  Chuy Cleveland, N.P.  Active



   embolism of middle cerebral artery    

 

 Onset: 2018  Cerebral artery occlusion  Don Romero M.D.  Active

 

 Onset: 2018  Morbid obesity  Don Romero M.D.  Active

 

 Onset: 2018  Occlusion and stenosis of middle  Francis Rosario M.D.  
Active



   cerebral artery with infarction    

 

 Onset: 2018  Carotid artery occlusion  Francis Rosario M.D.  Active

 

 Onset: 2018  Abnormal involuntary movement  Nelia Cowdery, M.D.  Active

 

 Onset: 2018  Disturbance of consciousness  Nelia Aden M.D.  Active

 

 Onset: 2018  Athscl heart disease of native  Keith Mustafa M.D., [facility],  
Active



   coronary artery w/o ang pctrs  [facility]  

 

 Onset: 2018  Transient cerebral ischemia  Sedrick Lara M.D.  Active

 

 Onset: 2018  Electrocardiogram abnormal  Kaz Mayes M.D.  Active

 

 Onset: 2018  Pre-surgery evaluation  Kaz Mayes M.D.  Active

 

 Onset: 2018  Melena  Sedrick Lara M.D.  Active

 

 Onset: 2018  Anemia due to chronic blood loss  Sedrick Lara M.D.  Active

 

 Onset: 2018  Acute posthemorrhagic anemia  Stephanie Varela M.D.  Active

 

 Onset: 2018  Gastrointestinal hemorrhage  Stephanie Varela M.D.  Active

 

 Onset: 2018  Ischemic heart disease  Stephanie Varela M.D.  Active

 

 Onset: 2018  Impending infarction  Keith Mustafa M.D., [facility],  Active



     [facility]  

 

 Onset: 2018  Aortic valve disorder  Keith Mustafa M.D., [facility],  Active



     [facility]  

 

 Onset: 2018  Edema  Ty Chip Jean M.D.,  Active



     [facility]  

 

 Onset: 2018  Spinal stenosis of lumbar region  Herbie Marcano M.D.  
Active

 

 Onset: 2018  Postsurgical Status Other  Herbie Marcano M.D.  Active

 

 Onset: 2018  Postsurgical Status Other  Herbie Marcano M.D.  Active

 

 Onset: 2018  Abnormal gait  Herbie Marcano M.D.  Active







Family History







 Date  Family Member(s)  Problem(s)  Comments

 

   General  Lung Cancer  father

 

   General  Arthritis  bother, sister

 

   General  Pulmonary Embolism (Pe)  father







Social History







 Type  Date  Description  Comments

 

 Lives With    Assisted living  

 

 Occupation    Retired  

 

 ETOH Use    Denies alcohol use  

 

 Smoking    Patient is a former smoker  pack and half a day, quit in



       

 

 Recreational Drug Use    Denies Drug Use  

 

 Exercise Type/Frequency    Does not exercise  







Allergies, Adverse Reactions, Alerts







 Date  Description  Reaction  Status  Severity  Comments

 

 2013  Dilaudid    active    

 

 2018  Hydromorphone Hydrochloride    active    

 

 2018  Rivaroxaban    active    







Medications







 Medication  Date  Status  Form  Strength  Qnty  SIG  Indications  Ordering



                 Provider

 

 Warfarin Sodium    Active  Tablets  3mg  90tab  1 tab every  I48.91  
        s  night or as    Touchton,



             directed    NP

 

 Levothyroxine    Active  Tablets  125mcg  30tab  1 by mouth  E03.9  Swathi



 Sodium          s  every day    ANTWON Mallory

 

 Oxycodone HCL    Active  Tablets  5mg  30tab  1 tab PO Q  R52          s  8 hrs prn    ANTWON Mallory

 

 Acetaminophen    Active  Tablets  500mg    2 tabs PO              bid    ANTWON Mallory

 

 Ferrous Sulfate    Active  Tablets  325(65Fe)  90tab  1 by mouth          mg  s  every day    ANTWON Mallory

 

 Aspercreme    Active  Patches  4%  90uni  apply qam L    Swathi



 Lidocaine  /2018        ts  shoulder,    Touchton,



             remove Q PM    NP

 

 Miralax    Active  Powder  3350NF  1unit  17 gm    Herbie KAMINSKI



           s  mixed w/ 8    abdulaziz Marcano    M.D.



             water/juice    



             On even    



             days, hold    



             for loose    



             stool.    

 

 Furosemide    Active  Tablets  20mg  90tab  1 po qd    Unknown



   /0000        s      

 

 Lantus    Active  Solution  100Unit/M  6Vial  26 units SQ  E11.9  West,



   /0000      L  s  Q hs    Maryam,



                 DO

 

 Atorvastatin    Active  Tablets  40mg  90tab  take 1  E78.5  West,



 Calcium  /0000        s  tablet at    Maryam,



             bedtime on    DO



             Odd days    

 

 Metoprolol    Active  Tablets ER  25mg  90tab  1 by mouth  I10  West,



 Succinate ER  /0000    24HR    s  every day    Maryam,



                 DO

 

 Amiodarone HCL    Active  Tablets  200mg    1 by mouth    West,



   /0000          every day    Maryam,



                 DO

 

 Humalog    Active  Solution  100Unit/M    4u subq    West,



   /0000    Cartridge  L    with    Maryam,



             breakfast,    DO



             6u subq    



             with lunch    



             and dinner    

 

 Milk Of Magnesia    Active  Suspension  400mg/5ML    30    West,



   /0000          milliliters    Maryam,



             by mouth Q    DO



             6hrs as    



             needed for    



             constipatio    



             n    

 

 Tylenol Extra    Active  Tablets  500mg    2 by mouth    West,



 Strength  /0000          Q 24 hrs as    Maryam,



             needed    DO

 

 Ascorbic Acid    Active  Tablets  500mg  30tab  1 atb PO    West,



   /        s  bid    Maryam,



                 DO

 

 Vitamin D3 Ultra    Active  Tablets  88125Ytdf    one by  E55.9  West,



 Potency  /0000          mouth Q    Maryam,



             Month    DO

 

 Baclofen    Active    5mg    q 8hrs prn  G89.29  West,



   /              Maryam,



                 DO

 

 Calazime Skin    Active  Paste      apply to    West,



 Protectant/Carballo  /          buttocks Q    Maryam,



 mine            shift    DO

 

 Keflex    Active  Capsules  250mg    q 8 hours x    Bill,



             10 days    MD Naren

 

 Cholecalciferol    Active  Powder      1000 unit    Unknown



             tablet by    



             mouth every    



             day    

 

 Polyethylene    Active  Packet  3350NF        Unknown



 Glycol 335              

 

 Vitamin C    Active  Capsules  500mg    1 by mouth    Unknown



             every day    

 

                 

 

 Methocarbamol    Hx  Tablets  500mg  45tab  one to two    Herbie KAMINSKI



   /        s  up to qisena Marcano



   -          prn for    M.DLaisha



   10/30          spasm    



   /2013              

 

 Hydrocodone/Acet  10/19  Hx  Tablets  5-325mg  90tab  1-2 po qid    Herbie KAMINSKI



 aminophen  /        s  prn    Brittney Marcano M.D.



   10/31              



   /2013              

 

 Oxycodone HCL    Hx  Tablets  5mg  90tab  1-2 po qid    Herbie KAMINSKI



   /        s  prn    Brittney Marcano M.D.



   10/19              



   /2010              

 

 Valium    Hx  Tablets  5mg  3tabs  1 po 2    Herbie KAMINSKI



   /          hours prior    Jeet



   -          to mri may    M.D.



   10/31          take one    



   /2013          more q 1 hr    



             prn anxiety    

 

 Levothyroxine    Hx  Tablets  125mcg  90tab  1 po qd    Unknown



 Sodium          s      



   -              



   10/07              



   /2017              

 

 Omeprazole    Hx  Capsules DR  20mg  90cap  1 po qd    Unknown



   /        s      



   -              



   10/07              



   /2017              

 

 Multivitamins    Hx  Capsules    30cap  1 capsule    Unknown



           s  dawna;y    



   -              



   10/07              



   /2017              

 

 Oxycodone HCL    Hx        up to 6    Unknown



             tabs po qd    



   -          prn    



                 

 

 Gemfibrozil    Hx  Tablets  600mg  180ta  1 po bid    Unknown



   /0000        bs      



   -              



                 

 

 Gabapentin    Hx  Capsules  100mg  240ca  2 po bid    Unknown



   /0000        ps      



   -              



   10/07              



   /2017              

 

 B-12  00  Hx    1200mg    1 po qd    Unknown



   /0000              



   -              



   10/07              



   /2017              

 

 Glipizide    Hx  Tablets  5mg  60tab  1 po bid    Unknown



   /0000        s      



   -              



                 

 

 Citalopram    Hx  Tablets  20mg  30tab  1 po qd    Unknown



 Hydrobromide  /0000        s      



   -              



                 

 

 Aspirin Ec    Hx  Tablets DR  81mg  100ta  1 by mouth    Unknown



   /0000        bs  every day    



   -              



   10/07              



   /2017              

 

 Glucosamine    Hx  Capsules  1500Com    bid    Unknown



 Chondroitin 1500  /0000              



 Complex Maximum  -              



 Strength  10/01              



   /2017              

 

 Celexa    Hx  Tablets  10mg    1 by mouth    Unknown



   /0000          every day    



   -              



   10/01              



   /2017              

 

 Docusate Sodium    Hx  Capsules  100mg    1 by mouth    Unknown



   /0000          twice a day    



   -              



   10/07              



   /2017              

 

 Humalog Mix    Hx  Suspension  (75-25)10    8 units sq    Unknown



 75/25  /0000      0Unit/ML    twice a day    



   -          with    



   10/17          breakfast    



             and dinner    

 

 Cipro    Hx  Tablets  500mg    1 by mouth    Unknown



   /0000          twice a day    



   -              



   10/07              



   /2017              

 

 Albuterol    Hx  Nebulizer  (2.5mg/3M    1 vial via    Unknown



 Sulfate  /0000      L) 0.083%    nebulizer 4    



   -          times daily    



   10/07          as needed    



   /              

 

 Warfarin Sodium    Hx  Tablets  2.5mg        Unknown



   /              



   -              



                 

 

 Skin Prep Wipes    Hx            Unknown



   /0000              



   -              



                 

 

 Miconazole    Hx  Cream  2%    apply twice    Unknown



 Nitrate  /0000          a day until    



   -          clear    



                 

 

 Hydrocortisone    Hx  Ointment  1%        Unknown



   /              



   -              



                 







Medications Administered in Office







 Medication  Date  Status  Form  Strength  Qnty  SIG  Indications  Ordering



                 Provider

 

 Inj,  11/15/2  Administered  Injection          Kaz MENDIOLA



 Regadenoson,  013              CAROL Mayes



 0.1 MG                

 

 Technetium TC  11/15/2  Administered  Injection          Kaz MENDIOLA



 99M  013              CAROL Mayes



 Tetrofosmin,                



 Per Unit Dose                



 Up To 40                



 Millicuries                







Vital Signs







 Date  Vital  Result  Comment

 

 2018  Heart Rate  58 /min  









 BP Systolic  150 mmHg  

 

 BP Diastolic  48 mmHg  

 

 Respiratory Rate  24 /min  

 

 Pain Level  5  









 2018  Height  65 inches  5'5"









 Weight  303.00 lb  

 

 Heart Rate  68 /min  

 

 Respiratory Rate  15 /min  

 

 Body Temperature  97.5 F  

 

 Pain Level  7  

 

 BMI (Body Mass Index)  50.4 kg/m2  









 2018  Height  65 inches  5'5"









 Weight  303.00 lb  

 

 Heart Rate  80 /min  

 

 BP Systolic Sitting  126 mmHg  

 

 BP Diastolic Sitting  60 mmHg  

 

 Respiratory Rate  16 /min  

 

 Body Temperature  97.0 F  

 

 Pain Level  7  

 

 BMI (Body Mass Index)  50.4 kg/m2  









 2018  Height  65 inches  5'5"









 Heart Rate  66 /min  

 

 Respiratory Rate  20 /min  

 

 Body Temperature  96.1 F  

 

 Pain Level  0  









 2018  Heart Rate  49 /min  









 BP Systolic Sitting  134 mmHg  

 

 BP Diastolic Sitting  64 mmHg  

 

 Body Temperature  96.5 F  









 10/17/2017  Height  65 inches  5'5"









 Weight  229.00 lb  

 

 BP Systolic  115 mmHg  

 

 BP Diastolic  81 mmHg  

 

 Respiratory Rate  16 /min  

 

 Body Temperature  98.2 F  

 

 BMI (Body Mass Index)  38.1 kg/m2  









 2017  Height  65 inches  5'5"









 Weight  266.00 lb  

 

 BP Systolic  115 mmHg  

 

 BP Diastolic  83 mmHg  

 

 Body Temperature  97.7 F  

 

 Pain Level  5  

 

 BMI (Body Mass Index)  44.3 kg/m2  









 2017  Height  65 inches  5'5"









 Weight  266.00 lb  

 

 Heart Rate  72 /min  

 

 BP Systolic  130 mmHg  

 

 BP Diastolic  82 mmHg  

 

 Respiratory Rate  20 /min  

 

 Body Temperature  96.0 F  

 

 Pain Level  0  

 

 BMI (Body Mass Index)  44.3 kg/m2  









 2017  Height  65 inches  5'5"









 Weight  266.00 lb  

 

 Heart Rate  68 /min  

 

 Respiratory Rate  16 /min  

 

 Body Temperature  97.6 F  

 

 Pain Level  4  

 

 BMI (Body Mass Index)  44.3 kg/m2  









 2017  Height  65 inches  5'5"









 Weight  266.00 lb  

 

 Heart Rate  60 /min  

 

 BP Systolic  130 mmHg  

 

 BP Diastolic  68 mmHg  

 

 Respiratory Rate  16 /min  

 

 Pain Level  1  

 

 BMI (Body Mass Index)  44.3 kg/m2  









 01/10/2017  Height  65 inches  5'5"









 Weight  266.00 lb  

 

 Pain Level  0  

 

 BMI (Body Mass Index)  44.3 kg/m2  









 2016  Height  65 inches  5'5"









 Weight  266.00 lb  

 

 Respiratory Rate  18 /min  

 

 Pain Level  0  

 

 BMI (Body Mass Index)  44.3 kg/m2  









 2016  Respiratory Rate  17 /min  









 Body Temperature  98.8 F  

 

 Pain Level  0  









 11/15/2016  Height  65 inches  5'5"









 Weight  266.00 lb  

 

 Heart Rate  60 /min  

 

 Respiratory Rate  16 /min  

 

 Pain Level  0  

 

 BMI (Body Mass Index)  44.3 kg/m2  









 2016  Height  65 inches  5'5"









 Weight  266.00 lb  

 

 Pain Level  0  

 

 BMI (Body Mass Index)  44.3 kg/m2  









 10/25/2016  Height  65 inches  5'5"









 Weight  266.00 lb  

 

 Heart Rate  60 /min  

 

 Respiratory Rate  16 /min  

 

 Pain Level  0  

 

 BMI (Body Mass Index)  44.3 kg/m2  









 10/20/2016  Height  65 inches  5'5"









 Weight  266.00 lb  

 

 Pain Level  0  

 

 BMI (Body Mass Index)  44.3 kg/m2  









 10/13/2016  Height  65 inches  5'5"









 Weight  266.00 lb  

 

 Body Temperature  95.8 F  

 

 BMI (Body Mass Index)  44.3 kg/m2  









 10/06/2016  Height  65 inches  5'5"









 Weight  266.00 lb  

 

 Pain Level  2  

 

 BMI (Body Mass Index)  44.3 kg/m2  









 2016  Height  65 inches  5'5"









 Weight  266.00 lb  

 

 Heart Rate  56 /min  

 

 BP Systolic  120 mmHg  

 

 BP Diastolic  59 mmHg  

 

 BMI (Body Mass Index)  44.3 kg/m2  









 2014  Height  63 inches  5'3"









 Weight  280.00 lb  with shoes

 

 Heart Rate  64 /min  regular

 

 BP Systolic Sitting  102 mmHg  right arm large cuff

 

 BP Diastolic Sitting  58 mmHg  right arm large cuff

 

 BP Systolic Standing  100 mmHg  right arm large cuff

 

 BP Diastolic Standing  54 mmHg  right arm large cuff

 

 Respiratory Rate  18 /min  

 

 BMI (Body Mass Index)  49.6 kg/m2  









 2013  Height  63 inches  5'3"









 Weight  264.00 lb  

 

 Heart Rate  48 /min  

 

 BP Systolic  104 mmHg  Ra large cuff

 

 BP Diastolic  54 mmHg  Ra large cuff

 

 BP Systolic Sitting  104 mmHg  LA large cuff

 

 BP Diastolic Sitting  56 mmHg  LA large cuff

 

 BP Systolic Standing  100 mmHg  LA

 

 BP Diastolic Standing  54 mmHg  LA

 

 Respiratory Rate  16 /min  

 

 BMI (Body Mass Index)  46.8 kg/m2  







Results







 Test  Date  Test  Result  H/L  Range  Note

 

 Laboratory test  2016  Point of Care Glucose  209 mg/dL  High    1



 finding            

 

 Laboratory test  2016  Point of Care Glucose  182 mg/dL  High    2



 finding            

 

 Creatinine  2012  Creatinine  2.0 mg/dL  High  0.50-1.40  









 One Over Creatinine  0.50      

 

 eGFR Non-  24.9    > 60  

 

 eGFR   32.0    > 60  3









 Comp Metabolic Panel  2010  Sodium  137 mmol/L    135-145  









 Potassium  4.6 mmol/L    3.5-5.0  

 

 Chloride  106 mmol/L    101-111  

 

 Co2 (Carbon Dioxide)  22.0 mmol/L    22-32  

 

 Anion Gap  9.0 mmol/L    2-11  4

 

 Glucose  208 mg/dL  High    5

 

 BUN  32 mg/dL  High  6-24  

 

 Creatinine  1.70 mg/dL  High  0.50-1.40  

 

 One Over Creatinine  0.50      

 

 BUN/Creatinine Ratio  18.8    8-20  

 

 Calcium  8.8 mg/dL    8.1-9.9  6

 

 Total Protein  6.5 GM/DL    6.2-8.1  

 

 Albumin  3.2 GM/DL    3.2-5.2  

 

 Globulin  3.3 GM/DL    2-4  

 

 Albumin/Globulin Ratio  1.0    1-3  

 

 Bilirubin Total  0.5 mg/dL    0.4-1.5  7

 

 Alkaline Phosphatase  70 U/L      

 

 Alt (SGPT)  9 U/L  Low  14-54  

 

 Ast (Sgot)  33 U/L    12-42  

 

 eGFR Non-  32.0    > 60  

 

 eGFR   38.7    > 60  8









 CBC With Electronic Diff  2010  White Blood Count  10.6 CUMM    4.8-10.8
  









 Red Cell Count  2.75 CUMM  Low  4.2-5.4  

 

 Hemoglobin  8.6 g/dL  Low  12.0-16.0  

 

 Hematocrit  25 %  Low  35-47  

 

 Mean Corpuscular Volume  92 um3    79-97  

 

 Mean Corpuscular Hemoglob  31 pg    27-31  

 

 Mean Corpuscular HGB Cone  34 g/dL    32-36  

 

 Redcell Distribution WDTH  15 %    10.5-15  

 

 Platelet Count  252 CUMM    150-450  

 

 Mean Platelet Volume  7.3 um3  Low  7.4-10.4  









 Manual Differential  2010  Polysegmented Neutrophil  78 %    38-83  









 Lymphocyte  17 %  Low  25-47  

 

 Monocyte  4 %    0-13  

 

 Eosinophil  1 %    0-6  

 

 Absolute Neutrophil Count  8.2      

 

 RBC Morphology  NORMAL      









 Urinalysis W/Microscopic  2010  Ua Color  YELLOW    Yellow  









 Appearance-Urine  CLEAR    Clear  

 

 Specific Gravity-Ur  1.014    1.010-1.030  

 

 Esterase-Urine  2+    Negative  

 

 Nitrite  NEGATIVE    Negative  

 

 Urobilinogen-Ur-DIP  NEGATIVE    Negative  

 

 Protein-Urine  NEGATIVE    Negative  

 

 PH-Urine  5.5    5-9  

 

 Blood-Urine  NEGATIVE    Negative  

 

 Ketones-Urine  NEGATIVE    Negative  

 

 Bilirubin-Ur  NEGATIVE    Negative  

 

 Glucose-Urine  NEGATIVE    Negative  

 

 WBC-Urine  20-25    0-5  

 

 RBC-Urine  0-2    0-2  

 

 Epith Cells-Ur  FEW    None  

 

 Bacteria-Urine  4+    None  









 Urine Culture &  2010  Urine Culture  ENTEROBACTER AER <SEE      9



 Sensitivi    Sensitivi  NOTE>      

 

 Anaerobic Culture  2010  Anaerobic Culture  NG5      10



 Bottle    Bottle        

 

 Blood Culture  2010  Aerobic Culture Bottle  NG5      11

 

 Ursc-1  2010  Ampicillin  >=32      









 Amikacin  <=2      

 

 Ciprofloxacin  <=0.25      

 

 Ceftriaxone  8      

 

 Cefazolin  >=64      

 

 Nitrofurantoin  64      

 

 Gentamicin  <=1      

 

 Imipenem  2      

 

 Levofloxacin  <=0.12      

 

 Trimeth-Sulfa  <=20      

 

 Ceftazidime  16      

 

 Tigecycline  <=0.5      

 

 Piperacillin/Tazobactam  8      









 Surgical Pathology  2010  Surgical Pathology  ---------------- <SEE      
12



       NOTE>      

 

 Laboratory test  2010  Release Date  8/7/10      13, 14



 finding            

 

 Type And Screen  2010  Patient Blood Type  O POSITIVE      13









 Antibody Screen  NEGATIVE      13

 

 Specimen Discard Date  8/12/10      13, 15









 Basic Metabolic Panel  2010  Sodium  141 mmol/L    135-145  13









 Potassium  5.4 mmol/L  High  3.5-5.0  13

 

 Chloride  109 mmol/L    101-111  13

 

 Co2 (Carbon Dioxide)  22.0 mmol/L    22-32  13

 

 Anion Gap  10.0 mmol/L    2-11  13, 16

 

 Glucose  54 mg/dL  Low    13, 17

 

 BUN  49 mg/dL  High  6-24  13

 

 Creatinine  2.23 mg/dL  High  0.50-1.40  13

 

 One Over Creatinine  0.40      13

 

 BUN/Creatinine Ratio  22.0  High  8-20  13

 

 Calcium  10.2 mg/dL  High  8.1-9.9  13, 18

 

 eGFR Non-  23.4    > 60  13

 

 eGFR   28.3    > 60  13, 19









 Laboratory test finding  2010  PTT (Aptt)  29.4    25.15-38.53  13, 20

 

 Protime  2010  Inr  1.05  High  0.97-1.03  13, 21









 Protime  12.4 SEC  High  11.5-12.2  13, 22









 CBC With Electronic Diff  2010  White Blood Count  9.1 CUMM    4.8-10.8  
13









 Red Cell Count  2.77 CUMM  Low  4.2-5.4  13

 

 Hemoglobin  8.6 g/dL  Low  12.0-16.0  13

 

 Hematocrit  26 %  Low  35-47  13

 

 Mean Corpuscular Volume  94 um3    79-97  13

 

 Mean Corpuscular Hemoglob  31 pg    27-31  13

 

 Mean Corpuscular HGB [facility]  33 g/dL    32-36  13

 

 Redcell Distribution WDTH  16 %  High  10.5-15  13

 

 Platelet Count  356 CUMM    150-450  13

 

 Mean Platelet Volume  7.1 um3  Low  7.4-10.4  13

 

 Gran %  69.2 %    38-83  13

 

 Lymph %  19.2 %  Low  25-47  13

 

 Mononuclear %  8.0 %    1-9  13

 

 Eosinophil %  3.2 %    0-6  13

 

 Basophil %  0.4 %    0-2  13

 

 Abs Lymphs  1.7    1.0-4.8  13

 

 Abs Mononuclear  0.7    0-0.8  13

 

 Absolute Neutrophil Count  6.3    1.5-7.7  13

 

 Abs Eosinophils  0.3    0-0.6  13

 

 Abs Basophils  0    0-0.2  13, 23









 1  : [id number] JOSE DUKE

 

 2  : [id number] LAPOINT GREYSON

 

 3  *******Because ethnic data is not always readily available,



   this report includes an eGFR for both -Americans and



   non- Americans.****



   The National Kidney Disease Education Program (NKDEP) does



   not endorse the use of the MDRD equation for patients that



   are not between the ages of 18 and 70, are pregnant, have



   extremes of body size, muscle mass, or nutritional status,



   or are non- or non-.



   According to the National Kidney Foundation, irrespective of



   diagnosis, the stage of the disease is based on the level of



   kidney function:



   Stage Description                      GFR(mL/min/1.73 m(2))



   1     Kidney damage with normal or decreased GFR       90



   2     Kidney damage with mild decrease in GFR          60-89



   3     Moderate decrease in GFR                         30-59



   4     Severe decrease in GFR                           15-29



   5     Kidney failure                       <15 (or dialysis)

 

 4  Anion gap measurement may be of limited value in the



   presence of any alkalosis, especially in a combined acid



   base disorder.



   .

 

 5  ** Note change in reference range as of 08.  The



   change was based on recommendations from the American



   Diabetes Association.

 

 6  Please note change in reference range effective 08



   .

 

 7  A metabolite of Naproxen, O-desmethylnaproxen, has been



   shown to interfere with the Jendrassik-Rosa method for



   measuring total bilirubin.  Samples from patients who have



   taken Naproxen have shown spurious elevation in total



   bilirubin levels.

 

 8  *******Because ethnic data is not always readily available,



   this report includes an eGFR for both -Americans and



   non- Americans.****



   The National Kidney Disease Education Program (NKDEP) does



   not endorse the use of the MDRD equation for patients that



   are not between the ages of 18 and 70, are pregnant, have



   extremes of body size, muscle mass, or nutritional status,



   or are non- or non-.



   According to the National Kidney Foundation, irrespective of



   diagnosis, the stage of the disease is based on the level of



   kidney function:



   Stage Description                      GFR(mL/min/1.73 m(2))



   1     Kidney damage with normal or decreased GFR       90



   2     Kidney damage with mild decrease in GFR          60-89



   3     Moderate decrease in GFR                         30-59



   4     Severe decrease in GFR                           15-29



   5     Kidney failure                       <15 (or dialysis)

 

 9  ENTEROBACTER AEROGENES



   100^,000 ORGANISMS/ML (MANY)^CCU



   NORMAL CONCHA



   25^10-25,000 ORGANISMS/ML (MODERATE)^CCU

 

 10  NO GROWTH AFTER 5 DAYS

 

 11  NO GROWTH AFTER 5 DAYS

 

 12  ---------------------------------------------------------------------------
----



   -------------



   



   RUN DATE: 08/12/10               [facility] NMI **LIVE**



   PAGE 1



   RUN TIME: 1215                            Specimen Inquiry



   RUN USER: INTERFACE



   -----------------------------------------------------------------------------
--



   -------------



   



   [facility] MONIQUE PLASCENCIA                   [id number]      Status: DIS IN



   Re/05/10



   Age/Sex: 65/F                         Unit#: [id number]       Location: [facility]. : 43



   -----------------------------------------------------------------------------
--



   -------------



   



   



   Specimen: [id number]    [facility]   Spec Date: 08/05/10        Subm Dr: Herbie navarro MD



   Spec Type: SURGICAL P          Received:  08/05/   Copies to:



   



   



   SPECIMEN



   



   L3 TO L5 LAMINA AND SPINUS PROCESS AND LIGAMENT



   



   HISTORY



   



   PRE-OP DIAGNOSIS:    L3/L5 Lumbar stenosis.



   



   



   



   GROSS DESCRIPTION



   



   The specimen is received in formalin labelled Monique Plascencia, L3 to L5



   Lamina and Spinous Process and Ligament, and consists of multiple



   fragments of bone and dense fibrous tissue measuring in aggregate



   5.0 x 4.0 x 2.5 cm. Representative section, one cassette after decal.



   



   ******DIAGNOSIS******



   



   L3-5 lamina and spinous process and ligament, laminectomy:



   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



   changes.



   B)   Normocellular bone marrow with mixed trilineal hematopoiesis.



   C)   Ligament tissue with dense fibrosis and myxoid degeneration.



   



   Signed Electronically by: FLAVIO LEVY MD 08/12/10 1214



   



   -----------------------------------------------------------------------------
--



   -------------



   



   



   



   



   



   



   



   



   



   



   



   



   



   



   



   



   -----------------------------------------------------------------------------
--



   -------------



   



   DEPARTMENT OF PATHOLOGY, [address] [place] [address]



   Phone #[phone]   Fax #[phone]   [facility] Permit #[id number]



   010



   Flavio Levy M.D. Director   Ayo Mckeon M.D. Assistant Dir



   veena



   -----------------------------------------------------------------------------
--



   -------------

 

 

 

 14  ANY BLOOD NOT GIVEN WILL BE RELEASED AT 0700 ON THE



   ABOVE DATE UNLESS DOCTOR NOTIFIES LAB OTHERWISE.

 

 15  PREADMISSION TESTING SAMPLES FOR BLOOD BANK WILL BE HELD FOR



   14 DAYS FROM THE DATE OF COLLECTION *IF* THE FOLLOWING



   CRITERIA ARE MET:



   



   1) THE PATIENT HAS *NOT* BEEN PREGNANT IN THE LAST 3 MONTHS.



   2) THE PATIENT HAS *NOT* BEEN TRANSFUSED IN THE LAST 3



   MONTHS.



   ============================================================



   PREADMISSION TESTING SAMPLES WILL *NOT* BE HELD FOR 14 DAYS



   FROM PATIENTS WHO IN THE LAST 3 MONTHS:



   



   1) HAVE BEEN PREGNANT



   2) HAVE BEEN TRANSFUSED



   



   THESE PATIENTS *MUST* BE COLLECTED WITHIN 3 DAYS OF THE



   SURGERY DATE.

 

 16  Anion gap measurement may be of limited value in the



   presence of any alkalosis, especially in a combined acid



   base disorder.



   .

 

 17  ** Note change in reference range as of 08.  The



   change was based on recommendations from the American



   Diabetes Association.

 

 18  Please note change in reference range effective 08



   .

 

 19  *******Because ethnic data is not always readily available,



   this report includes an eGFR for both -Americans and



   non- Americans.****



   The National Kidney Disease Education Program (NKDEP) does



   not endorse the use of the MDRD equation for patients that



   are not between the ages of 18 and 70, are pregnant, have



   extremes of body size, muscle mass, or nutritional status,



   or are non- or non-.



   According to the National Kidney Foundation, irrespective of



   diagnosis, the stage of the disease is based on the level of



   kidney function:



   Stage Description                      GFR(mL/min/1.73 m(2))



   1     Kidney damage with normal or decreased GFR       90



   2     Kidney damage with mild decrease in GFR          60-89



   3     Moderate decrease in GFR                         30-59



   4     Severe decrease in GFR                           15-29



   5     Kidney failure                       <15 (or dialysis)

 

 20  PLEASE NOTE NEW REFERENCE RANGE EFFECTIVE 09.

 

 21  Recommended INR for Patients



   on Oral Anticoagulants



   Prophylaxis                       2.0 - 3.0



   Treatment of thrombosis           2.0 - 3.0



   Prevention of embolism            2.0 - 3.0



   Prevention of embolism from



   prosthetic heart valves         2.5 - 3.5

 

 22  DIAGNOSIS,TREATMENT,AND THERAPY MUST BE BASED ON THE INR



   VALUE ALONE.

 

 23  Lymphopenia %



   Anemia







Procedures







 Date  CPT Code  Description  Status

 

 201880  Removal Implant Deep Wire,Screw Nail,Stevie Or Plate  Completed

 

 201880  Removal Implant Deep Wire,Screw Nail,Stevie Or Plate  Completed

 

 01/10/2017  25592  Walking Cast  Completed

 

 2016  04969  Walking Cast  Completed

 

 12/10/2016  08985  EKG, Interpretation Only  Completed

 

 2016  63202  Walking Cast  Completed

 

 2016  61867  Walking Cast  Completed

 

 11/15/2016  79857  Walking Cast  Completed

 

 2016  70221  Walking Cast  Completed

 

 10/25/2016  86783  Walking Cast  Completed

 

 10/20/2016  58529  Walking Cast  Completed

 

 10/13/2016  60613  Short Leg Cast  Completed

 

 10/06/2016  37725  Short Leg Cast  Completed

 

 2016  11532  ORIF Open TX Bimalleolar Ankle FX Includes Internal  
Completed



     Fixation  

 

 2016  83676  ORIF Open TX Bimalleolar Ankle FX Includes Internal  
Completed



     Fixation  

 

 2016  82327  EEG Recording Awake & Asleep  Completed

 

 2016  97730  EEG Recording Awake & Drowsy  Completed

 

 2016  66992  EKG, Interpretation Only  Completed

 

 2016  41337  ECHO Transthorasic Realtime 2D W Doppler & Color Flow  
Completed



     Hosp  

 

 2014  22608  EKG Tracing & Interpretation  Completed

 

 11/15/2013  32594  Stress Test  Completed

 

 11/15/2013  73462  Myocardial Perfusion Imaging Tomographic (Spect)  Completed



     Multiple Studies  

 

 2013  43783  EKG Tracing & Interpretation  Completed

 

 2013  03012  EKG, Interpretation Only  Completed

 

 2013  60084  ECHO Transthorasic Realtime 2D W Doppler & Color Flow  
Completed



     Hosp  

 

 2010  69018  Díaz/Facet/Foraminotomy;Ea Addl Segment; Cerv, Thora, Or  
Completed



     Lumbar  

 

 2010  84714  Díaz/Facet/Foraminotomy;Vertebral Segment; Lumbar  Completed







Encounters







 Type  Date  Location  Provider  CPT E/M  Dx

 

 Office Visit  05/10/2018  8:15a  [facility]  Maryam Dodson D.O.  60926  
I69.320









 I48.0

 

 T81.4xxS

 

 E11.9









 Office Visit  2018 10:18a  [facility]  Kaci Ohara,  11166  
S91.002A



     [facility]  M.D.    









 I10

 

 E11.610

 

 Z78.9









 Office Visit  2018 10:17a  [facility]  Kaci Ohara,  04018  
S91.002A



     [facility]  MCY    









 I10

 

 E11.610

 

 Z78.9









 Office Visit  2018 10:13a  [facility]  Kaci Ohara,  72520  
S91.002A



     [facility]  M.D.    









 I10

 

 E11.610

 

 Z78.9









 Office Visit  2018 10:12a  [facility]  Iris Spivey,  60130  
S91.002A



     CHRISTINE MEDINA    









 E11.610









 Office Visit  2018 10:12a  [facility]  Kaci Ohara,  70457  
S91.002A



     [facility]  M.D.    









 I10

 

 E11.610

 

 Z78.9









 Office Visit  2018 10:00a  [facility]  Naren Khan,  
54804  M14.622



     CHRISTINE MEDINA    

 

 Office Visit  2018  9:30a  [facility]  Swathi Mallory,  24168  N39.0



       NP    









 R31.0









 Office Visit  2018  9:30a  [facility]  Allison Alex MD  26570  
I69.320









 M25.572

 

 I48.0

 

 M25.512

 

 E11.9

 

 I10









 Office Visit  2018  8:15a  [facility]  Linda Rudd, ANTWON  80545  
M25.572

 

 Office Visit  2018  9:15a  [facility]  Naren Khan,  04983  
M19.172



     Of CHRISTINE MEDINA    

 

 Office Visit  2017  8:45a  [facility]  Swathi Parksmisha, ANTWON  52071  
M25.512

 

 Office Visit  2017  8:15a  [facility]  Swathi ParksANTWON cabral  00765  
I63.10









 I48.0

 

 Z51.81

 

 Z79.01









 Office Visit  2017  8:30a  [facility]  Linda Rudd NP  21193  
S70.312A









 S70.311A









 Office Visit  2017 10:00a  [facility]  Shaka Velasquez MD  87974  
I63.40









 S82.842G

 

 R47.01

 

 Z99.3









 Office Visit  2017  8:15a  [facility]  Shaka Velasquez MD  36829  I48.2









 Z79.01

 

 I10

 

 E03.9

 

 Z79.4

 

 E11.9

 

 E78.5

 

 Z99.3









 Office Visit  2017  8:00a  [facility]  Linda Rudd, ANTWON  30207  R05

 

 Office Visit  10/17/2017  8:45a  [facility]  Naren Khan,  03849  
S82.842D



     Of CHRISTINE MEDINA    









 M14.672









 Office Visit  10/04/2017  8:00a  [facility]  Linda Rudd NP  56191  
M25.572

 

 Office Visit  2017  8:00a  [facility]  Kaci Ohara M.D.  38270  
I63.10









 I48.0

 

 Z51.81

 

 Z79.01









 Office Visit  2017 10:00a  [facility]  Kaci Ohara M.D.  95504  
I63.10









 I48.0

 

 Z51.81

 

 Z79.01









 Office Visit  2017 10:15a  [facility]  Naren Khan,  
50535  M14.672



     CHRISTINE MEDINA    









 M25.372









 Office Visit  2017 10:30a  [facility]  Naren Bill  20276  
S82.842D



     Of CHRISTINE MEDINA    

 

 Office Visit  2017 10:00a  [facility]  Naren Khan  94800  
S82.842D



     Of CTALHA MEDINA    

 

 Office Visit  2017  9:30a  [facility]  Naren Khan  54185  
S82.842D



     Of CHRISTINE MEDINA    

 

 Office Visit  01/10/2017  9:30a  [facility]  Naren Khan  31640  
S82.842D



     Of CHRISTINE MEDINA    

 

 Office Visit  2016  9:15a  [facility]  Naren Khan  53435  
S82.842D



     Of CHRISTINE MEDINA    









 S82.842D









 Office Visit  12/15/2016 12:55p  [facility],  Stephanie Varela,  
40592  K92.2



     Hospitalists  M.DLaisha    









 I48.91

 

 E11.8

 

 I10









 Office Visit  2016 12:55p  [facility],  Stephanie Varela,  
65874  K92.2



     Hospitalists  M.D.    









 I48.91

 

 E11.8

 

 I10









 Office Visit  2016 12:55p  [facility],  Stephanie Varela,  
66151  K92.2



     Hospitalists  M.D.    









 I48.91

 

 E11.8

 

 I10









 Office Visit  2016 12:54p  [facility],  Stephanie Varela,  
85990  K92.2



     Hospitalists  M.D.    









 I48.91

 

 E11.8









 Office Visit  2016 12:54p  [facility],  Kaci Ohara,  
49223  K92.2



     Hospitalists  M.DLaisha    









 I48.91

 

 E11.8

 

 I10









 Office Visit  12/10/2016 12:53p  [facility]  Kaci Ohara,  
09651  K92.2



     Hospitalists  M.D.    









 I48.91

 

 E11.8

 

 I10









 [facility]  2016 12:53p  [facility]  Chuy Cleveland,  
46375  K92.2



     Hospitalists  N.P.    









 I48.91

 

 E11.8

 

 I10









 Office Visit  2016  9:15a  [facility]  Naren Khan,  86167  
S82.842D



     Of CHRISTINE MEDINA    









 S82.842D









 [facility]  2016  2:32p  [facility]  [address]234
  E11.9



     Assoc,pc  PA    



     Hospitalists      









 Z96.7

 

 Z78.9

 

 Z87.81









 Office [facility]  2016  2:31p  [facility]  02642
  E11.9



     Assoc,pc  PA    



     Hospitalists      









 Z96.7

 

 Z78.9

 

 Z87.81









 Office [facility]  2016  2:28p  [facility]  [address]69
  E11.9



     Assoc,pc  PA    



     Hospitalists      









 Z96.7

 

 Z78.9

 

 Z87.81









 Office Visit  2016  3:50p  [facility]  Iris Patric,  92698  
S82.842A



     CHRISTINE MEDINA    

 

 Office Visit  2016  4:13p  [facility]  Naren Khan,  
14340  S82.852A



     CHRISTINE MEDINA    

 

 Office Visit  2016  9:15a  [facility]  Angeles Ken MD  
95403  I69.320









 I10

 

 S82.842A

 

 Z79.01









 Office Visit  2016  7:00a  [facility]  Iris Spivey,  06275  
S82.842A



     CHRISTINE MEDINA    

 

 [facility]  2016  1:49p  [facility]  Chuy Cleveland,  
44987  R47.01



     Hospitalists  N.P.    









 I48.2

 

 N30.01

 

 I63.412









 Office Visit  2016  2:43p  [facility]  Fred Frankenberg ,  18237  
R47.01



     Assoc, Hospitalists  M.D.    









 I48.2

 

 N30.01

 

 I63.412









 Office Visit  2016 12:44p  [facility],  Don Romero,  
04251  I63.9



     Hospitalists  M.D.    









 E11.9

 

 E66.01

 

 Z79.4









 Office Visit  08/15/2016 12:43p  [facility],  Don Romero,  
62401  I63.9



     Hospitalists  M.D.    









 E11.9

 

 E66.01

 

 Z79.4









 Office Visit  2016 11:33a  [facility]  Francis HANNAH  90594  
I63.512



       CAROL Rosario    









 I65.29

 

 I10

 

 I48.91









 Office [facility]  2016 12:43p  [facility]  Kaci Ohara,  
53137  I63.9



     Hospitalists  M.DLaisha    









 E11.9

 

 E66.01

 

 Z79.4









 Office Visit  2016 12:12p  [facility]  Francis HANNAH  50817  
I63.512



       CAROL Rosario    









 I48.91

 

 I65.29

 

 I10









 Office Visit  2016 12:42p  [facility]  Kaci Ohara,  
11296  I63.9



     Hospitalists  M.DLaisha    









 E11.9

 

 E66.01

 

 Z79.4









 Office Visit  2016 12:09p  [facility]  Nelia Aden,  
28281  I63.512



       M.D.    









 R25.1

 

 R40.0

 

 I10

 

 I48.91









 Office Visit  2016 12:42p  [facility],  Shaka Velasquez MD  
96595  I63.9



     Hospitalists      









 E11.9

 

 E66.01

 

 Z79.4









 Office Visit  2016 12:41p  [facility],  Shaka Velasquez MD  
35016  I63.9



     Hospitalists      









 E11.9

 

 E66.01

 

 Z79.4









 Office Visit  2016 12:06p  [facility]  Nelia Aden,  
23269  I63.512



       M.D.    









 I10

 

 I48.91

 

 R25.1

 

 R40.0









 Office Visit  08/10/2016  1:56p  [facility]  Nelia Aden  
96706  I63.512



       M.D.    









 I10

 

 I48.91

 

 I65.29









 Office Visit  08/10/2016 12:41p  [facility],  Shaka Velasquez MD  
61445  I63.9



     Hospitalists      









 E11.9

 

 E66.01

 

 Z79.4









 Office Visit  2016 12:40p  [facility],  Shaka Velasquez MD  
74406  I63.9



     Hospitalists      









 E11.9

 

 E66.01

 

 Z79.4









 Office Visit  2016  9:22a  [facility]  Greg Dickens,  
47769  I63.512



       MD    









 I10

 

 I48.91









 Office Visit  2016 12:39p  [facility],  Sedrick Lara M.D.  
79732  G45.9



     Hospitalists      









 E11.9

 

 E66.01

 

 Z79.4









 Office Visit  2014  9:30a  [facility] MARLENA Mayes,  
13138  427.31



     Anthony  M.DLaisha    

 

 Office Visit  2013  1:00p  [facility] MARLENA Mayes,  
07925  427.31



     [facility]  [place].MARLENA    









 786.50

 

 V72.81

 

 794.39









 Office Visit  2013  3:51p  [facility],  Sedrick Lara M.D.  
45809  578.1



     Hospitalists      









 280.0









 Office Visit  2013  3:50p  [facility],  Sedrick Lara M.D.  
85519  578.1



     Hospitalists      









 280.0









 Office Visit  2013  3:50p  [facility],  Stephanie Varela,  
36364  578.1



     Hospitalists  MLaishaDLaisha    









 280.0









 Office Visit  2013  3:50p  [facility],  Stephanie Varela,  
96250  578.1



     Hospitalists  M.D.    









 280.0









 Office Visit  2013  3:36p  [facility],  Stephanie Varela,  
29368  285.1



     Hospitalists  M.D.    









 578.9

 

 411.89









 Office Visit  2013  3:36p  [facility],  Stephanie Varela,  
37696  285.1



     Hospitalists  M.D.    









 578.9

 

 411.89









 Office Visit  2013  3:36p  [facility],  Stephanie Varela,  
67859  285.1



     Hospitalists  M.DLaisha    









 578.9

 

 411.89









 Office Visit  2013  4:07p  [facility]  Keith Mustafa M.D.,  
76906  794.31



     [facility]    









 411.1

 

 424.1









 Office Visit  2013  3:35p  [facility],  Stephanie Varela,  
08555  285.1



     Hospitalists  [facility]    









 578.9

 

 411.89









 Office Visit  2013  3:35p  [facility],  Stephanie Varela,  
00290  285.1



     Hospitalists  M.MARLENA    









 578.9

 

 794.31









 Office Visit  2013  1:20p  [facility]  Herbie Marcano,  
24289  724.02



     Of Cma  M.D.    

 

 Office Visit  2012  1:20p  [facility]  Herbie Marcano,  
71365  724.02



     Of Cma  M.D.    

 

 Office Visit  2011  3:00p  [facility]  Herbie Marcano,  
25040  724.02



     Of Cma  M.D.    

 

 Office Visit  2011 11:00a  [facility]  Herbie Marcano  
10832  724.02



     Of Cma  M.D.    

 

 Office Visit  2011 11:40a  [facility]  Herbie Marcano,  
01114  724.02



     Of Cma  M.D.    

 

 Office Visit  2011  9:40a  [facility]  Herbie Marcano  
54083  724.02



     Of Cma  M.D.    

 

 Office Visit  2010  3:40p  [facility]  Herbie Marcano,  
50980  724.02



     Of Cma  M.D.    

 

 Office Visit  2010 10:40a  [facility]  Herbie Marcano  
59654  724.02



     Of Cma  M.D.    

 

 Office Visit  06/15/2010  1:40p  [facility]  Herbie Marcano,  
68780  781.2



     Of Cma  M.D.    

 

 Office Visit  2010  9:20a  [facility]  Herbie Marcano  
94102  781.2



     Of Cma  M.D.    

 

 Office Visit  2010  1:40p  [facility]  Herbie Marcano,  
32583  781.2



     Of Anthony MEDINA    







Plan of Care

Future Appointment(s):2018  9:45 am - [facility]2018  9:30 am - [facility]2018 - Naren Khan M.D.S91.002D Unspecified open 
wound, left ankle, subsequent encounterFollow up:3-4 gceqiS84.610 Type 2 
diabetes mellitus w diabetic neuropathic arthropathy

## 2018-07-04 NOTE — ED
Etelvina ENGEL Simon, scribed for Anne Ortiz MD on 07/04/18 at 2016 .





Lower Extremity





- HPI Summary


HPI Summary: 





This patient is a 73 year old F presenting to [facility] from [facility] with 

a chief complaint of intermittent left ankle/leg pain at 3/10 intensity for 1 

week. Pt endorses difficulty with left leg ROM, and mild aphasia from CVA 1 

year ago. PMHx ankle fx, cast off 2 weeks ago to remove screws, then replaced, 

currently on now. Pt endorses taking oxycodone and one other pain medication 

for the fx. She denies fevers, tachycardia. PMHx DM, CVA 1 year ago. She 

endorses an allergy to Dilaudid.





- History of Current Complaint


Stated Complaint: LT LEG PAIN


Hx Obtained From: Patient, EMS


Mechanism Of Injury: Unknown


Onset of Pain: Days, Prior to Arrival


Onset/Duration: Days


Severity Initially: Moderate


Severity Currently: Moderate


Pain Intensity: 3


Pain Scale Used: 0-10 Numeric


Timing: Constant


Location: Is Discrete @ - left lateral ankle, medial left calf.


Associated Signs And Symptoms: Positive: Knee Pain - bilateral, Other - Cast 

makes identification of many sx difficult..  Negative: Fever


Aggravating Factor(s): Nothing


Alleviating Factor(s): Other - prescribed oxycodone


Able to Bear Weight: No





- Allergies/Home Medications


Allergies/Adverse Reactions: 


 Allergies











Allergy/AdvReac Type Severity Reaction Status Date / Time


 


hydromorphone Allergy  Altered Verified 07/04/18 20:15





   Mental  





   Status  


 


rivaroxaban [From Xarelto] Allergy  Bleeding Verified 07/04/18 20:15











Home Medications: 


 Home Medications





Acetaminophen [APAP] 325 mg PO Q6H PRN 07/04/18 [History Confirmed 07/04/18]











PMH/Surg Hx/FS Hx/Imm Hx


Endocrine/Hematology History: Reports: Hx Diabetes - IDDM, Hx Thyroid Disease - 

hypothyroid, Hx Anemia


Cardiovascular History: Reports: Hx Atrial Fibrillation, Hx Coronary Artery 

Disease, Hx Hypercholesterolemia, Hx Hypertension, Other Cardiovascular Problems

/Disorders - A Fib, myocardial stenosis


   Denies: Hx Pacemaker/ICD


Respiratory History: Reports: Hx Asthma, Hx Sleep Apnea


GI History: Reports: Hx Gastroesophageal Reflux Disease, Hx Gastrointestinal 

Bleed, Other GI Disorders - GI BLEED


 History: Reports: Hx Kidney Infection


   Denies: Hx Dialysis, Hx Renal Disease


Musculoskeletal History: Reports: Hx Arthritis, Other Musculoskeletal History - 

CHRONIC BACK PAIN, bilat knee replacements, recent L ankle fracture


Sensory History: Reports: Hx Cataracts - Implant surgery L eye 2008, Hx 

Contacts or Glasses


   Denies: Hx Hearing Aid, Hx Hearing Problem


Opthamlomology History: Reports: Hx Cataracts - Implant surgery L eye 2008, Hx 

Contacts or Glasses


Neurological History: Reports: Hx CVA - with right-sided sequela, Hx Transient 

Ischemic Attacks (TIA) - "Mini-strokes" 20 yrs ago


   Denies: Hx Dementia, Hx Developmental Delay, Hx Headaches, Hx Migraine, Hx 

Nerve Disease, Hx Seizures, Hx Spinal Cord Injury, Other Neuro Impairments/

Disorders


Psychiatric History: Reports: Hx Anxiety, Hx Depression, Other Psychiatric 

Issues/Disorders - claustrophobia


   Denies: Hx Panic Disorder





- Cancer History


Hx Chemotherapy: No


Hx Radiation Therapy: No





- Surgical History


Surgery Procedure, Year, and Place: R knee replacement 2011, L3/L4 laminectomy.

  left knee replacement 2013.  L ankle ORIF 8/2/16


Hx Anesthesia Reactions: No





- Immunization History


Date of Tetanus Vaccine: 2011


Date of Influenza Vaccine: Fall 2012


Infectious Disease History: Reports: Hx Shingles - pt states about 6 months ago

, ON HER BACK


   Denies: Hx Clostridium Difficile, Hx Hepatitis, Hx Human Immunodeficiency 

Virus (HIV), Hx of Known/Suspected MRSA, Hx Tuberculosis, History Other 

Infectious Disease





- Family History


Known Family History: Positive: Hypertension, Other - lung cancer (father), 

brain cancer (brother)


   Negative: Cardiac Disease, Diabetes





- Social History


Occupation: Retired


Lives: At [facility]


Alcohol Use: None


Substance Use Type: Reports: None


Smoking Status (MU): Former Smoker


Type: Cigarettes


Have You Smoked in the Last Year: No





Review of Systems


Negative: Fever


Positive: Arthralgia - knees bilaterally, left lateral ankle, Myalgia - left 

medial calf, Decreased ROM


Neurological: Other - Aphasia s/p CVA 1 year ago


Positive: Numbness - bilateral peripheral neuropathy


All Other Systems Reviewed And Are Negative: Yes





Physical Exam





- Summary


Physical Exam Summary: 





Appearance: Chronically ill-appearing, minimal pain distress, obese, dyphasic


Skin: Warm, color reflects adequate perfusion, dry


Head: Normal Head/Face inspection, atraumatic


Eyes: Conjunctiva clear


ENT: Normal inspection


Neck: Supple, no nodes, no JVD


Respiratory: Lungs clear, normal breath sounds, no respiratory distress


Cardio: RRR, No murmur, pulses normal, brisk capillary refill


Abdomen: Soft, nontender


Bowel sounds: Present 


Musculoskeletal: Strength Intact/ROM intact, no calf tenderness RLE, no calf 

tenderness in cast leg by palpating with fingers in the cast, no edema RLE, no 

swelling of toes in cast leg, cast from toes to knee present LLE, pt indicates 

pain at lateral malleolus, cast is not warm or deformed, no odor from cast, 

able to place 1 finger along top and lower rims of cast, capillary refill <2 

seconds, sensation not intact bilaterally (DM neuropathy).


Psychological: Normal


Neuro: Alert, muscle tone normal, no focal deficit


Triage Information Reviewed: Yes


Vital Signs On Initial Exam: 


 Initial Vitals











Resp


 


 28 


 


 07/04/18 20:12











Vital Signs Reviewed: Yes





Diagnostics





- Vital Signs


 Vital Signs











  Temp Pulse Resp BP Pulse Ox


 


 07/04/18 21:41  98.9 F  52  16  149/66  95


 


 07/04/18 21:14    8  149/66 


 


 07/04/18 21:00   47  14   97


 


 07/04/18 20:50   47  12  208/81  96


 


 07/04/18 20:43   48  17  208/102  97


 


 07/04/18 20:16  99 F  51  18  206/89  96


 


 07/04/18 20:15    12  206/89 


 


 07/04/18 20:12    28  














- Laboratory


Lab Results: 


 Lab Results











  07/04/18 07/04/18 07/04/18 Range/Units





  20:39 20:39 20:39 


 


WBC  8.6    (3.5-10.8)  10^3/ul


 


RBC  3.99 L    (4.00-5.40)  10^6/ul


 


Hgb  12.2    (12.0-16.0)  g/dl


 


Hct  37    (35-47)  %


 


MCV  91    (80-97)  fL


 


MCH  31    (27-31)  pg


 


MCHC  33    (31-36)  g/dl


 


RDW  14    (10.5-15)  %


 


Plt Count  190    (150-450)  10^3/ul


 


MPV  8.9    (7.4-10.4)  um3


 


Neut % (Auto)  66.2    (38-83)  %


 


Lymph % (Auto)  18.2 L    (25-47)  %


 


Mono % (Auto)  8.0 H    (0-7)  %


 


Eos % (Auto)  6.8 H    (0-6)  %


 


Baso % (Auto)  0.8    (0-2)  %


 


Absolute Neuts (auto)  5.7    (1.5-7.7)  10^3/ul


 


Absolute Lymphs (auto)  1.6    (1.0-4.8)  10^3/ul


 


Absolute Monos (auto)  0.7    (0-0.8)  10^3/ul


 


Absolute Eos (auto)  0.6    (0-0.6)  10^3/ul


 


Absolute Basos (auto)  0.1    (0-0.2)  10^3/ul


 


Absolute Nucleated RBC  0    10^3/ul


 


Nucleated RBC %  0    


 


ESR  71 H    (0-40)  mm/Hr


 


INR (Anticoag Therapy)   1.83 H   (0.77-1.02)  


 


Sodium     (135-145)  mmol/L


 


Potassium     (3.5-5.0)  mmol/L


 


Chloride     (101-111)  mmol/L


 


Carbon Dioxide     (22-32)  mmol/L


 


Anion Gap     (2-11)  mmol/L


 


BUN     (6-24)  mg/dL


 


Creatinine     (0.51-0.95)  mg/dL


 


Est GFR ( Amer)     (>60)  


 


Est GFR (Non-Af Amer)     (>60)  


 


BUN/Creatinine Ratio     (8-20)  


 


Glucose     ()  mg/dL


 


Lactic Acid    0.6  (0.5-2.0)  mmol/L


 


Calcium     (8.6-10.3)  mg/dL


 


Total Bilirubin     (0.2-1.0)  mg/dL


 


AST     (13-39)  U/L


 


ALT     (7-52)  U/L


 


Alkaline Phosphatase     ()  U/L


 


C-Reactive Protein     (<8.01)  mg/L


 


Total Protein     (6.4-8.9)  g/dL


 


Albumin     (3.2-5.2)  g/dL


 


Globulin     (2-4)  g/dL


 


Albumin/Globulin Ratio     (1-3)  














  07/04/18 Range/Units





  20:40 


 


WBC   (3.5-10.8)  10^3/ul


 


RBC   (4.00-5.40)  10^6/ul


 


Hgb   (12.0-16.0)  g/dl


 


Hct   (35-47)  %


 


MCV   (80-97)  fL


 


MCH   (27-31)  pg


 


MCHC   (31-36)  g/dl


 


RDW   (10.5-15)  %


 


Plt Count   (150-450)  10^3/ul


 


MPV   (7.4-10.4)  um3


 


Neut % (Auto)   (38-83)  %


 


Lymph % (Auto)   (25-47)  %


 


Mono % (Auto)   (0-7)  %


 


Eos % (Auto)   (0-6)  %


 


Baso % (Auto)   (0-2)  %


 


Absolute Neuts (auto)   (1.5-7.7)  10^3/ul


 


Absolute Lymphs (auto)   (1.0-4.8)  10^3/ul


 


Absolute Monos (auto)   (0-0.8)  10^3/ul


 


Absolute Eos (auto)   (0-0.6)  10^3/ul


 


Absolute Basos (auto)   (0-0.2)  10^3/ul


 


Absolute Nucleated RBC   10^3/ul


 


Nucleated RBC %   


 


ESR   (0-40)  mm/Hr


 


INR (Anticoag Therapy)   (0.77-1.02)  


 


Sodium  139  (135-145)  mmol/L


 


Potassium  4.3  (3.5-5.0)  mmol/L


 


Chloride  106  (101-111)  mmol/L


 


Carbon Dioxide  25  (22-32)  mmol/L


 


Anion Gap  8  (2-11)  mmol/L


 


BUN  39 H  (6-24)  mg/dL


 


Creatinine  1.60 H  (0.51-0.95)  mg/dL


 


Est GFR ( Amer)  38.1  (>60)  


 


Est GFR (Non-Af Amer)  31.5  (>60)  


 


BUN/Creatinine Ratio  24.4 H  (8-20)  


 


Glucose  143 H  ()  mg/dL


 


Lactic Acid   (0.5-2.0)  mmol/L


 


Calcium  9.0  (8.6-10.3)  mg/dL


 


Total Bilirubin  0.30  (0.2-1.0)  mg/dL


 


AST  13  (13-39)  U/L


 


ALT  10  (7-52)  U/L


 


Alkaline Phosphatase  85  ()  U/L


 


C-Reactive Protein  3.29  (<8.01)  mg/L


 


Total Protein  7.2  (6.4-8.9)  g/dL


 


Albumin  3.6  (3.2-5.2)  g/dL


 


Globulin  3.6  (2-4)  g/dL


 


Albumin/Globulin Ratio  1.0  (1-3)  











Result Diagrams: 


 07/04/18 20:39





 07/04/18 20:40


Lab Statement: Any lab studies that have been ordered have been reviewed, and 

results considered in the medical decision making process.





Lower Extremity Course/Dx





- Course


Course Of Treatment: A 73-year-old F presents to the ED with a CC of LLE pain @3

/10 intensity for 1 week. (+) mild aphasia, left ankle cast, decreased ROM. (-) 

fever, tachycardia. PMHx CVA 1 year ago, DM, screws out of ankle 2 weeks ago, 

cast removed and replaced. In the ED course, pt was given clonidine. Allergies 

noted, high blood pressure noted. Pt medications reviewed this visit.





- Diagnoses


Provider Diagnoses: 


 Left leg pain, Cast in place on lower extremity, Poorly controlled blood 

pressure








- Physician Notifications


Discussed Care Of Patient With: Malaiak Corrigan - [phone]


Time Discussed With Above Provider: 20:20


Instructed by Provider To: Other - discussed pt being in the ED, whether or not 

to remove cast, obtained background about fracture and pt PMHx related to fx.





Discharge





- Sign-Out/Discharge


Documenting (check all that apply): Discharge/Admit/Transfer - home to [facility] 





- Discharge Plan


Condition: Stable


Disposition: HOME


Patient Education Materials:  Cast Care (ED)


Referrals: 


Naren Khan MD [Medical Doctor] - 1 Day


Alo Espinosa MD [Primary Care Provider] - 


Additional Instructions: 


We discussed your care tonight with Ms Corrigan, the PA who works with Dr. Khan.  She knows your cast history very well.  Your labs tonight do not show 

any sign of infection.  We have given you a copy of these labs to bring to Dr. Khan tomorrow.


Dr. Khan will definitely see you tomorrow in the office.  You will need to 

call him in the am, to find out the time.


Continue your oxycodone as directed.


Return to the ER if you have any new or worsening symptoms.











Consult


Consult: 





Dr. Corrigan [id number]: discussed PE of pt, findings, lab work





The documentation as recorded by the Etelvina greer Simon accurately 

reflects the service I personally performed and the decisions made by me, Anne Ortiz MD.

## 2018-07-04 NOTE — XMS REPORT
Monique Plascecnia

 Created on:2018



Patient:Monique Plascencia

Sex:Female

:1945

External Reference #:[id number]





Demographics







 Address  [address] [place] [address]

 

 Home Phone  [phone]

 

 Mobile Phone  [phone]

 

 Email Address  [email or web address]

 

 Preferred Language  English

 

 Marital Status  Not  Or 

 

 Gnosticism Affiliation  Unknown

 

 Race  White

 

 Ethnic Group  Not  Or 









Author







 Organization  [employer]

 

 Address  [address]



   [place] [address]

 

 Phone  [phone]









Support







 Name  Relationship  Address  Phone

 

 Agustina Xavier  Unavailable  Unavailable  [phone]

 

 Elva Plascencia  Unavailable  Unavailable  [phone]

 

 Matt Salmeron  Unavailable  Unavailable  [phone]

 

 Whitney Sparks  Unavailable  Unavailable  [phone]

 

 Suzie Jesi  Unavailable  Unavailable  [phone]









Care Team Providers







 Name  Role  Phone

 

 Patient's Choice  Primary Care Physician  Unavailable









Payers







 Type  Date  Identification Numbers  Payment Provider  Subscriber

 

 Medicare Primary  Effective:  Policy Number:  Medicare  Monique Plascencia



   1995  [id number]    









 PayID: [id number]  [address]









 [place] [address]









 [facility]    Policy Number: [id number]  Medicaid  Monique Plascencia









 Group Name: 1   [address]

 

 PayID: [id number]  [place] [address]









 Commercial  Expires: 2017  Policy Number: [id number]  Brimleylillian Plascencia









 PayID: [id number]  [address]









 [place] [address]







Problems







 Date  Description  Provider  Status

 

 Onset: 2013  Atrial fibrillation  Kaz Mayes M.D.  Active

 

 Onset: 2013  Chest pain  Kaz Mayes M.D.  Active

 

 Onset: 2013  Preoperative cardiovascular  Kaz Mayes M.D.  Active



   examination    

 

 Onset: 2013  Abnormal results of cardiovascular  Kaz Mayes M.D.  
Active



   function studies    

 

 Onset: 2018  Arthropathy associated with a  Naren Khan M.D.  Active



   neurological disorder    

 

 Onset: 2018  Teo hematuria  Swathi Mallory NP  Active

 

 Onset: 2018  Urinary tract infectious disease  Swathi Mallory NP  
Active

 

 Onset: 2018  Arthralgia of the ankle and/or  Allison Alex MD  
Active



   foot    

 

 Onset: 2018  Paroxysmal atrial fibrillation  Allison Alex MD  
Active

 

 Onset: 2018  Shoulder joint pain  Allison Alex MD  Active

 

 Onset: 2018  Aphasia  Allison Alex MD  Active

 

 Onset: 2018  Essential hypertension  Allison Alex MD  Active

 

 Onset: 2018  Type 2 diabetes mellitus  Allison Alex MD  Active

 

 Onset: 2018  Localized, secondary  Naren Khan M.D.  Active



   osteoarthritis of the ankle and/or    



   foot    

 

 Onset: 2018  Cerebral infarction due to  Swathi Mallory NP  Active



   embolism of precerebral arteries    

 

 Onset: 2018  Medication monitoring  Swathi Mallory NP  Active

 

 Onset: 2018  Long-term current use of  Swathi Mallory NP  Active



   anticoagulant    

 

 Onset: 2018  Abrasion and/or friction burn of  Linda Rudd, NP  
Active



   lower limb without infection    

 

 Onset: 2018  Abrasion and/or friction burn of  Linda Rudd, NP  
Active



   lower limb without infection    

 

 Onset: 2018  Cerebral infarction due to  Shaka Velasquez MD  Active



   embolism of cerebral arteries    

 

 Onset: 2018  Displ bimalleol fx l low leg, subs  Shaka Velasquez MD  
Active



   for clos fx w delay heal    

 

 Onset: 2018  Dependence on wheelchair  Shaka Velasquez MD  Active

 

 Onset: 2018  Chronic atrial fibrillation  Shaka Velasquez MD  Active

 

 Onset: 2018  Hypothyroidism  Shaka Velasquez MD  Active

 

 Onset: 2018  Long-term current use of insulin  Shaka Velasquez MD  Active

 

 Onset: 2018  Hyperlipidemia  Shaka Velasquez MD  Active

 

 Onset: 2018  Cough  Linda Rudd NP  Active

 

 Onset: 2018  Displ bimalleol fx l low leg, subs  Naren Khan M.D.  
Active



   for clos fx w routn heal    

 

 Onset: 2018  Charcot's joint of foot  Naren Khan M.D.  Active

 

 Onset: 2018  Joint derangement  Naren Khan M.D.  Active

 

 Onset: 2018  Gastrointestinal hemorrhage  Stephanie Varela M.D.  Active

 

 Onset: 2018  Diabetes mellitus  Stephanie Varela M.D.  Active

 

 Onset: 2018  Electrocardiogram abnormal  Ty Jean M.D.,  Active



     FACC, FASNC  

 

 Onset: 2018  Pressure ulcer of unspecified  Betty LondonTERRANCE cochran  Active



   heel, stage 3    

 

 Onset: 2018  Implantation of joint prosthesis  JAILENE Sky
  Active

 

 Onset: 2018  Other specified health status  JAILENE Sky  
Active

 

 Onset: 2018  H/O: fracture  JAILENE Sky  Active

 

 Onset: 2018  Encounter for other orthopedic  TERRANCE Snow  Active



   aftercare    

 

 Onset: 2018  Closed bimalleolar fracture  Betty TERRANCE Matias  Active

 

 Onset: 2018  Closed trimalleolar fracture  Naren Khan M.D.  Active

 

 Onset: 2018  Aphasia as late effect of  Angeles Ken MD  Active



   cerebrovascular accident    

 

 Onset: 2018  Acute hemorrhagic cystitis  Chuy Cleveland, N.P.  Active

 

 Onset: 2018  Cerebral infarction due to  Chuy Cleveland, N.P.  Active



   embolism of middle cerebral artery    

 

 Onset: 2018  Cerebral artery occlusion  Don Romero M.D.  Active

 

 Onset: 2018  Morbid obesity  Don Romero M.D.  Active

 

 Onset: 2018  Occlusion and stenosis of middle  Francis Rosario M.D.  
Active



   cerebral artery with infarction    

 

 Onset: 2018  Carotid artery occlusion  Francis Rosario M.D.  Active

 

 Onset: 2018  Abnormal involuntary movement  Nelia Aden M.D.  Active

 

 Onset: 2018  Disturbance of consciousness  Nelia Aden M.D.  Active

 

 Onset: 2018  Athscl heart disease of native  Keith Mustafa M.D., FACC,  
Active



   coronary artery w/o ang pctrs  FSCAI  

 

 Onset: 2018  Transient cerebral ischemia  Sedrick Lara M.D.  Active

 

 Onset: 2018  Electrocardiogram abnormal  Kaz Mayes M.D.  Active

 

 Onset: 2018  Pre-surgery evaluation  Kaz Mayes M.D.  Active

 

 Onset: 2018  Melena  Sedrick Lara M.D.  Active

 

 Onset: 2018  Anemia due to chronic blood loss  Sedrick Lara M.D.  Active

 

 Onset: 2018  Acute posthemorrhagic anemia  Stephanie Varela M.D.  Active

 

 Onset: 2018  Gastrointestinal hemorrhage  Stephanie Vaerla M.D.  Active

 

 Onset: 2018  Ischemic heart disease  Stephanie Varela M.D.  Active

 

 Onset: 2018  Impending infarction  Keith Mustafa M.D., [facility],  Active



     [facility] [place]  

 

 Onset: 2018  Aortic valve disorder  Keith Mustafa M.D., [facility],  Active



     [facility] [place]  

 

 Onset: 2018  Edema  Ty Jean M.D.,  Active



     [facility], [facility]  

 

 Onset: 2018  Spinal stenosis of lumbar region  Herbie Marcano M.D.  
Active

 

 Onset: 2018  Postsurgical Status Other  Herbie Marcano M.D.  Active

 

 Onset: 2018  Postsurgical Status Other  Herbie Marcano M.D.  Active

 

 Onset: 2018  Abnormal gait  Herbie Marcano M.D.  Active







Family History







 Date  Family Member(s)  Problem(s)  Comments

 

   General  Lung Cancer  father

 

   General  Arthritis  bother, sister

 

   General  Pulmonary Embolism (Pe)  father







Social History







 Type  Date  Description  Comments

 

 Lives With    Assisted living  

 

 Occupation    Retired  

 

 ETOH Use    Denies alcohol use  

 

 Smoking    Patient is a former smoker  pack and half a day, quit in



       

 

 Recreational Drug Use    Denies Drug Use  

 

 Exercise Type/Frequency    Does not exercise  







Allergies, Adverse Reactions, Alerts







 Date  Description  Reaction  Status  Severity  Comments

 

 2013  Dilaudid    active    

 

 2018  Hydromorphone Hydrochloride    active    

 

 2018  Rivaroxaban    active    







Medications







 Medication  Date  Status  Form  Strength  Qnty  SIG  Indications  Ordering



                 Provider

 

 Warfarin Sodium    Active  Tablets  3mg  90tab  1 tab every  I48.91  
        s  night or as    Touchton,



             directed    NP

 

 Levothyroxine    Active  Tablets  125mcg  30tab  1 by mouth  E03.9  Swathi



 Sodium          s  every day    Shawnee



                 NP

 

 Oxycodone HCL    Active  Tablets  5mg  30tab  1 tab PO Q  R52  Swathi



           s  8 hrs prn    [place]nee



                 ANTWON

 

 Acetaminophen    Active  Tablets  500mg    2 tabs PO    Swathi



             bid    [place]



                 NP

 

 Ferrous Sulfate    Active  Tablets  325(65Fe)  90tab  1 by mouth          mg  s  every day    Shawnee



                 ANTWON

 

 Aspercreme    Active  Patches  4%  90uni  apply qam L    



 Lidocaine          ts  shoulder,    [place]awnee,



             remove Q PM    NP

 

 Miralax    Active  Powder  3350NF  1unit  17 gm    Herbie J.



           s  mixed w/ 8    Pollack,



             abdulaziz    M.D.



             water/juice    



             On even    



             days, hold    



             for loose    



             stool.    

 

 Furosemide    Active  Tablets  20mg  90tab  1 po qd    Unknown



   /0000        s      

 

 Lantus    Active  Solution  100Unit/M  6Vial  26 units SQ  E11.9  West,



   /0000      L  s  Q hs    Maryam,



                 DO

 

 Atorvastatin    Active  Tablets  40mg  90tab  take 1  E78.5  West,



 Calcium  /0000        s  tablet at    Maryam,



             bedtime on    DO



             Odd days    

 

 Metoprolol    Active  Tablets ER  25mg  90tab  1 by mouth  I10  West,



 Succinate ER  /0000    24HR    s  every day    Maryam,



                 DO

 

 Amiodarone HCL    Active  Tablets  200mg    1 by mouth    West,



   /0000          every day    Maryam,



                 DO

 

 Humalog    Active  Solution  100Unit/M    4u subq    West,



   /0000    Cartridge  L    with    Maryam,



             breakfast,    DO



             6u subq    



             with lunch    



             and dinner    

 

 Milk Of Magnesia    Active  Suspension  400mg/5ML    30    West,



   /0000          milliliters    Maryam,



             by mouth Q    DO



             6hrs as    



             needed for    



             constipatio    



             n    

 

 Tylenol Extra    Active  Tablets  500mg    2 by mouth    West,



 Strength  /0000          Q 24 hrs as    Maryam,



             needed    DO

 

 Ascorbic Acid    Active  Tablets  500mg  30tab  1 atb PO    West,



   /0000        s  bid    Maryam,



                 DO

 

 Vitamin D3 Ultra    Active  Tablets  11872Gxph    one by  E55.9  West,



 Potency  /0000          mouth Q    Maryam,



             Month    DO

 

 Baclofen    Active    5mg    q 8hrs prn  G89.29  West,



   /0000              Maryam,



                 DO

 

 Calazime Skin    Active  Paste      apply to    West,



 Protectant/Carballo  /          buttocks Q    Maryam,



 mine            shift    DO

 

 Keflex    Active  Capsules  250mg    q 8 hours x    Bill,



             10 days    MD Naren

 

 Cholecalciferol    Active  Powder      1000 unit    Unknown



   /          tablet by    



             mouth every    



             day    

 

 Polyethylene    Active  Packet  3350NF        Unknown



 Glycol 3350  /0000              

 

 Vitamin C    Active  Capsules  500mg    1 by mouth    Unknown



             every day    

 

                 

 

 Methocarbamol    Hx  Tablets  500mg  45tab  one to two    Herbie KAMINSKI



   /        s  up to qisena Marcano



   -          prn for    [email or web address].DLaisha



   10/30          spasm    



   /2013              

 

 Hydrocodone/Acet  10/19  Hx  Tablets  5-325mg  90tab  1-2 po qid    Herbie KAMINSKI



 aminophen  /        s  prBrittney Cruz M.D.



   10/31              



   /2013              

 

 Oxycodone HCL    Hx  Tablets  5mg  90tab  1-2 po qid    Herbie KAMINSKI



   /        s  prn    Brittney Marcano M.D.



   10/19              



   /2010              

 

 Valium    Hx  Tablets  5mg  3tabs  1 po 2    Herbie KAMINSKI



   /          hours prior    Jeet,



   -          to mri may    [email or web address].DLaisha



   10/31          take one    



   /2013          more q 1 hr    



             prn anxiety    

 

 Levothyroxine    Hx  Tablets  125mcg  90tab  1 po qd    Unknown



 Sodium  /0000        s      



   -              



   10/07              



   /2017              

 

 Omeprazole    Hx  Capsules DR  20mg  90cap  1 po qd    Unknown



   /0000        s      



   -              



   10/07              



   /2017              

 

 Multivitamins    Hx  Capsules    30cap  1 capsule    Unknown



   /0000        s  dawna;y    



   -              



   10/07              



   /2017              

 

 Oxycodone HCL    Hx        up to 6    Unknown



   /0000          tabs po qd    



   -          prn    



                 

 

 Gemfibrozil    Hx  Tablets  600mg  180ta  1 po bid    Unknown



   /0000        bs      



   -              



                 

 

 Gabapentin    Hx  Capsules  100mg  240ca  2 po bid    Unknown



   /0000        ps      



   -              



   10/07              



   /2017              

 

 B-12    Hx    1200mg    1 po qd    Unknown



   /0000              



   -              



   10/07              



   /2017              

 

 Glipizide    Hx  Tablets  5mg  60tab  1 po bid    Unknown



   /0000        s      



   -              



                 

 

 Citalopram    Hx  Tablets  20mg  30tab  1 po qd    Unknown



 Hydrobromide  /0000        s      



   -              



                 

 

 Aspirin Ec  00  Hx  Tablets DR  81mg  100ta  1 by mouth    Unknown



   /0000        bs  every day    



   -              



   10/07              



   /2017              

 

 Glucosamine    Hx  Capsules  1500Com    bid    Unknown



 Chondroitin 1500  /0000              



 Complex Maximum  -              



 Strength  10/01              



   /2017              

 

 Celexa    Hx  Tablets  10mg    1 by mouth    Unknown



   /0000          every day    



   -              



   10/01              



   /2017              

 

 Docusate Sodium    Hx  Capsules  100mg    1 by mouth    Unknown



   /0000          twice a day    



   -              



   10/07              



   /2017              

 

 Humalog Mix    Hx  Suspension  (75-25)10    8 units sq    Unknown



 75/25  /0000      0Unit/ML    twice a day    



   -          with    



   10/17          breakfast    



   /2017          and dinner    

 

 Cipro    Hx  Tablets  500mg    1 by mouth    Unknown



   /0000          twice a day    



   -              



   10/07              



   /2017              

 

 Albuterol    Hx  Nebulizer  (2.5mg/3M    1 vial via    Unknown



 Sulfate  /0000      L) 0.083%    nebulizer 4    



   -          times daily    



   10/07          as needed    



   /              

 

 Warfarin Sodium    Hx  Tablets  2.5mg        Unknown



   /0000              



   -              



                 

 

 Skin Prep Wipes    Hx            Unknown



   /0000              



   -              



                 

 

 Miconazole    Hx  Cream  2%    apply twice    Unknown



 Nitrate  /0000          a day until    



   -          clear    



                 

 

 Hydrocortisone    Hx  Ointment  1%        Unknown



   /              



   -              



                 







Medications Administered in Office







 Medication  Date  Status  Form  Strength  Qnty  SIG  Indications  Ordering



                 Provider

 

 Inj,  11/15/2  Administered  Injection          Kaz MENDIOLA



 Regadenoson,  Orlando Mayes M.D.



 0.1 MG                

 

 Technetium TC  11/15/2  Administered  Injection          Kaz MENDIOLA



 99M  Orlando Mayes M.D.



 Tetrofosmin,                



 Per Unit Dose                



 Up To 40                



 Millicuries                







Vital Signs







 Date  Vital  Result  Comment

 

 2018  Heart Rate  60 /min  









 BP Systolic  130 mmHg  

 

 BP Diastolic  88 mmHg  

 

 Respiratory Rate  16 /min  

 

 Body Temperature  97.6 F  

 

 Pain Level  5  









 2018  Heart Rate  58 /min  









 BP Systolic  150 mmHg  

 

 BP Diastolic  48 mmHg  

 

 Respiratory Rate  24 /min  

 

 Pain Level  5  









 2018  Height  65 inches  5'5"









 Weight  303.00 lb  

 

 Heart Rate  68 /min  

 

 Respiratory Rate  15 /min  

 

 Body Temperature  97.5 F  

 

 Pain Level  7  

 

 BMI (Body Mass Index)  50.4 kg/m2  









 2018  Height  65 inches  5'5"









 Weight  303.00 lb  

 

 Heart Rate  80 /min  

 

 BP Systolic Sitting  126 mmHg  

 

 BP Diastolic Sitting  60 mmHg  

 

 Respiratory Rate  16 /min  

 

 Body Temperature  97.0 F  

 

 Pain Level  7  

 

 BMI (Body Mass Index)  50.4 kg/m2  









 2018  Height  65 inches  5'5"









 Heart Rate  66 /min  

 

 Respiratory Rate  20 /min  

 

 Body Temperature  96.1 F  

 

 Pain Level  0  









 2018  Heart Rate  49 /min  









 BP Systolic Sitting  134 mmHg  

 

 BP Diastolic Sitting  64 mmHg  

 

 Body Temperature  96.5 F  









 10/17/2017  Height  65 inches  5'5"









 Weight  229.00 lb  

 

 BP Systolic  115 mmHg  

 

 BP Diastolic  81 mmHg  

 

 Respiratory Rate  16 /min  

 

 Body Temperature  98.2 F  

 

 BMI (Body Mass Index)  38.1 kg/m2  









 2017  Height  65 inches  5'5"









 Weight  266.00 lb  

 

 BP Systolic  115 mmHg  

 

 BP Diastolic  83 mmHg  

 

 Body Temperature  97.7 F  

 

 Pain Level  5  

 

 BMI (Body Mass Index)  44.3 kg/m2  









 2017  Height  65 inches  5'5"









 Weight  266.00 lb  

 

 Heart Rate  72 /min  

 

 BP Systolic  130 mmHg  

 

 BP Diastolic  82 mmHg  

 

 Respiratory Rate  20 /min  

 

 Body Temperature  96.0 F  

 

 Pain Level  0  

 

 BMI (Body Mass Index)  44.3 kg/m2  









 2017  Height  65 inches  5'5"









 Weight  266.00 lb  

 

 Heart Rate  68 /min  

 

 Respiratory Rate  16 /min  

 

 Body Temperature  97.6 F  

 

 Pain Level  4  

 

 BMI (Body Mass Index)  44.3 kg/m2  









 2017  Height  65 inches  5'5"









 Weight  266.00 lb  

 

 Heart Rate  60 /min  

 

 BP Systolic  130 mmHg  

 

 BP Diastolic  68 mmHg  

 

 Respiratory Rate  16 /min  

 

 Pain Level  1  

 

 BMI (Body Mass Index)  44.3 kg/m2  









 01/10/2017  Height  65 inches  5'5"









 Weight  266.00 lb  

 

 Pain Level  0  

 

 BMI (Body Mass Index)  44.3 kg/m2  









 2016  Height  65 inches  5'5"









 Weight  266.00 lb  

 

 Respiratory Rate  18 /min  

 

 Pain Level  0  

 

 BMI (Body Mass Index)  44.3 kg/m2  









 2016  Respiratory Rate  17 /min  









 Body Temperature  98.8 F  

 

 Pain Level  0  









 11/15/2016  Height  65 inches  5'5"









 Weight  266.00 lb  

 

 Heart Rate  60 /min  

 

 Respiratory Rate  16 /min  

 

 Pain Level  0  

 

 BMI (Body Mass Index)  44.3 kg/m2  









 2016  Height  65 inches  5'5"









 Weight  266.00 lb  

 

 Pain Level  0  

 

 BMI (Body Mass Index)  44.3 kg/m2  









 10/25/2016  Height  65 inches  5'5"









 Weight  266.00 lb  

 

 Heart Rate  60 /min  

 

 Respiratory Rate  16 /min  

 

 Pain Level  0  

 

 BMI (Body Mass Index)  44.3 kg/m2  









 10/20/2016  Height  65 inches  5'5"









 Weight  266.00 lb  

 

 Pain Level  0  

 

 BMI (Body Mass Index)  44.3 kg/m2  









 10/13/2016  Height  65 inches  5'5"









 Weight  266.00 lb  

 

 Body Temperature  95.8 F  

 

 BMI (Body Mass Index)  44.3 kg/m2  









 10/06/2016  Height  65 inches  5'5"









 Weight  266.00 lb  

 

 Pain Level  2  

 

 BMI (Body Mass Index)  44.3 kg/m2  









 2016  Height  65 inches  5'5"









 Weight  266.00 lb  

 

 Heart Rate  56 /min  

 

 BP Systolic  120 mmHg  

 

 BP Diastolic  59 mmHg  

 

 BMI (Body Mass Index)  44.3 kg/m2  









 2014  Height  63 inches  5'3"









 Weight  280.00 lb  with shoes

 

 Heart Rate  64 /min  regular

 

 BP Systolic Sitting  102 mmHg  right arm large cuff

 

 BP Diastolic Sitting  58 mmHg  right arm large cuff

 

 BP Systolic Standing  100 mmHg  right arm large cuff

 

 BP Diastolic Standing  54 mmHg  right arm large cuff

 

 Respiratory Rate  18 /min  

 

 BMI (Body Mass Index)  49.6 kg/m2  









 2013  Height  63 inches  5'3"









 Weight  264.00 lb  

 

 Heart Rate  48 /min  

 

 BP Systolic  104 mmHg  Ra large cuff

 

 BP Diastolic  54 mmHg  Ra large cuff

 

 BP Systolic Sitting  104 mmHg  LA large cuff

 

 BP Diastolic Sitting  56 mmHg  LA large cuff

 

 BP Systolic Standing  100 mmHg  LA

 

 BP Diastolic Standing  54 mmHg  LA

 

 Respiratory Rate  16 /min  

 

 BMI (Body Mass Index)  46.8 kg/m2  







Results







 Test  Date  Test  Result  H/L  Range  Note

 

 Laboratory test  2016  Point of Care Glucose  209 mg/dL  High    1



 finding            

 

 Laboratory test  2016  Point of Care Glucose  182 mg/dL  High    2



 finding            

 

 Creatinine  2012  Creatinine  2.0 mg/dL  High  0.50-1.40  









 One Over Creatinine  0.50      

 

 eGFR Non-  24.9    > 60  

 

 eGFR   32.0    > 60  3









 Comp Metabolic Panel  2010  Sodium  137 mmol/L    135-145  









 Potassium  4.6 mmol/L    3.5-5.0  

 

 Chloride  106 mmol/L    101-111  

 

 Co2 (Carbon Dioxide)  22.0 mmol/L    22-32  

 

 Anion Gap  9.0 mmol/L    2-11  4

 

 Glucose  208 mg/dL  High    5

 

 BUN  32 mg/dL  High  6-24  

 

 Creatinine  1.70 mg/dL  High  0.50-1.40  

 

 One Over Creatinine  0.50      

 

 BUN/Creatinine Ratio  18.8    8-20  

 

 Calcium  8.8 mg/dL    8.1-9.9  6

 

 Total Protein  6.5 GM/DL    6.2-8.1  

 

 Albumin  3.2 GM/DL    3.2-5.2  

 

 Globulin  3.3 GM/DL    2-4  

 

 Albumin/Globulin Ratio  1.0    1-3  

 

 Bilirubin Total  0.5 mg/dL    0.4-1.5  7

 

 Alkaline Phosphatase  70 U/L      

 

 Alt (SGPT)  9 U/L  Low  14-54  

 

 Ast (Sgot)  33 U/L    12-42  

 

 eGFR Non-  32.0    > 60  

 

 eGFR   38.7    > 60  8









 CBC With Electronic Diff  2010  White Blood Count  10.6 CUMM    4.8-10.8
  









 Red Cell Count  2.75 CUMM  Low  4.2-5.4  

 

 Hemoglobin  8.6 g/dL  Low  12.0-16.0  

 

 Hematocrit  25 %  Low  35-47  

 

 Mean Corpuscular Volume  92 um3    79-97  

 

 Mean Corpuscular Hemoglob  31 pg    27-31  

 

 Mean Corpuscular HGB Cone  34 g/dL    32-36  

 

 Redcell Distribution WDTH  15 %    10.5-15  

 

 Platelet Count  252 CUMM    150-450  

 

 Mean Platelet Volume  7.3 um3  Low  7.4-10.4  









 Manual Differential  2010  Polysegmented Neutrophil  78 %    38-83  









 Lymphocyte  17 %  Low  25-47  

 

 Monocyte  4 %    0-13  

 

 Eosinophil  1 %    0-6  

 

 Absolute Neutrophil Count  8.2      

 

 RBC Morphology  NORMAL      









 Urinalysis W/Microscopic  2010  Ua Color  YELLOW    Yellow  









 Appearance-Urine  CLEAR    Clear  

 

 Specific Gravity-Ur  1.014    1.010-1.030  

 

 Esterase-Urine  2+    Negative  

 

 Nitrite  NEGATIVE    Negative  

 

 Urobilinogen-Ur-DIP  NEGATIVE    Negative  

 

 Protein-Urine  NEGATIVE    Negative  

 

 PH-Urine  5.5    5-9  

 

 Blood-Urine  NEGATIVE    Negative  

 

 Ketones-Urine  NEGATIVE    Negative  

 

 Bilirubin-Ur  NEGATIVE    Negative  

 

 Glucose-Urine  NEGATIVE    Negative  

 

 WBC-Urine  20-25    0-5  

 

 RBC-Urine  0-2    0-2  

 

 Epith Cells-Ur  FEW    None  

 

 Bacteria-Urine  4+    None  









 Urine Culture &  2010  Urine Culture  ENTEROBACTER AER <SEE      9



 Sensitivi    Sensitivi  NOTE>      

 

 Anaerobic Culture  2010  Anaerobic Culture  NG5      10



 Bottle    Bottle        

 

 Blood Culture  2010  Aerobic Culture Bottle  NG5      11

 

 Ursc-1  2010  Ampicillin  >=32      









 Amikacin  <=2      

 

 Ciprofloxacin  <=0.25      

 

 Ceftriaxone  8      

 

 Cefazolin  >=64      

 

 Nitrofurantoin  64      

 

 Gentamicin  <=1      

 

 Imipenem  2      

 

 Levofloxacin  <=0.12      

 

 Trimeth-Sulfa  <=20      

 

 Ceftazidime  16      

 

 Tigecycline  <=0.5      

 

 Piperacillin/Tazobactam  8      









 Surgical Pathology  2010  Surgical Pathology  ---------------- <SEE      
12



       NOTE>      

 

 Laboratory test  2010  Release Date  8/7/10      13, 14



 finding            

 

 Type And Screen  2010  Patient Blood Type  O POSITIVE      13









 Antibody Screen  NEGATIVE      13

 

 Specimen Discard Date  8/12/10      13, 15









 Basic Metabolic Panel  2010  Sodium  141 mmol/L    135-145  13









 Potassium  5.4 mmol/L  High  3.5-5.0  13

 

 Chloride  109 mmol/L    101-111  13

 

 Co2 (Carbon Dioxide)  22.0 mmol/L    22-32  13

 

 Anion Gap  10.0 mmol/L    2-11  13, 16

 

 Glucose  54 mg/dL  Low    13, 17

 

 BUN  49 mg/dL  High  6-24  13

 

 Creatinine  2.23 mg/dL  High  0.50-1.40  13

 

 One Over Creatinine  0.40      13

 

 BUN/Creatinine Ratio  22.0  High  8-20  13

 

 Calcium  10.2 mg/dL  High  8.1-9.9  13, 18

 

 eGFR Non-  23.4    > 60  13

 

 eGFR   28.3    > 60  13, 19









 Laboratory test finding  2010  PTT (Aptt)  29.4    25.15-38.53  13, 20

 

 Protime  2010  Inr  1.05  High  0.97-1.03  13, 21









 Protime  12.4 SEC  High  11.5-12.2  13, 22









 CBC With Electronic Diff  2010  White Blood Count  9.1 CUMM    4.8-10.8  
13









 Red Cell Count  2.77 CUMM  Low  4.2-5.4  13

 

 Hemoglobin  8.6 g/dL  Low  12.0-16.0  13

 

 Hematocrit  26 %  Low  35-47  13

 

 Mean Corpuscular Volume  94 um3    79-97  13

 

 Mean Corpuscular Hemoglob  31 pg    27-31  13

 

 Mean Corpuscular HGB [facility]  33 g/dL    32-36  13

 

 Redcell Distribution WDTH  16 %  High  10.5-15  13

 

 Platelet Count  356 CUMM    150-450  13

 

 Mean Platelet Volume  7.1 um3  Low  7.4-10.4  13

 

 Gran %  69.2 %    38-83  13

 

 Lymph %  19.2 %  Low  25-47  13

 

 Mononuclear %  8.0 %    1-9  13

 

 Eosinophil %  3.2 %    0-6  13

 

 Basophil %  0.4 %    0-2  13

 

 Abs Lymphs  1.7    1.0-4.8  13

 

 Abs Mononuclear  0.7    0-0.8  13

 

 Absolute Neutrophil Count  6.3    1.5-7.7  13

 

 Abs Eosinophils  0.3    0-0.6  13

 

 Abs Basophils  0    0-0.2  13









 1  : [id number] JOSE DUKE

 

 2  : [id number] NORI GREYSON

 

 3  *******Because ethnic data is not always readily available,



   this report includes an eGFR for both -Americans and



   non- Americans.****



   The National Kidney Disease Education Program (NKDEP) does



   not endorse the use of the MDRD equation for patients that



   are not between the ages of 18 and 70, are pregnant, have



   extremes of body size, muscle mass, or nutritional status,



   or are non- or non-.



   According to the National Kidney Foundation, irrespective of



   diagnosis, the stage of the disease is based on the level of



   kidney function:



   Stage Description                      GFR(mL/min/1.73 m(2))



   1     Kidney damage with normal or decreased GFR       90



   2     Kidney damage with mild decrease in GFR          60-89



   3     Moderate decrease in GFR                         30-59



   4     Severe decrease in GFR                           15-29



   5     Kidney failure                       <15 (or dialysis)

 

 4  Anion gap measurement may be of limited value in the



   presence of any alkalosis, especially in a combined acid



   base disorder.



   .

 

 5  ** Note change in reference range as of 08.  The



   change was based on recommendations from the American



   Diabetes Association.

 

 6  Please note change in reference range effective 08



   .

 

 7  A metabolite of Naproxen, O-desmethylnaproxen, has been



   shown to interfere with the Jendrassik-Kenefick method for



   measuring total bilirubin.  Samples from patients who have



   taken Naproxen have shown spurious elevation in total



   bilirubin levels.

 

 8  *******Because ethnic data is not always readily available,



   this report includes an eGFR for both -Americans and



   non- Americans.****



   The National Kidney Disease Education Program (NKDEP) does



   not endorse the use of the MDRD equation for patients that



   are not between the ages of 18 and 70, are pregnant, have



   extremes of body size, muscle mass, or nutritional status,



   or are non- or non-.



   According to the National Kidney Foundation, irrespective of



   diagnosis, the stage of the disease is based on the level of



   kidney function:



   Stage Description                      GFR(mL/min/1.73 m(2))



   1     Kidney damage with normal or decreased GFR       90



   2     Kidney damage with mild decrease in GFR          60-89



   3     Moderate decrease in GFR                         30-59



   4     Severe decrease in GFR                           15-29



   5     Kidney failure                       <15 (or dialysis)

 

 9  ENTEROBACTER AEROGENES



   100^,000 ORGANISMS/ML (MANY)^CCU



   NORMAL CONCHA



   25^10-25,000 ORGANISMS/ML (MODERATE)^CCU

 

 10  NO GROWTH AFTER 5 DAYS

 

 11  NO GROWTH AFTER 5 DAYS

 

 12  ---------------------------------------------------------------------------
----



   -------------



   



   RUN DATE: 08/12/10               [facility] NMI **LIVE**



   PAGE 1



   RUN TIME: 1215                            Specimen Inquiry



   RUN USER: INTERFACE



   -----------------------------------------------------------------------------
--



   -------------



   



   Name: MONIQUE PLASCENCIA                   Acct#: [id number]      Status: DIS IN



   Re/05/10



   Age/Sex: 65/F                         Unit#: [id number]       Location: [facility]. : 44



   -----------------------------------------------------------------------------
--



   -------------



   



   



   Specimen: [id number]    [facility]   Spec Date: 08/05/10        Subm Dr: Herbie navarro MD



   Spec Type: SURGICAL P          Received:  08/05/   Copies to:



   



   



   SPECIMEN



   



   L3 TO L5 LAMINA AND SPINUS PROCESS AND LIGAMENT



   



   HISTORY



   



   PRE-OP DIAGNOSIS:    L3/L5 Lumbar stenosis.



   



   



   



   GROSS DESCRIPTION



   



   The specimen is received in formalin labelled Monique Plascencia, L3 to L5



   Lamina and Spinous Process and Ligament, and consists of multiple



   fragments of bone and dense fibrous tissue measuring in aggregate



   5.0 x 4.0 x 2.5 cm. Representative section, one cassette after decal.



   



   ******DIAGNOSIS******



   



   L3-5 lamina and spinous process and ligament, laminectomy:



   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



   changes.



   B)   Normocellular bone marrow with mixed trilineal hematopoiesis.



   C)   Ligament tissue with dense fibrosis and myxoid degeneration.



   



   Signed Electronically by: FLAVIO LEVY MD 08/12/10 1214



   



   -----------------------------------------------------------------------------
--



   -------------



   



   



   



   



   



   



   



   



   



   



   



   



   



   



   



   



   -----------------------------------------------------------------------------
--



   -------------



   



   DEPARTMENT OF PATHOLOGY, [address] [place] [address]



   Phone #[phone]   Fax #[phone]   [facility] Permit #[id number]



   010



   Flavio Levy M.D. Director   Ayo Mckeon M.D. Assistant Dir



   veena



   -----------------------------------------------------------------------------
--



   -------------

 

 

 

 14  ANY BLOOD NOT GIVEN WILL BE RELEASED AT 0700 ON THE



   ABOVE DATE UNLESS DOCTOR NOTIFIES LAB OTHERWISE.

 

 15  PREADMISSION TESTING SAMPLES FOR BLOOD BANK WILL BE HELD FOR



   14 DAYS FROM THE DATE OF COLLECTION *IF* THE FOLLOWING



   CRITERIA ARE MET:



   



   1) THE PATIENT HAS *NOT* BEEN PREGNANT IN THE LAST 3 MONTHS.



   2) THE PATIENT HAS *NOT* BEEN TRANSFUSED IN THE LAST 3



   MONTHS.



   ============================================================



   PREADMISSION TESTING SAMPLES WILL *NOT* BE HELD FOR 14 DAYS



   FROM PATIENTS WHO IN THE LAST 3 MONTHS:



   



   1) HAVE BEEN PREGNANT



   2) HAVE BEEN TRANSFUSED



   



   THESE PATIENTS *MUST* BE COLLECTED WITHIN 3 DAYS OF THE



   SURGERY DATE.

 

 16  Anion gap measurement may be of limited value in the



   presence of any alkalosis, especially in a combined acid



   base disorder.



   .

 

 17  ** Note change in reference range as of 08.  The



   change was based on recommendations from the American



   Diabetes Association.

 

 18  Please note change in reference range effective 08



   .

 

 19  *******Because ethnic data is not always readily available,



   this report includes an eGFR for both -Americans and



   non- Americans.****



   The National Kidney Disease Education Program (NKDEP) does



   not endorse the use of the MDRD equation for patients that



   are not between the ages of 18 and 70, are pregnant, have



   extremes of body size, muscle mass, or nutritional status,



   or are non- or non-.



   According to the National Kidney Foundation, irrespective of



   diagnosis, the stage of the disease is based on the level of



   kidney function:



   Stage Description                      GFR(mL/min/1.73 m(2))



   1     Kidney damage with normal or decreased GFR       90



   2     Kidney damage with mild decrease in GFR          60-89



   3     Moderate decrease in GFR                         30-59



   4     Severe decrease in GFR                           15-29



   5     Kidney failure                       <15 (or dialysis)

 

 20  PLEASE NOTE NEW REFERENCE RANGE EFFECTIVE 09.

 

 21  Recommended INR for Patients



   on Oral Anticoagulants



   Prophylaxis                       2.0 - 3.0



   Treatment of thrombosis           2.0 - 3.0



   Prevention of embolism            2.0 - 3.0



   Prevention of embolism from



   prosthetic heart valves         2.5 - 3.5

 

 22  DIAGNOSIS,TREATMENT,AND THERAPY MUST BE BASED ON THE INR



   VALUE ALONE.

 

 23  Lymphopenia %



   Anemia







Procedures







 Date  CPT Code  Description  Status

 

 2018  83984  Short Leg Cast  Completed

 

 2018  17152  Removal Implant Deep Wire,Screw Nail,Stevie Or Plate  Completed

 

 2018  44772  Removal Implant Deep Wire,Screw Nail,Stevie Or Plate  Completed

 

 01/10/2017  62383  Walking Cast  Completed

 

 2016  34302  Walking Cast  Completed

 

 12/10/2016  89973  EKG, Interpretation Only  Completed

 

 2016  37916  Walking Cast  Completed

 

 2016  09225  Walking Cast  Completed

 

 11/15/2016  45031  Walking Cast  Completed

 

 2016  97267  Walking Cast  Completed

 

 10/25/2016  05744  Walking Cast  Completed

 

 10/20/2016  12924  Walking Cast  Completed

 

 10/13/2016  49340  Short Leg Cast  Completed

 

 10/06/2016  58623  Short Leg Cast  Completed

 

 2016  22529  ORIF Open TX Bimalleolar Ankle FX Includes Internal  
Completed



     Fixation  

 

 2016  07104  ORIF Open TX Bimalleolar Ankle FX Includes Internal  
Completed



     Fixation  

 

 2016  42055  EEG Recording Awake & Asleep  Completed

 

 2016  12564  EEG Recording Awake & Drowsy  Completed

 

 2016  49197  EKG, Interpretation Only  Completed

 

 2016  13037  ECHO Transthorasic Realtime 2D W Doppler & Color Flow  
Completed



     Hosp  

 

 2014  66379  EKG Tracing & Interpretation  Completed

 

 11/15/2013  89178  Stress Test  Completed

 

 11/15/2013  93566  Myocardial Perfusion Imaging Tomographic (Spect)  Completed



     Multiple Studies  

 

 2013  22168  EKG Tracing & Interpretation  Completed

 

 2013  56109  EKG, Interpretation Only  Completed

 

 2013  60884  ECHO Transthorasic Realtime 2D W Doppler & Color Flow  
Completed



     Hosp  

 

 2010  81517  Díaz/Facet/Foraminotomy;Ea Addl Segment; Cerv, Thora, Or  
Completed



     Lumbar  

 

 2010  98193  Díaz/Facet/Foraminotomy;Vertebral Segment; Lumbar  Completed







Encounters







 Type  Date  Location  Provider  CPT E/M  Dx

 

 Office Visit  05/10/2018  8:15a  [facility]  Maryam Dodson D.O.  [address]46  
I69.320









 I48.0

 

 T81.4xxS

 

 E11.9









 [facility]  [address]  
S91.002A



     vito Talley M.D.    









 I10

 

 E11.610

 

 Z78.9









 [facility]  2018 [facility]17[facility]  [address]  
S91.002A



     vito Talley M.D.    









 I10

 

 E11.610

 

 Z78.9









 [facility]  2018 [facility]13a  [facility]  [address]  
S91.002A



     [facility]    









 I10

 

 E11.610

 

 Z78.9









 Office Visit  2018 10:12a  [facility]  Iris Spivey,  [address]243  
S91.002A



     [facility]    









 E11.610









 [facility]  2018 10[facility]12a  [facility]  [address]  
S91.002A



     vito Talley M.D.    









 I10

 

 E11.610

 

 Z78.9









 Office Visit  2018 10:00a  [facility]  Naren Khan,  
03997  M14.622



     [facility]    

 

 Office Visit  2018  9:30a  [facility]  Swathi Mallory,  88506  N39.0



       NP    









 R31.0









 Office Visit  2018  9:30a  [facility]  Allison Alex MD  89079  
I69.320









 M25.572

 

 I48.0

 

 M25.512

 

 E11.9

 

 I10









 Office Visit  2018  8:15a  [facility]  Linda Rudd, ANTWON  93203  
M25.572

 

 Office Visit  2018  9:15a  [facility]  Naren Khan,  65498  
M19.172



     Of [facility]    

 

 Office Visit  2017  8:45a  [facility]  Swathi Parksmisha, ANTWON  63888  
M25.512

 

 Office Visit  2017  8:15a  [facility]  Swathimarlene Mallory, ANTWON  06304  
I63.10









 I48.0

 

 Z51.81

 

 Z79.01









 Office Visit  2017  8:30a  [facility]  Linda Rudd, ANTWON  33303  
S70.312A









 S70.311A









 Office Visit  2017 10:00a  [facility]  Shaka Velasquez MD  79670  
I63.40









 S82.842G

 

 R47.01

 

 Z99.3









 Office Visit  2017  8:15a  [facility]  Shaka Velasquez MD  55521  I48.2









 Z79.01

 

 I10

 

 E03.9

 

 Z79.4

 

 E11.9

 

 E78.5

 

 Z99.3









 Office Visit  2017  8:00a  [facility]  Linda Rudd NP  94120  R05

 

 Office Visit  10/17/2017  8:45a  [facility]  Naren Khan,  18851  
S82.842D



     Of [facility]    









 M14.672









 Office Visit  10/04/2017  8:00a  [facility]  Linda Rudd NP  75976  
M25.572

 

 Office Visit  2017  8:00a  [facility]  Kaci Ohara M.D.  52131  
I63.10









 I48.0

 

 Z51.81

 

 Z79.01









 Office Visit  2017 10:00a  [facility]  Kaci Ohara M.D.  33488  
I63.10









 I48.0

 

 Z51.81

 

 Z79.01









 Office Visit  2017 10:15a  [facility]  Naren Khan,  
68562  M14.672



     [facility]    









 M25.372









 Office Visit  2017 10:30a  [facility]  Naren Khan  58667  
S82.842D



     Of [facility]    

 

 Office Visit  2017 10:00a  [facility]  Naren Bill  05384  
S82.842D



     Of [facility]    

 

 Office Visit  2017  9:30a  [facility]  Naren Khan  14781  
S82.842D



     Of [facility]    

 

 Office Visit  01/10/2017  9:30a  [facility]  Naren Khan  41733  
S82.842D



     Of [facility]    

 

 Office Visit  2016  9:15a  [facility]  Naren Khan  19197  
S82.842D



     Of [facility]    









 S82.842D









 Office Visit  12/15/2016 12:55p  [facility],  Stephanie Varela,  
64959  K92.2



     Hospitalists  M.DLaisha    









 I48.91

 

 E11.8

 

 I10









 Office [facility]  2016 12:55p  [facility]  Stephanie Varela,  
94081  K92.2



     Hospitalists  M.DLaisha    









 I48.91

 

 E11.8

 

 I10









 Office Visit  2016 12:55p  [facility],  Stephanie Varela,  
96689  K92.2



     Hospitalists  M.DLaisha    









 I48.91

 

 E11.8

 

 I10









 Office Visit  2016 12:54p  [facility]  Stephanie Varela  
79261  K92.2



     Hospitalists  M.DLaisha    









 I48.91

 

 E11.8









 Office Visit  2016 12:54p  [facility],  Kaci Ohara,  
67213  K92.2



     Hospitalists  MLaishaDLaisha    









 I48.91

 

 E11.8

 

 I10









 Office Visit  12/10/2016 12:53p  [facility],  Kaci Ohara,  
62519  K92.2



     Hospitalists  M.D.    









 I48.91

 

 E11.8

 

 I10









 Office Visit  2016 12:53p  [facility]  Chuy Cleveland,  
99398  K92.2



     Hospitalists  N.P.    









 I48.91

 

 E11.8

 

 I10









 Office Visit  2016  9:15a  [facility]  Naren Khan,  63825  
S82.842D



     Of [facility]    









 S82.842D









 Office Visit  2016  2:32p  [facility]  Todd JamesFranklin,  90691
  E11.9



     Assoc,pc  [facility]    



     Hospitalists      









 Z96.7

 

 Z78.9

 

 Z87.81









 Office Visit  2016  2:31p  [facility]  Todd Biswas,  46749
  E11.9



     Assoc,pc  [facility]    



     Hospitalists      









 Z96.7

 

 Z78.9

 

 Z87.81









 Office Visit  2016  2:28p  [facility]aley [facility],  [address]45
  E11.9



     Assoc,  [facility]    



     Hospitalists      









 Z96.7

 

 Z78.9

 

 Z87.81









 Office Visit  2016  3:50p  [facility]  Iris Spivey,  85546  
S82.842A



     [facility]    

 

 Office Visit  2016  4:13p  [facility]  Naren Ibll,  
00964  S82.852A



     [facility]    

 

 Office Visit  2016  9:15a  [facility]  Angeles Ken MD  
61977  I69.320









 I10

 

 S82.842A

 

 Z79.01









 Office Visit  2016  7:00a  [facility]  Iris Spivey,  40984  
S82.842A



     [facility]    

 

 [facility]  2016  1:49p  [facility]  Chuysommer [place],  
11315  R47.01



     Hospitalists  N.P.    









 I48.2

 

 N30.01

 

 I63.412









 Office Visit  2016  2:43p  [facility]  Fred Frankenberg II,  51594  
R47.01



     Assoc, Hospitalists  M.D.    









 I48.2

 

 N30.01

 

 I63.412









 Office Visit  2016 12:44p  [facility]  Don Romero,  
85371  I63.9



     Hospitalists  M.D.    









 E11.9

 

 E66.01

 

 Z79.4









 Office Visit  08/15/2016 12:43p  [facility],  Don Romero,  
79074  I63.9



     Hospitalists  M.D.    









 E11.9

 

 E66.01

 

 Z79.4









 Office Visit  2016 11:33a  [facility]  Francis HANNAH  16579  
I63.512



       CAROL Rosario    









 I65.29

 

 I10

 

 I48.91









 Office Visit  2016 12:43p  [facility],  Kaci Ohara,  
43532  I63.9



     Hospitalists  M.DLaisha    









 E11.9

 

 E66.01

 

 Z79.4









 Office Visit  2016 12:12p  [facility]  Francis HANNAH  38066  
I63.512



       CAROL Rosario    









 I48.91

 

 I65.29

 

 I10









 Office Visit  2016 12:42p  [facility],  Kaci Ohara,  
36791  I63.9



     Hospitalists  M.DLasiha    









 E11.9

 

 E66.01

 

 Z79.4









 Office Visit  2016 12:09p  [facility]  Nelia Aden  
13173  I63.512



       M.D.    









 R25.1

 

 R40.0

 

 I10

 

 I48.91









 Office Visit  2016 12:42p  [facility],  Shaka Velasquez MD  
24332  I63.9



     Hospitalists      









 E11.9

 

 E66.01

 

 Z79.4









 Office Visit  2016 12:41p  [facility],  Shaka Velasquez MD  
46298  I63.9



     Hospitalists      









 E11.9

 

 E66.01

 

 Z79.4









 Office Visit  2016 12:06p  [facility]  Nelia Aden  
07364  I63.512



       M.D.    









 I10

 

 I48.91

 

 R25.1

 

 R40.0









 Office Visit  08/10/2016  1:56p  [facility]  Nelia Aden  
20221  I63.512



       MCY    









 I10

 

 I48.91

 

 I65.29









 Office Visit  08/10/2016 12:41p  [facility]  Shaka Velasquez MD  
26360  I63.9



     Hospitalists      









 E11.9

 

 E66.01

 

 Z79.4









 Office Visit  2016 12:40p  [facility]  Shaka Velasquez MD  
22992  I63.9



     Hospitalists      









 E11.9

 

 E66.01

 

 Z79.4









 Office Visit  2016  9:22a  [facility]  Greg Dickens,  
55219  I63.512



       MD    









 I10

 

 I48.91









 Office Visit  2016 12:39p  [facility],  Sedrick Lara M.D.  
99196  G45.9



     Hospitalists      









 E11.9

 

 E66.01

 

 Z79.4









 Office Visit  2014  9:30a  [facility] MARLENA Mayes,  
50130  427.31



     [facility]  M.MARLENA    

 

 Office Visit  2013  1:00p  [facility] MARLENA Mayes,  
27776  427.31



     [facility]  M.MARLENA    









 786.50

 

 V72.81

 

 794.39









 Office Visit  2013  3:51p  [facility],  Sedrick Lara M.D.  
02606  578.1



     Hospitalists      









 280.0









 Office Visit  2013  3:50p  [facility],  Sedrick Lara M.D.  
06462  578.1



     Hospitalists      









 280.0









 Office Visit  2013  3:50p  [facility],  Stephanie Varela,  
82215  578.1



     Hospitalists  M.DLaisha    









 280.0









 Office Visit  2013  3:50p  [facility],  Stephaine Varela,  
87971  578.1



     Hospitalists  M.D.    









 280.0









 Office Visit  2013  3:36p  [facility],  Stephanie Varela,  
24420  285.1



     Hospitalists  M.DLaisha    









 578.9

 

 411.89









 Office Visit  2013  3:36p  [facility],  Stephanie Varela,  
91316  285.1



     Hospitalists  MLaishaDLaisha    









 578.9

 

 411.89









 Office Visit  2013  3:36p  [facility],  Stephanie Varela,  
90469  285.1



     Hospitalists  M.DLaisha    









 578.9

 

 411.89









 Office Visit  2013  4:07p  [facility]  Keith Mustafa M.D.,  
28576  794.31



     [facility]    









 411.1

 

 424.1









 Office Visit  2013  3:35p  [facility],  Stephanie Varela,  
19791  285.1



     Hospitalists  M.DLaisha    









 578.9

 

 411.89









 Office Visit  2013  3:35p  [facility],  Stephanie Varela,  
86650  285.1



     Hospitalists  M.D.    









 578.9

 

 794.31









 Office Visit  2013  1:20p  [facility]  Herbie Marcano,  
75867  724.02



     Of C[facility]  M.D.    

 

 Office Visit  2012  1:20p  [facility]  Herbie Marcano  
92401  724.02



     Of [facility]  M.D.    

 

 Office Visit  2011  3:00p  [facility]  Herbie Marcano,  
24397  724.02



     Of [facility]  M.D.    

 

 Office Visit  2011 11:00a  [facility]  Herbie Marcano,  
60157  724.02



     Of [facility]  M.D.    

 

 Office Visit  2011 11:40a  [facility]  Herbie Marcano,  
36788  724.02



     Of [facility]  M.D.    

 

 Office Visit  2011  9:40a  [facility]  Herbie Marcano,  
96950  724.02



     Of [facility]  M.D.    

 

 Office Visit  2010  3:40p  [facility]  Herbie Marcano,  
43560  724.02



     Of C[facility]  M.D.    

 

 Office Visit  2010 10:40a  [facility]  Herbie Marcano  
64640  724.02



     Of [facility]  M.D.    

 

 Office Visit  06/15/2010  1:40p  [facility]  Herbie Marcano  
18048  781.2



     Of Cma  M.D.    

 

 Office Visit  2010  9:20a  [facility]  Herbie Marcano,  
77125  781.2



     Of [facility]  M.D.    

 

 Office Visit  2010  1:40p  [facility]  Herbie Marcano,  
13896  781.2



     Of [facility]  M.D.    







Plan of Care

Future Appointment(s):2018  9:15 am - [facility].NICOLAS2018  9:30 am - Na[facility].2018 - Naren Khan M.D.S91.002D Unspecified open 
wound, left ankle, subsequent encounterFollow up:2 weeks

## 2019-01-21 ENCOUNTER — HOSPITAL ENCOUNTER (EMERGENCY)
Dept: HOSPITAL 25 - ED | Age: 74
LOS: 1 days | Discharge: NURSING, REHAB OR LONG-TERM CARE | End: 2019-01-22
Payer: MEDICARE

## 2019-01-21 DIAGNOSIS — I25.10: ICD-10-CM

## 2019-01-21 DIAGNOSIS — Z87.891: ICD-10-CM

## 2019-01-21 DIAGNOSIS — E10.9: ICD-10-CM

## 2019-01-21 DIAGNOSIS — E66.9: ICD-10-CM

## 2019-01-21 DIAGNOSIS — M79.605: Primary | ICD-10-CM

## 2019-01-21 PROCEDURE — 84443 ASSAY THYROID STIM HORMONE: CPT

## 2019-01-21 PROCEDURE — 86140 C-REACTIVE PROTEIN: CPT

## 2019-01-21 PROCEDURE — 83036 HEMOGLOBIN GLYCOSYLATED A1C: CPT

## 2019-01-21 PROCEDURE — 99283 EMERGENCY DEPT VISIT LOW MDM: CPT

## 2019-01-21 PROCEDURE — 83605 ASSAY OF LACTIC ACID: CPT

## 2019-01-21 PROCEDURE — 36415 COLL VENOUS BLD VENIPUNCTURE: CPT

## 2019-01-21 PROCEDURE — 93005 ELECTROCARDIOGRAM TRACING: CPT

## 2019-01-21 PROCEDURE — 84484 ASSAY OF TROPONIN QUANT: CPT

## 2019-01-21 PROCEDURE — 85610 PROTHROMBIN TIME: CPT

## 2019-01-21 PROCEDURE — 85025 COMPLETE CBC W/AUTO DIFF WBC: CPT

## 2019-01-21 PROCEDURE — 80053 COMPREHEN METABOLIC PANEL: CPT

## 2019-01-22 VITALS — SYSTOLIC BLOOD PRESSURE: 176 MMHG | DIASTOLIC BLOOD PRESSURE: 90 MMHG

## 2019-01-22 LAB
ALBUMIN SERPL BCG-MCNC: 3.6 G/DL (ref 3.2–5.2)
ALBUMIN/GLOB SERPL: 1.1 {RATIO} (ref 1–3)
ALP SERPL-CCNC: 72 U/L (ref 34–104)
ALT SERPL W P-5'-P-CCNC: 13 U/L (ref 7–52)
ANION GAP SERPL CALC-SCNC: 5 MMOL/L (ref 2–11)
AST SERPL-CCNC: 14 U/L (ref 13–39)
BASOPHILS # BLD AUTO: 0.1 10^3/UL (ref 0–0.2)
BUN SERPL-MCNC: 43 MG/DL (ref 6–24)
BUN/CREAT SERPL: 22.9 (ref 8–20)
CALCIUM SERPL-MCNC: 9.4 MG/DL (ref 8.6–10.3)
CHLORIDE SERPL-SCNC: 105 MMOL/L (ref 101–111)
EOSINOPHIL # BLD AUTO: 0.4 10^3/UL (ref 0–0.6)
GLOBULIN SER CALC-MCNC: 3.4 G/DL (ref 2–4)
GLUCOSE SERPL-MCNC: 125 MG/DL (ref 70–100)
HCO3 SERPL-SCNC: 30 MMOL/L (ref 22–32)
HCT VFR BLD AUTO: 39 % (ref 35–47)
HGB BLD-MCNC: 12.6 G/DL (ref 12–16)
INR PPP/BLD: 2.25 (ref 0.77–1.02)
LYMPHOCYTES # BLD AUTO: 1.7 10^3/UL (ref 1–4.8)
MCH RBC QN AUTO: 30 PG (ref 27–31)
MCHC RBC AUTO-ENTMCNC: 33 G/DL (ref 31–36)
MCV RBC AUTO: 92 FL (ref 80–97)
MONOCYTES # BLD AUTO: 0.6 10^3/UL (ref 0–0.8)
NEUTROPHILS # BLD AUTO: 5.5 10^3/UL (ref 1.5–7.7)
NRBC # BLD AUTO: 0 10^3/UL
NRBC BLD QL AUTO: 0.1
PLATELET # BLD AUTO: 186 10^3/UL (ref 150–450)
POTASSIUM SERPL-SCNC: 4.9 MMOL/L (ref 3.5–5)
PROT SERPL-MCNC: 7 G/DL (ref 6.4–8.9)
RBC # BLD AUTO: 4.21 10^6/UL (ref 4–5.4)
SODIUM SERPL-SCNC: 140 MMOL/L (ref 135–145)
TSH SERPL-ACNC: 33.73 MCIU/ML (ref 0.34–5.6)
WBC # BLD AUTO: 8.2 10^3/UL (ref 3.5–10.8)

## 2019-01-22 NOTE — ED
Lower Extremity





- HPI Summary


HPI Summary: 





Patient from [facility] complains of left femur fracture diagnosed by x-ray 

today.  Patient denies trauma, does complain of left leg pain 2 weeks, being 

treated by oxycodone at facility.  No active pain at this time.  Denies fever, 

cough, sore throat, CV, SOB, N/V/D, abdominal pain, change in urine, change in 

BM.  Medical history CVA, DM, HTN, CK D, hypothyroid, HDL, A. fib.  History of 

left knee replacement 11 years ago, left ankle surgery 1 year ago.





- History of Current Complaint


Chief Complaint: EDExtremityLower


Stated Complaint: LT LEG PAIN


Time Seen by Provider: 01/21/19 23:37


Hx Obtained From: Patient, Family/Caretaker


Mechanism Of Injury: Unknown


Onset of Pain: Days


Onset/Duration: Weeks


Severity Currently: None


Pain Intensity: 0


Pain Scale Used: 0-10 Numeric


Timing: Intermittent


Location: Is Discrete @


Character Of Pain: Aching, Throbbing


Associated Signs And Symptoms: Positive: Negative


Able to Bear Weight: No - due to obesity





- Allergies/Home Medications


Allergies/Adverse Reactions: 


 Allergies











Allergy/AdvReac Type Severity Reaction Status Date / Time


 


hydromorphone Allergy  Altered Verified 07/04/18 20:15





   Mental  





   Status  


 


rivaroxaban [From Xarelto] Allergy  Bleeding Verified 07/04/18 20:15














PMH/Surg Hx/FS Hx/Imm Hx


Endocrine/Hematology History: Reports: Hx Diabetes - IDDM, Hx Thyroid Disease - 

hypothyroid, Hx Anemia


Cardiovascular History: Reports: Hx Atrial Fibrillation, Hx Coronary Artery 

Disease, Hx Hypercholesterolemia, Hx Hypertension, Other Cardiovascular Problems

/Disorders - A Fib, myocardial stenosis


   Denies: Hx Pacemaker/ICD


Respiratory History: Reports: Hx Asthma, Hx Sleep Apnea


GI History: Reports: Hx Gastroesophageal Reflux Disease, Hx Gastrointestinal 

Bleed, Other GI Disorders - GI BLEED


 History: Reports: Hx Kidney Infection


   Denies: Hx Dialysis, Hx Renal Disease


Musculoskeletal History: Reports: Hx Arthritis, Other Musculoskeletal History - 

CHRONIC BACK PAIN, bilat knee replacements, recent L ankle fracture


Sensory History: Reports: Hx Cataracts - Implant surgery L eye 2008, Hx 

Contacts or Glasses


   Denies: Hx Hearing Aid, Hx Hearing Problem


Opthamlomology History: Reports: Hx Cataracts - Implant surgery L eye 2008, Hx 

Contacts or Glasses


Neurological History: Reports: Hx CVA - with right-sided sequela, Hx Transient 

Ischemic Attacks (TIA) - "Mini-strokes" 20 yrs ago


   Denies: Hx Dementia, Hx Developmental Delay, Hx Headaches, Hx Migraine, Hx 

Nerve Disease, Hx Seizures, Hx Spinal Cord Injury, Other Neuro Impairments/

Disorders


Psychiatric History: Reports: Hx Anxiety, Hx Depression, Other Psychiatric 

Issues/Disorders - claustrophobia


   Denies: Hx Panic Disorder





- Cancer History


Hx Chemotherapy: No


Hx Radiation Therapy: No





- Surgical History


Surgery Procedure, Year, and Place: R knee replacement 2011, L3/L4 laminectomy.

  left knee replacement 2013.  L ankle ORIF 8/2/16


Hx Anesthesia Reactions: No





- Immunization History


Date of Tetanus Vaccine: 2011


Date of Influenza Vaccine: Fall 2012


Infectious Disease History: No


Infectious Disease History: Reports: Hx Shingles - pt states about 6 months ago

, ON HER BACK


   Denies: Hx Clostridium Difficile, Hx Hepatitis, Hx Human Immunodeficiency 

Virus (HIV), Hx of Known/Suspected MRSA, Hx Tuberculosis, History Other 

Infectious Disease, Traveled Outside the [place] in Last 30 Days





- Family History


Known Family History: Positive: Hypertension, Other - lung cancer (father), 

brain cancer (brother)


   Negative: Cardiac Disease, Diabetes





- Social History


Alcohol Use: None


Substance Use Type: Reports: None


Smoking Status (MU): Former Smoker


Type: Cigarettes


Have You Smoked in the Last Year: No





Review of Systems


Constitutional: Negative


Eyes: Negative


ENT: Negative


Cardiovascular: Negative


Respiratory: Negative


Gastrointestinal: Negative


Genitourinary: Negative


Musculoskeletal: Other


Skin: Negative


Neurological: Negative


Psychological: Normal


All Other Systems Reviewed And Are Negative: Yes





Physical Exam





- Summary


Physical Exam Summary: 





No erythema, ecchymosis, swelling, deformity noted to left hip, left extremity.

  Very minimal tenderness with palpation of lateral distal thigh.  Patient 

unable to pinpoint where pain is.  Pulses intact distally on bilateral lower 

extremities by Doppler.  Patient able to flex and extend bilateral ankles and 

bilateral knees.  Left calf soft nontender.


Triage Information Reviewed: Yes


Vital Signs On Initial Exam: 


 Initial Vitals











Temp Pulse Resp BP Pulse Ox


 


 97.9 F   51   15   220/85   96 


 


 01/21/19 23:30  01/21/19 23:30  01/21/19 23:30  01/21/19 23:30  01/21/19 23:30











Vital Signs Reviewed: Yes


Appearance: Positive: Well-Appearing


Skin: Positive: Warm


Head/Face: Positive: Normal Head/Face Inspection


Eyes: Positive: Normal


Neck: Positive: Supple


Respiratory/Lung Sounds: Positive: Clear to Auscultation


Cardiovascular: Positive: Normal


Abdomen Description: Positive: Nontender


Musculoskeletal: Positive: Normal


Neurological: Positive: Normal


Psychiatric: Positive: Normal


AVPU Assessment: Alert





- Waddy Coma Scale


Best Eye Response: 4 - Spontaneous


Best Motor Response: 6 - Obeys Commands


Best Verbal Response: 5 - Oriented


Coma Scale Total: 15





Diagnostics





- Vital Signs


 Vital Signs











  Temp Pulse Resp BP Pulse Ox


 


 01/21/19 23:30  97.9 F  51  15  220/85  96














- Laboratory


Lab Results: 


 Lab Results











  01/22/19 01/22/19 01/22/19 Range/Units





  00:56 00:56 00:56 


 


WBC  8.2    (3.5-10.8)  10^3/ul


 


RBC  4.21    (4.00-5.40)  10^6/ul


 


Hgb  12.6    (12.0-16.0)  g/dl


 


Hct  39    (35-47)  %


 


MCV  92    (80-97)  fL


 


MCH  30    (27-31)  pg


 


MCHC  33    (31-36)  g/dl


 


RDW  14    (10.5-15)  %


 


Plt Count  186    (150-450)  10^3/ul


 


MPV  8.5    (7.4-10.4)  fL


 


Neut % (Auto)  66.9    %


 


Lymph % (Auto)  20.2    %


 


Mono % (Auto)  7.4    %


 


Eos % (Auto)  4.4    %


 


Baso % (Auto)  1.1    %


 


Absolute Neuts (auto)  5.5    (1.5-7.7)  10^3/ul


 


Absolute Lymphs (auto)  1.7    (1.0-4.8)  10^3/ul


 


Absolute Monos (auto)  0.6    (0-0.8)  10^3/ul


 


Absolute Eos (auto)  0.4    (0-0.6)  10^3/ul


 


Absolute Basos (auto)  0.1    (0-0.2)  10^3/ul


 


Absolute Nucleated RBC  0    10^3/ul


 


Nucleated RBC %  0.1    


 


INR (Anticoag Therapy)   2.25 H   (0.77-1.02)  


 


Sodium    140  (135-145)  mmol/L


 


Potassium    4.9  (3.5-5.0)  mmol/L


 


Chloride    105  (101-111)  mmol/L


 


Carbon Dioxide    30  (22-32)  mmol/L


 


Anion Gap    5  (2-11)  mmol/L


 


BUN    43 H  (6-24)  mg/dL


 


Creatinine    1.88 H  (0.51-0.95)  mg/dL


 


Est GFR ( Amer)    31.7  (>60)  


 


Est GFR (Non-Af Amer)    26.2  (>60)  


 


BUN/Creatinine Ratio    22.9 H  (8-20)  


 


Glucose    125 H  ()  mg/dL


 


Lactic Acid     (0.5-2.0)  mmol/L


 


Calcium    9.4  (8.6-10.3)  mg/dL


 


Total Bilirubin    0.40  (0.2-1.0)  mg/dL


 


AST    14  (13-39)  U/L


 


ALT    13  (7-52)  U/L


 


Alkaline Phosphatase    72  ()  U/L


 


Troponin I    0.01  (<0.04)  ng/mL


 


C-Reactive Protein    2.17  (<8.01)  mg/L


 


Total Protein    7.0  (6.4-8.9)  g/dL


 


Albumin    3.6  (3.2-5.2)  g/dL


 


Globulin    3.4  (2-4)  g/dL


 


Albumin/Globulin Ratio    1.1  (1-3)  


 


TSH    Pending  














  01/22/19 Range/Units





  00:56 


 


WBC   (3.5-10.8)  10^3/ul


 


RBC   (4.00-5.40)  10^6/ul


 


Hgb   (12.0-16.0)  g/dl


 


Hct   (35-47)  %


 


MCV   (80-97)  fL


 


MCH   (27-31)  pg


 


MCHC   (31-36)  g/dl


 


RDW   (10.5-15)  %


 


Plt Count   (150-450)  10^3/ul


 


MPV   (7.4-10.4)  fL


 


Neut % (Auto)   %


 


Lymph % (Auto)   %


 


Mono % (Auto)   %


 


Eos % (Auto)   %


 


Baso % (Auto)   %


 


Absolute Neuts (auto)   (1.5-7.7)  10^3/ul


 


Absolute Lymphs (auto)   (1.0-4.8)  10^3/ul


 


Absolute Monos (auto)   (0-0.8)  10^3/ul


 


Absolute Eos (auto)   (0-0.6)  10^3/ul


 


Absolute Basos (auto)   (0-0.2)  10^3/ul


 


Absolute Nucleated RBC   10^3/ul


 


Nucleated RBC %   


 


INR (Anticoag Therapy)   (0.77-1.02)  


 


Sodium   (135-145)  mmol/L


 


Potassium   (3.5-5.0)  mmol/L


 


Chloride   (101-111)  mmol/L


 


Carbon Dioxide   (22-32)  mmol/L


 


Anion Gap   (2-11)  mmol/L


 


BUN   (6-24)  mg/dL


 


Creatinine   (0.51-0.95)  mg/dL


 


Est GFR ( Amer)   (>60)  


 


Est GFR (Non-Af Amer)   (>60)  


 


BUN/Creatinine Ratio   (8-20)  


 


Glucose   ()  mg/dL


 


Lactic Acid  1.4  (0.5-2.0)  mmol/L


 


Calcium   (8.6-10.3)  mg/dL


 


Total Bilirubin   (0.2-1.0)  mg/dL


 


AST   (13-39)  U/L


 


ALT   (7-52)  U/L


 


Alkaline Phosphatase   ()  U/L


 


Troponin I   (<0.04)  ng/mL


 


C-Reactive Protein   (<8.01)  mg/L


 


Total Protein   (6.4-8.9)  g/dL


 


Albumin   (3.2-5.2)  g/dL


 


Globulin   (2-4)  g/dL


 


Albumin/Globulin Ratio   (1-3)  


 


TSH   











Result Diagrams: 


 01/22/19 00:56





 01/22/19 00:56


Lab Statement: Any lab studies that have been ordered have been reviewed, and 

results considered in the medical decision making process.





Lower Extremity Course/Dx





- Course


Course Of Treatment: Patient from [facility] complains of left femur fracture 

diagnosed by x-ray today.  Patient denies trauma, does complain of left leg 

pain 2 weeks, being treated by oxycodone at facility.  No active pain at this 

time.  Denies fever, cough, sore throat, CV, SOB, N/V/D, abdominal pain, change 

in urine, change in BM.  Medical history CVA, DM, HTN, CK D, hypothyroid, HDL, 

A. fib.  History of left knee replacement 11 years ago, left ankle surgery 1 

year ago.  Physical exam:No erythema, ecchymosis, swelling, deformity noted to 

left hip, left extremity.  Very minimal tenderness with palpation of lateral 

distal thigh.  Patient unable to pinpoint where pain is.  Pulses intact 

distally on bilateral lower extremities by Doppler.  Patient able to flex and 

extend bilateral ankles and bilateral knees.  Left calf soft nontender.  

Patient from [facility] with x-ray report confirming fracture of the left 

distal femur.  X-ray of left femur and left knee taken here in the ED negative 

for fracture per this provider, attending physician and radiology.  Special 

request for radiology to read x-ray after 6 PM result negative for fracture.  

Patient discharged in stable condition to [facility].





- Diagnoses


Provider Diagnoses: 


 Leg pain








Discharge





- Sign-Out/Discharge


Documenting (check all that apply): Patient Departure





- Discharge Plan


Condition: Stable


Disposition: SKILLED NURSING FACILITY


Patient Education Materials:  Leg Pain (ED)


Referrals: 


Alo Espinosa MD [Primary Care Provider] - 


Additional Instructions: 


Follow-up with primary care.  Return to the ED for any new or worsening 

symptoms.





- Billing Disposition and Condition


Condition: STABLE


Disposition: Skilled Nursing Facility

## 2019-06-18 ENCOUNTER — HOSPITAL ENCOUNTER (EMERGENCY)
Dept: HOSPITAL 25 - ED | Age: 74
Discharge: HOME | End: 2019-06-18
Payer: MEDICARE

## 2019-06-18 VITALS — DIASTOLIC BLOOD PRESSURE: 54 MMHG | SYSTOLIC BLOOD PRESSURE: 151 MMHG

## 2019-06-18 DIAGNOSIS — Z87.891: ICD-10-CM

## 2019-06-18 DIAGNOSIS — M84.472A: ICD-10-CM

## 2019-06-18 DIAGNOSIS — A52.16: Primary | ICD-10-CM

## 2019-06-18 DIAGNOSIS — I10: ICD-10-CM

## 2019-06-18 DIAGNOSIS — I25.10: ICD-10-CM

## 2019-06-18 DIAGNOSIS — E03.9: ICD-10-CM

## 2019-06-18 DIAGNOSIS — I48.91: ICD-10-CM

## 2019-06-18 DIAGNOSIS — E11.9: ICD-10-CM

## 2019-06-18 PROCEDURE — 99282 EMERGENCY DEPT VISIT SF MDM: CPT

## 2019-06-18 NOTE — ED
Lower Extremity





- HPI Summary


HPI Summary: 





The patient is a 74 year old F brought in by EMS from [facility] to [facility] for 

her L ankle which is deformed. She stated that she has had past surgeries on 

her L ankle but the staff at [facility] has been re-injuring her ankle since 

her surgery. Last night she had pain in her L ankle, and today when the staff 

checked on her they found that the ankle had deviated inward. She states that 

the pain has decreased overnight. She also has pain to palpation in her L calf 

with swelling around her L ankle. Her symptoms are alleviated by rest and are 

aggravated by movement and palpation. Pt denies any fever, chills, erythema of 

eyes, sore throat, CP, SOB, cough, abdominal pain, N/V, dysuria, hematuria, 

myalgia, edema, rash, or dizziness. She has a Hx of CVA which damages to the 

left side. She is wheelchair bound on baseline. 





- History of Current Complaint


Chief Complaint: EDExtremityLower


Stated Complaint: POSS LEFT ANKLE FRACTURE PER EMS


Time Seen by Provider: 06/18/19 13:35


Hx Obtained From: Patient


Mechanism Of Injury: Unknown


Onset of Pain: Days - 1


Onset/Duration: Resolved - Pt stated that the pain has improved


Severity Initially: Moderate


Severity Currently: None


Pain Intensity: 0


Pain Scale Used: 0-10 Numeric


Timing: Constant


Associated Signs And Symptoms: Positive: Negative - Pt denies any fever, chills

, erythema of eyes, sore throat, CP, SOB, cough, abdominal pain, N/V, dysuria, 

hematuria, myalgia, edema, rash, or dizziness., Swelling, Other - Pain around 

her L ankle, L calf pain.


Aggravating Factor(s): Movement, Other - palpation


Alleviating Factor(s): Rest





- Allergies/Home Medications


Allergies/Adverse Reactions: 


 Allergies











Allergy/AdvReac Type Severity Reaction Status Date / Time


 


hydromorphone Allergy  Altered Verified 06/18/19 13:33





   Mental  





   Status  


 


rivaroxaban [From Xarelto] Allergy  Bleeding Verified 06/18/19 13:33














PMH/Surg Hx/FS Hx/Imm Hx


Previously Healthy: No


Endocrine/Hematology History: Reports: Hx Diabetes - IDDM, Hx Thyroid Disease - 

hypothyroid, Hx Anemia


Cardiovascular History: Reports: Hx Atrial Fibrillation, Hx Coronary Artery 

Disease, Hx Hypercholesterolemia, Hx Hypertension, Other Cardiovascular Problems

/Disorders - A Fib, myocardial stenosis


   Denies: Hx Pacemaker/ICD


Respiratory History: Reports: Hx Asthma, Hx Sleep Apnea


GI History: Reports: Hx Gastroesophageal Reflux Disease, Hx Gastrointestinal 

Bleed, Other GI Disorders - GI BLEED


 History: Reports: Hx Kidney Infection


   Denies: Hx Dialysis, Hx Renal Disease


Musculoskeletal History: Reports: Hx Arthritis, Other Musculoskeletal History - 

CHRONIC BACK PAIN, bilat knee replacements, recent L ankle fracture


Sensory History: Reports: Hx Cataracts - Implant surgery L eye 2008, Hx 

Contacts or Glasses


   Denies: Hx Hearing Aid, Hx Hearing Problem


Opthamlomology History: Reports: Hx Cataracts - Implant surgery L eye 2008, Hx 

Contacts or Glasses


Neurological History: Reports: Hx CVA - with right-sided sequela, Hx Transient 

Ischemic Attacks (TIA) - "Mini-strokes" 20 yrs ago


   Denies: Hx Dementia, Hx Developmental Delay, Hx Headaches, Hx Migraine, Hx 

Nerve Disease, Hx Seizures, Hx Spinal Cord Injury, Other Neuro Impairments/

Disorders


Psychiatric History: Reports: Hx Anxiety, Hx Depression, Other Psychiatric 

Issues/Disorders - claustrophobia


   Denies: Hx Panic Disorder





- Cancer History


Hx Chemotherapy: No


Hx Radiation Therapy: No





- Surgical History


Surgery Procedure, Year, and Place: R knee replacement 2011, L3/L4 laminectomy.

  left knee replacement 2013.  L ankle ORIF 8/2/16


Hx Anesthesia Reactions: No





- Immunization History


Date of Tetanus Vaccine: 2011


Date of Influenza Vaccine: Fall 2012


Infectious Disease History: No


Infectious Disease History: Reports: Hx Shingles - pt states about 6 months ago

, ON HER BACK


   Denies: Hx Clostridium Difficile, Hx Hepatitis, Hx Human Immunodeficiency 

Virus (HIV), Hx of Known/Suspected MRSA, Hx Tuberculosis, History Other 

Infectious Disease, Traveled Outside the [place] in Last 30 Days





- Family History


Known Family History: Positive: Hypertension, Other - lung cancer (father), 

brain cancer (brother)


   Negative: Cardiac Disease, Diabetes





- Social History


Lives: At The [facility] Home


Alcohol Use: None


Hx Substance Use: No - [facility]


Substance Use Type: Reports: None


Hx Tobacco Use: Yes


Smoking Status (MU): Former Smoker


Type: Cigarettes


Have You Smoked in the Last Year: No





Review of Systems


Negative: Fever, Chills


Negative: Erythema


Negative: Sore Throat


Negative: Chest Pain


Negative: Shortness Of Breath, Cough


Negative: Abdominal Pain, Vomiting, Nausea


Negative: dysuria, hematuria


Positive: Other - L ankle deformity, L calf pain with palpation, Swelling 

around the L ankle. .  Negative: Myalgia, Edema


Negative: Rash


All Other Systems Reviewed And Are Negative: Yes





Physical Exam





- Summary


Physical Exam Summary: 





Constitutional: Well-developed, Well-nourished, Alert. (-) Distressed


Skin: Warm, Dry


HENT: Normocephalic; Atraumatic


Eyes: Conjunctiva normal


Neck: Musculoskeletal ROM normal neck. (-) JVD, (-) Stridor, (-) Tracheal 

deviation


Cardio: Rhythm regular, rate normal, Heart sounds normal; Intact distal pulses; 

The pedal pulses are 2+ and symmetric. Radial pulses are 2+ and symmetric. (-) 

Murmur, good dorsalis pedal pulses, toes are cold to the touch,


Pulmonary/Chest wall: Effort normal. (-) Respiratory distress, (-) Wheezes, (-) 

Rales


Abd: Soft, (-) tenderness, (-) Distension, (-) Guarding, (-) Rebound


Musculoskeletal: (-) Edema, L ankle is angulated inward, tender over the tibia 

and fibula


Lymph: (-) Cervical adenopathy


Neuro: Alert, Oriented x3


Psych: Mood and affect Normal





Triage Information Reviewed: Yes


Vital Signs On Initial Exam: 


 Initial Vitals











Temp Pulse Resp BP Pulse Ox


 


 97.2 F   48   18   167/56   95 


 


 06/18/19 13:23  06/18/19 13:23  06/18/19 13:23  06/18/19 13:23  06/18/19 13:23











Vital Signs Reviewed: Yes





Diagnostics





- Vital Signs


 Vital Signs











  Temp Pulse Resp BP Pulse Ox


 


 06/18/19 13:23  97.2 F  48  18  167/56  95














- Laboratory


Lab Statement: Any lab studies that have been ordered have been reviewed, and 

results considered in the medical decision making process.





- Radiology


  ** Knee X-Ray


Radiology Interpretation Completed By: Radiologist


Summary of Radiographic Findings: Anteromedial soft tissue swelling.  Negative 

for fracture or stigmata of prosthesis loosening. ED Physician has reviewed 

this report.





  ** Ankle X-Ray


Radiology Interpretation Completed By: Radiologist


Summary of Radiographic Findings: 1. CHRONIC DISPLACED UNUNITED FRACTURE OF THE 

MEDIAL MALLEOLUS, UNCHANGED.  2. CHRONIC MEDIAL SUBLUXATION OF THE TALUS 

RELATIVE TO THE TIBIA AND FIBULA, UNCHANGED.  3. STATUS POST OPERATIVE 

REDUCTION INTERNAL FIXATION OF THE DISTAL TIBIA. THE 2 DISTAL MOST SURGICAL 

SCREWS ERODE INTO THE SUPERIOR PORTION OF THE TALUS WHICH IS UNCHANGED. ED 

Physician has reviewed this report.





Re-Evaluation





- Re-Evaluation


  ** First Eval


Re-Evaluation Time: 14:05


Change: Unchanged


Comment: No hardware appears to be protruding from the L ankle.





Lower Extremity Course/Dx





- Course


Course Of Treatment: he patient is a 74 year old F brought in by EMS from 

[facility] to [facility] for her L ankle which is deformed. She stated that she 

has had past surgeries on her L ankle but the staff at [facility] has been re-

injuring her ankle since her surgery. Last night she had pain in her L ankle, 

and today when the staff checked on her they found that the ankle had deviated 

inward.  Upon her PE it is found that her La ankle is angulated inward, she is 

tender over the tibia and fibula, her toes are cold to the touch, and she has 

good dorsalis pedal pulses.  Pt's Knee X-Ray shows: Anteromedial soft tissue 

swelling.  Negative for fracture or stigmata of prosthesis loosening.  Her 

Ankle X-Ray shows: 1. CHRONIC DISPLACED UNUNITED FRACTURE OF THE MEDIAL 

MALLEOLUS, UNCHANGED.  2. CHRONIC MEDIAL SUBLUXATION OF THE TALUS RELATIVE TO 

THE TIBIA AND FIBULA, UNCHANGED.  3. STATUS POST OPERATIVE REDUCTION INTERNAL 

FIXATION OF THE DISTAL TIBIA. THE 2 DISTAL.  MOST SURGICAL SCREWS ERODE INTO 

THE SUPERIOR PORTION OF THE TALUS WHICH IS UNCHANGED.  Pt will be Dx with 

Charcot ankle and chronic ankle fractures. She is in no acute distress, no 

signs of infection or redness, and no significant pain. She will be instructed 

to follow up with Dr. Khan in 2-3 days.





- Diagnoses


Provider Diagnoses: 


 Charcot ankle, Ankle fracture, left








Discharge





- Sign-Out/Discharge


Documenting (check all that apply): Patient Departure - discharge


Patient Received Moderate/Deep Sedation with Procedure: No





- Discharge Plan


Condition: Stable


Disposition: HOME


Patient Education Materials:  Ankle Fracture (ED), Foot Care for People with 

Diabetes (ED)


Referrals: 


Naren Khan MD [Medical Doctor] - 2 Days


Additional Instructions: 


Please follow up with Dr. Khan, Orthopedics, in 2-3 days and return to the 

emergency room for any new or worsening symptoms. Please review the attached 

education materials. 





- Billing Disposition and Condition


Condition: STABLE


Disposition: Home





- Attestation Statements


Document Initiated by Scribe: Yes


Documenting Scribe: Roderick Simmons


Provider For Whom Scribe is Documenting (Include Credential): Georgi Lowery MD


Scribe Attestation: 


I, Roderick Simmons, scribed for Georgi Lowery MD on 06/20/19 at 1037. 


Scribe Documentation Reviewed: Yes


Provider Attestation: 


The documentation as recorded by the aliciaibRoderick fine accurately reflects 

the service I personally performed and the decisions made by me, Georgi Lowery MD


Status of Scribe Document: Viewed





Consult


Consult: 


Spoke with the nursing staff at [facility] at 1404 who stated that the pt was 

complaining of pain this morning when she woke up. According to the staff the pt

's ankle has been deformed but appeared more deformed to day. She is non-

ambulatory.

## 2019-11-19 ENCOUNTER — HOSPITAL ENCOUNTER (INPATIENT)
Dept: HOSPITAL 25 - ED | Age: 74
LOS: 3 days | Discharge: NURSING, REHAB OR LONG-TERM CARE | DRG: 689 | End: 2019-11-22
Attending: HOSPITALIST | Admitting: HOSPITALIST
Payer: MEDICARE

## 2019-11-19 DIAGNOSIS — B96.20: ICD-10-CM

## 2019-11-19 DIAGNOSIS — Z79.01: ICD-10-CM

## 2019-11-19 DIAGNOSIS — G47.33: ICD-10-CM

## 2019-11-19 DIAGNOSIS — M54.9: ICD-10-CM

## 2019-11-19 DIAGNOSIS — J45.909: ICD-10-CM

## 2019-11-19 DIAGNOSIS — E11.22: ICD-10-CM

## 2019-11-19 DIAGNOSIS — R40.2132: ICD-10-CM

## 2019-11-19 DIAGNOSIS — H26.9: ICD-10-CM

## 2019-11-19 DIAGNOSIS — I12.9: ICD-10-CM

## 2019-11-19 DIAGNOSIS — G89.29: ICD-10-CM

## 2019-11-19 DIAGNOSIS — G93.41: ICD-10-CM

## 2019-11-19 DIAGNOSIS — Z66: ICD-10-CM

## 2019-11-19 DIAGNOSIS — F41.9: ICD-10-CM

## 2019-11-19 DIAGNOSIS — Z98.42: ICD-10-CM

## 2019-11-19 DIAGNOSIS — R20.0: ICD-10-CM

## 2019-11-19 DIAGNOSIS — Z88.5: ICD-10-CM

## 2019-11-19 DIAGNOSIS — I65.23: ICD-10-CM

## 2019-11-19 DIAGNOSIS — E66.9: ICD-10-CM

## 2019-11-19 DIAGNOSIS — F32.9: ICD-10-CM

## 2019-11-19 DIAGNOSIS — E78.5: ICD-10-CM

## 2019-11-19 DIAGNOSIS — E03.9: ICD-10-CM

## 2019-11-19 DIAGNOSIS — Z16.11: ICD-10-CM

## 2019-11-19 DIAGNOSIS — R40.2222: ICD-10-CM

## 2019-11-19 DIAGNOSIS — K21.9: ICD-10-CM

## 2019-11-19 DIAGNOSIS — N18.9: ICD-10-CM

## 2019-11-19 DIAGNOSIS — Z72.89: ICD-10-CM

## 2019-11-19 DIAGNOSIS — I69.320: ICD-10-CM

## 2019-11-19 DIAGNOSIS — I69.351: ICD-10-CM

## 2019-11-19 DIAGNOSIS — E78.00: ICD-10-CM

## 2019-11-19 DIAGNOSIS — R79.89: ICD-10-CM

## 2019-11-19 DIAGNOSIS — R40.2342: ICD-10-CM

## 2019-11-19 DIAGNOSIS — N39.0: Primary | ICD-10-CM

## 2019-11-19 DIAGNOSIS — F40.240: ICD-10-CM

## 2019-11-19 DIAGNOSIS — Z79.890: ICD-10-CM

## 2019-11-19 DIAGNOSIS — I48.91: ICD-10-CM

## 2019-11-19 DIAGNOSIS — Z87.891: ICD-10-CM

## 2019-11-19 DIAGNOSIS — I25.10: ICD-10-CM

## 2019-11-19 DIAGNOSIS — Z96.653: ICD-10-CM

## 2019-11-19 DIAGNOSIS — Z88.8: ICD-10-CM

## 2019-11-19 DIAGNOSIS — Z79.4: ICD-10-CM

## 2019-11-19 LAB
ALBUMIN SERPL BCG-MCNC: 3.5 G/DL (ref 3.2–5.2)
ALBUMIN/GLOB SERPL: 1 {RATIO} (ref 1–3)
ALP SERPL-CCNC: 85 U/L (ref 34–104)
ALT SERPL W P-5'-P-CCNC: 10 U/L (ref 7–52)
ANION GAP SERPL CALC-SCNC: 5 MMOL/L (ref 2–11)
AST SERPL-CCNC: 15 U/L (ref 13–39)
BASOPHILS # BLD AUTO: 0.1 10^3/UL (ref 0–0.2)
BUN SERPL-MCNC: 36 MG/DL (ref 6–24)
BUN/CREAT SERPL: 25 (ref 8–20)
CALCIUM SERPL-MCNC: 9.7 MG/DL (ref 8.6–10.3)
CHLORIDE SERPL-SCNC: 110 MMOL/L (ref 101–111)
EOSINOPHIL # BLD AUTO: 0.2 10^3/UL (ref 0–0.6)
GLOBULIN SER CALC-MCNC: 3.6 G/DL (ref 2–4)
GLUCOSE SERPL-MCNC: 136 MG/DL (ref 70–100)
HCO3 SERPL-SCNC: 25 MMOL/L (ref 22–32)
HCT VFR BLD AUTO: 37 % (ref 35–47)
HGB BLD-MCNC: 12 G/DL (ref 12–16)
LYMPHOCYTES # BLD AUTO: 1 10^3/UL (ref 1–4.8)
MCH RBC QN AUTO: 29 PG (ref 27–31)
MCHC RBC AUTO-ENTMCNC: 32 G/DL (ref 31–36)
MCV RBC AUTO: 91 FL (ref 80–97)
MONOCYTES # BLD AUTO: 0.6 10^3/UL (ref 0–0.8)
NEUTROPHILS # BLD AUTO: 7.1 10^3/UL (ref 1.5–7.7)
NRBC # BLD AUTO: 0 10^3/UL
NRBC BLD QL AUTO: 0.1
PLATELET # BLD AUTO: 228 10^3/UL (ref 150–450)
POTASSIUM SERPL-SCNC: 4.8 MMOL/L (ref 3.5–5)
PROT SERPL-MCNC: 7.1 G/DL (ref 6.4–8.9)
RBC # BLD AUTO: 4.1 10^6 /UL (ref 3.7–4.87)
RBC UR QL AUTO: (no result)
SODIUM SERPL-SCNC: 140 MMOL/L (ref 135–145)
TROPONIN I SERPL-MCNC: 0.06 NG/ML (ref ?–0.03)
TROPONIN I SERPL-MCNC: 0.08 NG/ML (ref ?–0.03)
TSH SERPL-ACNC: 7.87 MCIU/ML (ref 0.34–5.6)
VIT C UR QL: (no result)
WBC # BLD AUTO: 9 10^3/UL (ref 3.5–10.8)
WBC UR QL AUTO: (no result)

## 2019-11-19 PROCEDURE — 99284 EMERGENCY DEPT VISIT MOD MDM: CPT

## 2019-11-19 PROCEDURE — 87086 URINE CULTURE/COLONY COUNT: CPT

## 2019-11-19 PROCEDURE — 70498 CT ANGIOGRAPHY NECK: CPT

## 2019-11-19 PROCEDURE — 95816 EEG AWAKE AND DROWSY: CPT

## 2019-11-19 PROCEDURE — 80307 DRUG TEST PRSMV CHEM ANLYZR: CPT

## 2019-11-19 PROCEDURE — 85025 COMPLETE CBC W/AUTO DIFF WBC: CPT

## 2019-11-19 PROCEDURE — 70496 CT ANGIOGRAPHY HEAD: CPT

## 2019-11-19 PROCEDURE — 36415 COLL VENOUS BLD VENIPUNCTURE: CPT

## 2019-11-19 PROCEDURE — 81003 URINALYSIS AUTO W/O SCOPE: CPT

## 2019-11-19 PROCEDURE — 70547 MR ANGIOGRAPHY NECK W/O DYE: CPT

## 2019-11-19 PROCEDURE — 84484 ASSAY OF TROPONIN QUANT: CPT

## 2019-11-19 PROCEDURE — 87186 SC STD MICRODIL/AGAR DIL: CPT

## 2019-11-19 PROCEDURE — 87077 CULTURE AEROBIC IDENTIFY: CPT

## 2019-11-19 PROCEDURE — 87040 BLOOD CULTURE FOR BACTERIA: CPT

## 2019-11-19 PROCEDURE — 80048 BASIC METABOLIC PNL TOTAL CA: CPT

## 2019-11-19 PROCEDURE — 81015 MICROSCOPIC EXAM OF URINE: CPT

## 2019-11-19 PROCEDURE — 87641 MR-STAPH DNA AMP PROBE: CPT

## 2019-11-19 PROCEDURE — 96374 THER/PROPH/DIAG INJ IV PUSH: CPT

## 2019-11-19 PROCEDURE — 80053 COMPREHEN METABOLIC PANEL: CPT

## 2019-11-19 PROCEDURE — G0378 HOSPITAL OBSERVATION PER HR: HCPCS

## 2019-11-19 PROCEDURE — 83605 ASSAY OF LACTIC ACID: CPT

## 2019-11-19 PROCEDURE — 93005 ELECTROCARDIOGRAM TRACING: CPT

## 2019-11-19 PROCEDURE — 80320 DRUG SCREEN QUANTALCOHOLS: CPT

## 2019-11-19 PROCEDURE — 84443 ASSAY THYROID STIM HORMONE: CPT

## 2019-11-19 PROCEDURE — 80061 LIPID PANEL: CPT

## 2019-11-19 PROCEDURE — 71045 X-RAY EXAM CHEST 1 VIEW: CPT

## 2019-11-19 PROCEDURE — G0480 DRUG TEST DEF 1-7 CLASSES: HCPCS

## 2019-11-19 PROCEDURE — 70450 CT HEAD/BRAIN W/O DYE: CPT

## 2019-11-19 PROCEDURE — 70551 MRI BRAIN STEM W/O DYE: CPT

## 2019-11-19 PROCEDURE — 84439 ASSAY OF FREE THYROXINE: CPT

## 2019-11-19 RX ADMIN — INSULIN LISPRO SCH: 100 INJECTION, SOLUTION INTRAVENOUS; SUBCUTANEOUS at 18:58

## 2019-11-19 RX ADMIN — INSULIN LISPRO SCH: 100 INJECTION, SOLUTION INTRAVENOUS; SUBCUTANEOUS at 22:41

## 2019-11-19 RX ADMIN — ATORVASTATIN CALCIUM SCH: 20 TABLET, FILM COATED ORAL at 22:41

## 2019-11-19 RX ADMIN — INSULIN GLARGINE SCH: 100 INJECTION, SOLUTION SUBCUTANEOUS at 18:58

## 2019-11-19 NOTE — ED
Altered Mental Status





- HPI Summary


HPI Summary: 


This patient is a 74 year old F BIBA via EMS to ED with a chief complaint of 

AMS since unknown onset. Patient is a patient at [facility], who found her 

unresponsive except to pain this morning. Patient is accompanied by her daughter

, who states that the patient is completely alert and oriented at baseline. Per 

daughter, patient was confused yesterday (unable to take a straw to the lip) 

and she did not recognize her brother. Her daughter states that the patient 

usually gets disoriented when she has a UTI. Patients blood sugar per EMS was 

174. Patient is taking Eliquis and she started Baclofen two days ago. 

Medications reviewed. Allergies noted. LEVEL 5 CAVEAT: COMPLETE HPI 

UNATTAINABLE DUE TO PATIENT AMS.





- History Of Current Complaint


Stated Complaint: UNRESPONSIVE PER EMS


Hx Obtained From: Family/Caretaker - Daughter, EMS


Hx From Patient Unobtainable Due To: Altered Mental Status


Onset/Duration: Unknown, Still Present


Severity Initially: Mild


Severity Currently: Mild


Character: Responsiveness


Aggravating Factor(s): Nothing


Alleviating Factor(s): Nothing





- Allergies/Home Medications


Allergies/Adverse Reactions: 


 Allergies











Allergy/AdvReac Type Severity Reaction Status Date / Time


 


hydromorphone Allergy  Altered Verified 06/18/19 13:33





   Mental  





   Status  


 


rivaroxaban [From Xarelto] Allergy  Bleeding Verified 06/18/19 13:33











Home Medications: 


 Home Medications





Amiodarone TAB* [Cordarone TAB*] 200 mg PO DAILY 11/19/19 [History Confirmed 11/ 19/19]


Apixaban* [Eliquis*] 5 mg PO BID 11/19/19 [History Confirmed 11/19/19]


Bisacodyl SUPP* [Dulcolax Supp*] 10 mg NE Q24HR 11/19/19 [History Confirmed 11/ 19/19]


Calazime Cream 1 applic TOPICAL Q6H 11/19/19 [History Confirmed 11/19/19]


Diclofenac 1% GEL (NF) [Voltaren 1% GEL (NF)] 2 gm TOPICAL QID 11/19/19 [

History Confirmed 11/19/19]


guaiFENesin [Diabetic Tussin Ex] 5 ml PO Q6H PRN 11/19/19 [History Confirmed 11/ 19/19]


traMADol TAB* [Ultram*] 50 mg PO Q6HR PRN 11/19/19 [History Confirmed 11/19/19]











PMH/Surg Hx/FS Hx/Imm Hx


Previously Healthy: No - LEVEL 5 CAVEAT: COMPLETE PMH UNATTAINABLE DUE TO 

PATIENT AMS.


Endocrine/Hematology History: Reports: Hx Diabetes - IDDM, Hx Thyroid Disease - 

hypothyroid, Hx Anemia


Cardiovascular History: Reports: Hx Atrial Fibrillation, Hx Coronary Artery 

Disease, Hx Hypercholesterolemia, Hx Hypertension, Other Cardiovascular Problems

/Disorders - A Fib, myocardial stenosis


   Denies: Hx Pacemaker/ICD


Respiratory History: Reports: Hx Asthma, Hx Sleep Apnea


GI History: Reports: Hx Gastroesophageal Reflux Disease, Hx Gastrointestinal 

Bleed, Other GI Disorders - GI BLEED


 History: Reports: Hx Kidney Infection


   Denies: Hx Dialysis, Hx Renal Disease


Musculoskeletal History: Reports: Hx Arthritis, Other Musculoskeletal History - 

CHRONIC BACK PAIN, bilat knee replacements, recent L ankle fracture


Sensory History: Reports: Hx Cataracts - Implant surgery L eye 2008, Hx 

Contacts or Glasses


   Denies: Hx Hearing Aid, Hx Hearing Problem


Opthamlomology History: Reports: Hx Cataracts - Implant surgery L eye 2008, Hx 

Contacts or Glasses


Neurological History: Reports: Hx CVA - with right-sided sequela, Hx Transient 

Ischemic Attacks (TIA) - "Mini-strokes" 20 yrs ago


   Denies: Hx Dementia, Hx Developmental Delay, Hx Headaches, Hx Migraine, Hx 

Nerve Disease, Hx Seizures, Hx Spinal Cord Injury, Other Neuro Impairments/

Disorders


Psychiatric History: Reports: Hx Anxiety, Hx Depression, Other Psychiatric 

Issues/Disorders - claustrophobia


   Denies: Hx Panic Disorder





- Cancer History


Hx Chemotherapy: No


Hx Radiation Therapy: No





- Surgical History


Surgery Procedure, Year, and Place: R knee replacement 2011, L3/L4 laminectomy.

  left knee replacement 2013.  L ankle ORIF 8/2/16


Hx Anesthesia Reactions: No





- Immunization History


Date of Tetanus Vaccine: 2011


Date of Influenza Vaccine: Fall 2012


Infectious Disease History: Reports: Hx Shingles - pt states about 6 months ago

, ON HER BACK


   Denies: Hx Clostridium Difficile, Hx Hepatitis, Hx Human Immunodeficiency 

Virus (HIV), Hx of Known/Suspected MRSA, Hx Tuberculosis, History Other 

Infectious Disease





- Family History


Known Family History: Positive: Hypertension, Other - lung cancer (father), 

brain cancer (brother)


   Negative: Cardiac Disease, Diabetes





- Social History


Alcohol Use: None


Hx Substance Use: No - [facility]


Substance Use Type: Reports: None


Hx Tobacco Use: Yes


Smoking Status (MU): Former Smoker


Type: Cigarettes


Have You Smoked in the Last Year: No





Review of Systems





- ROS Summary


Review of Systems Summary: 


LEVEL 5 CAVEAT: COMPLETE ROS UNATTAINABLE DUE TO PATIENT AMS.


Positive: Cough


Neurological: Other - Confusion, AMS


All Other Systems Reviewed And Are Negative: No





Physical Exam





- Summary


Physical Exam Summary: 


LEVEL 5 CAVEAT: COMPLETE PE UNATTAINABLE DUE TO PATIENT AMS.


Constitutional: Well-developed, Well-nourished, Alert. (-) Distressed


Skin: Warm, Dry


HENT: Normocephalic; Atraumatic


Eyes: Pupils are 3mm and reactive to light


Neck: Musculoskeletal ROM normal neck. (-) JVD, (-) Stridor, (-) Tracheal 

deviation


Cardio: Rhythm regular, rate normal, Heart sounds normal; Intact distal pulses; 

Radial pulses are 2+ and symmetric. (-) Murmur


Pulmonary/Chest wall: Effort normal. (-) Respiratory distress, (-) Wheezes, (-) 

Rales


Abd: Soft, (-) tenderness, (-) Distension, (-) Guarding, (-) Rebound


Musculoskeletal: (-) Edema


Lymph: (-) Cervical adenopathy


Neuro: sonorous respirations, does open her eyes to name, nonverbal, is 

spontaneous to moving all four extremities


GCS: 9


Psych: Mood and affect Normal


Triage Information Reviewed: Yes


Vital Signs On Initial Exam: 





 Initial Vitals











Temp Pulse Resp BP Pulse Ox


 


 98.7 F   47   16   215/82   98 


 


 11/19/19 12:01  11/19/19 12:01  11/19/19 12:01  11/19/19 12:01  11/19/19 12:01











Vital Signs Reviewed: Yes





- [place] Coma Scale


Best Eye Response: 3 - To Speech


Best Motor Response: 4 - Withdraws


Best Verbal Response: 2 - Incomprehensible Words


Coma Scale Total: 9





Procedures





- Sedation


Patient Received Moderate/Deep Sedation with Procedure: No





Diagnostics





- Laboratory


Result Diagrams: 


 11/19/19 12:35





 11/19/19 12:35


Lab Statement: Any lab studies that have been ordered have been reviewed, and 

results considered in the medical decision making process.





- Radiology


  ** CXR


Radiology Interpretation Completed By: Radiologist


Summary of Radiographic Findings: LOW LUNG VOLUMES. NO ACTIVE CARDIOPULMONARY 

DISEASE. Dr. Cuellar has reviewed this radiology report.





- CT


  ** Brain


CT Interpretation Completed By: Radiologist


Summary of CT Findings: Wedge-shaped defect in the left posterior parietal lobe 

suggestive of evolving infarct. Chronic ischemic White matter change is noted. 

Dr. Cuellar has reviewed this radiology report.





- EKG


  ** 1248


Cardiac Rate: Bradycardia - 44 BPM


EKG Rhythm: Sinus Bradycardia


Summary of EKG Findings: Sinus bradycardia at 44 BPM, T-wave inversion in aVR 

and V1. No other abnormality.





Re-Evaluation





- Re-Evaluation


  ** First Eval


Re-Evaluation Time: 13:35


Comment: Patient is feeling slightly better but reports new pain in the 

bilateral arms, legs, chest, and abdomen (all over the place). She is able to 

respond to questions. Discussed results with patient and her family. Patient 

has UTI so I will give abx. Patient agrees to be admitted to [facility].





Altered Mental Statu Course/Dx





- Course


Course Of Treatment: Patient is here with altered mental status.  Patient 

arrives breathing on her own with a GCS of 9.  Patient had a negative CT head 

for intracranial hemorrhage.  Patient was bradycardic and hypertensive with 

bradycardia being her baseline per family.  Patient had a negative EKG for any 

ischemic change.  Patient had a negative chest x-ray.  Patient had a UA which 

showed UTI.  Patient had her mentation improve while she was in the ED.  

Patient is likely suffering from a UTI and had altered mental status compounded 

with her recent baclofen use.  Patient does have an elevated troponin but I do 

not believe this represents a dissection.





- Diagnoses


Provider Diagnoses: 


 Altered mental status, Drug reaction, Urinary tract infection








- Provider Notifications


Discussed Care Of Patient With: Lorenzo Byrd


Time Discussed With Above Provider: 13:47


Instructed by Provider To: Admit As Inpatient - Discussed patient case with Dr. Byrd, hospitalist, who accepted the patient for admission to [facility].





- Critical Care Time


Critical Care Time: 30-74 min - 35 minutes





Discharge ED





- Sign-Out/Discharge


Documenting (check all that apply): Patient Departure - Admit





- Discharge Plan


Condition: Stable


Disposition: ADMITTED TO [facility]


Referrals: 


Alo Espinosa MD [Primary Care Provider] - 





- Billing Disposition and Condition


Condition: STABLE


Disposition: Admitted to [facility]





- Attestation Statements


Document Initiated by Scribe: Yes


Documenting Scribe: Viriglio Burnette


Provider For Whom Scribe is Documenting (Include Credential): Aaron Cuellar MD


Scribe Attestation: 


I, Virgilio Burnette, scribed for Aaron Cuellar MD on 11/19/19 at 1522. 


Scribe Documentation Reviewed: Yes


Provider Attestation: 


The documentation as recorded by the scribe, Virgilio Burnette accurately reflects 

the service I personally performed and the decisions made by me, Aaron Cuellar MD


Status of Scribe Document: Viewed

## 2019-11-19 NOTE — CONS
CONSULTATION REPORT:

 

DATE OF CONSULT:  11/19/19

 

PATIENT OF:  JAILENE Person.

 

HISTORY OF PRESENT ILLNESS:  This is a 74-year-old woman who had new onset of 
confusion yesterday and did not recognize her brother.  Today, she was found to 
be unresponsive except to pain and therefore was brought to the emergency room.
  Of note, the patient has sleep apnea.

 

PAST MEDICAL HISTORY:  Significant for paroxysmal atrial fibrillation, and she 
has had an expressive aphasia due to cardioembolic stroke in 2016.  She has 
diabetes, hypertension, and history of GI bleed, on Xarelto.  She also has a 
history of small bowel AVMs.  She has had a cardioembolic stroke in 2016 with 
residual mild aphasia and right-sided weakness, hyperlipidemia, obesity, 
chronic back pain, hypothyroidism, obstructive sleep apnea.

 

PAST SURGICAL HISTORY:  Status post total knee replacement bilaterally and a 
laminectomy at L3-4.

 

MEDICATIONS:  Include at home:

1.  Amiodarone 200 mg daily.

2.  Baclofen 10 mg p.r.n., recently stopped.

3.  Guaifenesin 5 mL p.r.n.

4.  Dulcolax.

5.  Eliquis 5 mg b.i.d.

6.  Insulin 4 units q.a.m.

7.  Furosemide 20 mg daily.

8.  Lantus insulin 28 units q.p.m.

9.  Synthroid 175 mcg daily.

10.  Lipitor 20 mg daily.

11.  Metoprolol 25 mg daily.

12.  Tramadol 50 mg q.6 hours p.r.n.

 

ALLERGIES:  She is allergic to HYDROMORPHONE and RIVAROXABAN causing bleeding.

 

SOCIAL HISTORY:  She has 30 plus year history of smoking, but does not abuse 
any other substances.

 

REVIEW OF SYSTEMS:  Unobtainable from the patient.  The daughter says that she 
has been in her usual state of health.

 

PHYSICAL EXAM:  Temperature 98.7, pulse 42, respirations 13, blood pressure 156/
73. She had her eyes closed, but would arouse easily.  She had a dense aphasia.
  She was unable to say her name, but when I said is your name Monique, she could 
recognize it easily out of multiple choices.  She was able to say simple 
phrases such as hi and thank you, but could not name any objects.  Cranial 
nerves II through XII were nonfocal other than she had right facial weakness.  
It was difficult to assess whether she had a visual field deficit.  She did not 
have any obvious ones to threat.  She had as best I can tell close to 5/5 
strength on the left and I think on the right at least 4/5 in the arm and hand.
  She moved her left leg when I said wiggle toes, but did not move her right 
leg.

 

DIAGNOSTIC STUDIES/LAB DATA:  I reviewed her CT scan, which showed some white 
matter disease and a hypodensity in the left posterior parietal lobe consistent 
with a probable recent stroke.  It was not compared to prior studies by the 
radiologist.  I looked at a CT scan from August 2016.  There was stroke in this 
area of the brain, but it was not perhaps as pronounced.

 

She had an EEG, which did not show any seizure activity.  There was some 
diffuse slowing as well as some apparent worsening of hypolucency.

 

She had a normal CBC.  Normal CMP other than a creatinine of 1.44, BUN 36. 
Troponin 0.06.  TSH 7.87.  UA had 2+ protein, leuk esterase 2+, bacteria 3+.  
Urine tox screen was negative.

 

IMPRESSION AND PLAN:  When I was called about the patient, it was told that the 
patient was globally encephalopathic.  I think that the primary finding here is 
a worse aphasia possibly from extension of stroke.  It seems like there was 
mild worsening yesterday and worse today.  We had recommended getting a CTA to 
make sure that there was no brainstem infarct.  I think that this is less likely
, but it is hard to know whether there was a global encephalopathy.  In addition
, I also think that she is unlikely to be a good candidate for clot retrieval 
since this may have been present since yesterday, but it is unclear in the 
setting of significant worsening.  The patient is off at CTA now and I had a 
call in to the medical care providers to see if they wanted to go through with 
the CTA.  The daughter who was here thought it was reasonable to do until we 
checked with the medical care providers.  MRI scan would be useful in assessing 
whether there is further stroke in the area.  She also has evidence for 
possible urinary tract infection that may have exacerbated her underlying 
confusion and aphasia, and I will discuss this with the hospitalist but defer 
to them in terms of optimum treatment for this.

 

Thank you for sharing her case.

 

 [id number]/CPS #: [id number]

RODRIGUEZ

## 2019-11-19 NOTE — HP
CC:  Dr. Maryam Dodson [facility] *

 

HISTORY AND PHYSICAL:

 

DATE OF ADMISSION:  11/19/19

 

PRIMARY CARE PROVIDER:  Dr. Maryam Dodson [facility].

 

ATTENDING PHYSICIAN:  Dr. Lorenzo Byrd * (dictated by JAILENE Person).

 

CHIEF COMPLAINT:  Unresponsiveness.

 

HISTORY OF PRESENT ILLNESS:  Ms. Pichardo is a 74-year-old female with past 
medical history of diabetes mellitus, insulin dependent; atrial fibrillation, 
on anticoagulation; hypertension; hyperlipidemia; history of CVA, who presented 
to the ER today unresponsive.  Family is at the bedside and relay most of her 
history. The patient is a resident of [facility].  She was found to be 
responsive only to pain this morning and was sent to the emergency room.  The 
patient's daughter who is at bedside states that the patient is typically alert 
and oriented and was noted to be somewhat confused yesterday.  This morning, 
she was unresponsive except to pain stimulus.  The patient had recently been 
started on baclofen for muscle spasms approximately 2 days ago.  She has been 
congested with wheezing and cold symptoms for approximately 1 week.  Again 
yesterday, she became confused.  Her daughter notes that she could not use a 
straw properly.  This morning, she was unresponsive and was sent to the ER.

 

In the ER, the patient received a full workup which revealed a creatinine of 
1.44 which is within her baseline.  A troponin was elevated to 0.08.  TSH and 
free T4 are both elevated. Urinalysis was performed and revealed 2+ leukocyte 
esterase, 3+ bacteria.  The patient was given 1 g ceftriaxone for this.  Urine 
drug screen and blood alcohol were negative.  Chest x-ray was negative for 
cardiopulmonary disease. EKG revealed sinus bradycardia with a rate of 44 and 
an elevated NH interval.  A brain CT was performed and revealed a wedge shaped 
defect in the left posterior parietal lobe suggestive of evolving infarct.  The 
hospitalist team was asked to further evaluate the patient for admission.

 

PAST MEDICAL HISTORY:

1.  Diabetes mellitus, insulin dependent.

2.  Hypertension.

3.  Hyperlipidemia.

4.  Atrial fibrillation, on anticoagulation.

5.  Coronary artery disease.

6.  Hypothyroidism.

7.  Chronic kidney disease.

8.  Anemia.

9.  Asthma.

10.  Obstructive sleep apnea.

11.  GERD.

12.  Chronic back pain.

13.  Arthritis.

14.  History of CVA with right-sided sequelae and aphasia.

15.  Anxiety, depression.

 

PAST SURGICAL HISTORY:  Right knee, L3-L4 laminectomy, left total knee 
arthroplasty, left ankle ORIF.

 

HOME MEDICATIONS:

1.  Amiodarone 200 mg p.o. daily.

2.  Apixaban 5 mg p.o. b.i.d.

3.  Ascorbic acid 500 mg p.o. b.i.d.

4.  Atorvastatin 20 mg p.o. at bedtime.

5.  Baclofen 10 mg p.o. t.i.d. p.r.n. muscle spasm.

6.  Dulcolax suppository 10 mg NH q.24 hours.

7.  Calazime cream 1 application topically q.6 hours.

8.  Cholecalciferol 50,000 units p.o. monthly.

9.  Diclofenac 1% gel 2 g topically q.i.d.

10.  Ferrous sulfate 325 mg p.o. daily.

11.  Furosemide 20 mg p.o. daily.

12.  Guaifenesin 5 mL p.o. q.6 hours p.r.n.

13.  Insulin glargine 28 units subcu q.p.m.

14.  Insulin lispro 4 units subcu q.a.m., 6 units a.c. breakfast, a.c. dinner.

15.  Levothyroxine 175 mcg p.o. daily.

16.  Magnesium hydroxide 30 mL p.o. q.6 hours p.r.n. constipation.

17.  Metoprolol succinate 25 mg p.o. daily.

18.  Polyethylene glycol 17 g p.o. every other day p.r.n.

19.  Tramadol 50 mg p.o. q.6 hours p.r.n.

 

DRUG ALLERGIES:  RIVAROXABAN, bleeding; HYDROMORPHONE, altered mental status.

 

FAMILY HISTORY:  Obtained from chart.  Father had lung cancer.  Brother had 
brain cancer.  Positive family history for hypertension.

 

SOCIAL HISTORY:  The patient quit using tobacco in 2003.  Prior to that, she 
has a 30-pack-year history.  She rarely drinks alcohol.  She is a retired 
business owner. She lives at [facility].  In the event that she is unable to 
make her own medical decisions, she has appointed her brother and healthcare 
proxy, Matt Salmeron, to be her surrogate decision maker.  Phone number [phone]
[phone], [phone].

 

REVIEW OF SYSTEMS:  Unable to obtain review of systems as the patient is 
unresponsive to questions, only responsive to pain stimulus.

 

                               PHYSICAL EXAMINATION

 

GENERAL:  Ms. Pichardo is a well-developed, well-nourished, obese older white 
woman who is lying in bed.  She is asleep.  She responds only to pain stimulus, 
but does not wake or open her eyes.

 

HEENT:  PERRL.  Nonicteric sclerae.  Oral mucous membranes are mildly dry.  
There are no lesions.  The pharynx is clear.

 

PULMONARY:  Breath sounds clear to auscultation bilaterally anteriorly.  The 
patient unable to follow instructions for deep breathing.  No noted wheeze, 
rhonchi, or rales.

 

CARDIOVASCULAR:  Sinus bradycardia.  S1, S2 present.  No murmurs, rubs, clicks, 
or gallops.  There is no peripheral edema.

 

ABDOMEN:  Obese.  Bowel sounds in all quadrants.  Soft.  There appears to be no 
tenderness to palpation.

 

NEUROLOGIC:  The patient is responsive only to pain.  She does not answer 
orientation questions.  Unable to evaluate cranial nerves.  Pupils are equally 
round and responsive to light.

 

 DIAGNOSTIC STUDIES/LAB DATA:

1.  EKG, sinus bradycardia, rate 44 with prolonged NH interval.

2.  Chest x-ray, impression:  Low lung volumes, no active cardiopulmonary 
disease.

3.  CT brain without, impression:  Wedge shaped defect in the left posterior 
parietal lobe suggestive of evolving infarct.  Chronic ischemic white matter 
changes noted.

 

CBC within normal limits.  BUN 36, creatinine 1.44, glucose 136.  Troponin 
0.08. TSH 7.87, free T4 of 1.49.

 

ASSESSMENT AND PLAN:  Ms. Pichardo is a 74-year-old female with past medical 
history of diabetes mellitus, insulin dependent; atrial fibrillation, on 
anticoagulation; hypertension; hyperlipidemia; history of cerebrovascular 
accident, who presented to the ER today unresponsive and was found to have a 
possible evolving infarct on CT of the brain.  She will be admitted for:

 

1.  Unresponsive episode.  The patient was noted to be confused yesterday and 
unresponsive this morning.  She continues to be unresponsive except to pain 
stimulus.  A CT of the brain reveals wedge shaped defect suggestive of evolving 
infarct.  Neurology has been consulted and recommended CTA of the head and neck 
and EEG which have all been ordered.  The patient recently started baclofen 
which will be discontinued. Urinalysis is suggestive of urinary tract infection
, which will continue to be treated.  We will await imaging for further 
management.

2.  Urinary tract infection.  The patient has UA with 2+ LE, 3+ bacteria, 
negative nitrites.  She has been given 1 dose of ceftriaxone 1 g in the ER, 
this will be continued at admission.

3.  Diabetes mellitus.  The patient uses glargine insulin 28 units q.p.m., will 
decrease to 10 units tonight in the setting of unresponsiveness and not eating. 
She will be placed on lispro sliding scale with fingersticks a.c. and h.s.

4.  Hypertension.  Continue home furosemide, metoprolol succinate.

5.  Atrial fibrillation.  Continue metoprolol, amiodarone, apixaban.

6.  Hyperlipidemia.  Continue statin.

7.  Anemia.  Continue ferrous sulfate.

8.  Hypothyroidism.  Continue levothyroxine.

9.  Asthma.  The patient does not appear to be on any inhalers.  She does not 
appear to be in acute exacerbation.  We will continue to monitor.

10.  DVT prophylaxis:  Continue home apixaban.

11.  Code status:  DNR/DNI.  MOLST has been updated.

 

TIME SPENT:  Approximately 60 minutes was spent on this admission, greater than 
half that time was spent face-to-face with the patient, her brother and her 
daughter obtaining history, performing physical, and reviewing the plan of care.

 

The case has been reviewed with my attending Dr. Byrd, who is in agreement with 
the plan of care.

 

 ____________________________________ 

JAILENE VAUGHN

 

[id number]/CPS #: [id number]

RODRIGUEZ

## 2019-11-20 LAB
ANION GAP SERPL CALC-SCNC: 5 MMOL/L (ref 2–11)
BASOPHILS # BLD AUTO: 0.1 10^3/UL (ref 0–0.2)
BUN SERPL-MCNC: 33 MG/DL (ref 6–24)
BUN/CREAT SERPL: 21.3 (ref 8–20)
CALCIUM SERPL-MCNC: 8.8 MG/DL (ref 8.6–10.3)
CHLORIDE SERPL-SCNC: 110 MMOL/L (ref 101–111)
EOSINOPHIL # BLD AUTO: 0.2 10^3/UL (ref 0–0.6)
GLUCOSE SERPL-MCNC: 122 MG/DL (ref 70–100)
HCO3 SERPL-SCNC: 27 MMOL/L (ref 22–32)
HCT VFR BLD AUTO: 33 % (ref 35–47)
HGB BLD-MCNC: 11.1 G/DL (ref 12–16)
LYMPHOCYTES # BLD AUTO: 1.1 10^3/UL (ref 1–4.8)
MCH RBC QN AUTO: 30 PG (ref 27–31)
MCHC RBC AUTO-ENTMCNC: 34 G/DL (ref 31–36)
MCV RBC AUTO: 90 FL (ref 80–97)
MONOCYTES # BLD AUTO: 0.7 10^3/UL (ref 0–0.8)
NEUTROPHILS # BLD AUTO: 7.2 10^3/UL (ref 1.5–7.7)
NRBC # BLD AUTO: 0 10^3/UL
NRBC BLD QL AUTO: 0
PLATELET # BLD AUTO: 204 10^3/UL (ref 150–450)
POTASSIUM SERPL-SCNC: 4.6 MMOL/L (ref 3.5–5)
RBC # BLD AUTO: 3.69 10^6 /UL (ref 3.7–4.87)
SODIUM SERPL-SCNC: 142 MMOL/L (ref 135–145)
WBC # BLD AUTO: 9.1 10^3/UL (ref 3.5–10.8)

## 2019-11-20 PROCEDURE — 4A10X4Z MONITORING OF CENTRAL NERVOUS ELECTRICAL ACTIVITY, EXTERNAL APPROACH: ICD-10-PCS | Performed by: PSYCHIATRY & NEUROLOGY

## 2019-11-20 RX ADMIN — AMIODARONE HYDROCHLORIDE SCH MG: 200 TABLET ORAL at 12:23

## 2019-11-20 RX ADMIN — METOPROLOL SUCCINATE SCH: 25 TABLET, EXTENDED RELEASE ORAL at 09:12

## 2019-11-20 RX ADMIN — AMIODARONE HYDROCHLORIDE SCH: 200 TABLET ORAL at 09:12

## 2019-11-20 RX ADMIN — CEFTRIAXONE SODIUM SCH MLS/HR: 1 INJECTION, POWDER, FOR SOLUTION INTRAVENOUS at 13:28

## 2019-11-20 RX ADMIN — INSULIN LISPRO SCH UNITS: 100 INJECTION, SOLUTION INTRAVENOUS; SUBCUTANEOUS at 12:23

## 2019-11-20 RX ADMIN — INSULIN LISPRO SCH: 100 INJECTION, SOLUTION INTRAVENOUS; SUBCUTANEOUS at 09:12

## 2019-11-20 RX ADMIN — ATORVASTATIN CALCIUM SCH MG: 20 TABLET, FILM COATED ORAL at 22:03

## 2019-11-20 RX ADMIN — INSULIN LISPRO SCH UNITS: 100 INJECTION, SOLUTION INTRAVENOUS; SUBCUTANEOUS at 17:02

## 2019-11-20 RX ADMIN — INSULIN GLARGINE SCH UNITS: 100 INJECTION, SOLUTION SUBCUTANEOUS at 17:02

## 2019-11-20 RX ADMIN — APIXABAN SCH MG: 5 TABLET, FILM COATED ORAL at 22:02

## 2019-11-20 RX ADMIN — SODIUM CHLORIDE SCH MLS/HR: 900 IRRIGANT IRRIGATION at 17:02

## 2019-11-20 RX ADMIN — INSULIN LISPRO SCH UNITS: 100 INJECTION, SOLUTION INTRAVENOUS; SUBCUTANEOUS at 22:02

## 2019-11-20 RX ADMIN — FUROSEMIDE SCH: 20 TABLET ORAL at 09:12

## 2019-11-20 NOTE — EEG
ELECTROENCEPHALOGRAPHY:

 

DATE OF STUDY: 11/20/19- ROOM #453



DATE OF DICTATION:  11/20/19

 

PATIENT OF:  JAILENE Person

 

CLINICAL PROBLEM:  This is a 74-year-old woman being evaluated for either 
encephalopathy or an aphasia.  This study was done to evaluate for seizures 
versus encephalopathy.

 

MEDICATIONS:  Include Rocephin.

 

REPORT:  With the patient in bed encephalopathic and aphasic, background 
consists of admixed delta and theta activity with left hemispheric slowing more 
pronounced than on the right.  There is some sharp wave activity seen at times 
bilaterally, but more noted in the left temporal head region.  No subclinical 
seizures are noted.

 

CLINICAL IMPRESSION:  This EEG is abnormal because of diffuse slowing of 
background with a major asymmetry with left hemispheric slowing as well as 
sharp activity suggesting underlying structural lesion such as the past stroke, 
but also suggesting a predisposition to a seizure disorder.

 

[id number]/CPS #: [id number]

MTDD

## 2019-11-20 NOTE — PN
Subjective


Date of Service: 11/20/19


Interval History: 





Ms. Pichardo is feeling okay today. She offers no complaints, though is moderately 

aphasic, so conversation is difficult. She denies CP, SOB, abdominal pain. 

Unclear if she knows where she is as speech was incoherent.





Nursing reported patient was essentially unresponsive this morning, but woke 

later in the day and was oriented. Since that time mood and neuro deficits have 

waxed and waned.





Family History: Unchanged from Admission


Social History: Unchanged from Admission


Past Medical History: Unchanged from Admission





Objective


Active Medications: 





Amiodarone HCl (Cordarone Tab*)  200 mg PO DAILY JOSE


Apixaban (Eliquis*)  5 mg PO BID JOSE


Atorvastatin Calcium (Lipitor*)  20 mg PO BEDTIME JOSE


Dextrose (Dextrose 50% Vial 50 Ml*)  25 ml IV PUSH .FOR FS < 60 - SS PRN FS < 60


Furosemide (Lasix Tab*)  20 mg PO DAILY JOSE


Ceftriaxone Sodium 1 gm/ (Sodium Chloride)  50 mls @ 100 mls/hr IVPB Q24H JOSE


Insulin Glargine (Lantus(*))  10 units SUBCUT QPM JOSE


Insulin Human Lispro (Humalog*)  0 units SUBCUT ACHS JOSE; Protocol


Levothyroxine Sodium (Synthroid Tab*)  175 mcg PO 0600 JOSE


Metoprolol Succinate (Toprol Xl Tab*)  25 mg PO DAILY [facility]





 Vital Signs - 8 hr











  11/20/19





  07:15


 


Temperature 98.2 F


 


Pulse Rate 51


 


Respiratory 24





Rate 


 


Blood Pressure 134/30





(mmHg) 


 


O2 Sat by Pulse 95





Oximetry 











Oxygen Devices in Use Now: None


Appearance: Elderly female lying in bed in NAD


Ears/Nose/Mouth/Throat: Mucous Membranes Moist


Neck: NL Appearance and Movements; NL JVP, Trachea Midline


Respiratory: Symmetrical Chest Expansion and Respiratory Effort, Clear to 

Auscultation


Cardiovascular: NL Sounds; No Murmurs; No JVD


Abdominal: NL Sounds; No Tenderness; No Distention


Extremities: No Edema


Neurological: - - Wakes to voice, orientation unclear, expressive aphasia


Lines/Tubes/Other Access: Clean, Dry and Intact Peripheral IV


Nutrition: Taking PO's


Result Diagrams: 


 11/20/19 06:23





 11/20/19 06:23





Assess/Plan/Problems-Billing





Assessment: 





Ms. Pichardo is a 73 yo F with PMH of DM, HTN, HLD, afib on AC, CAD, CKD, 

hypothyroidism, asthma, CHERISE, chronic pain, CVA with residual R sided 

hemiparesis and aphasia; who presented to the ED d/t unresponsiveness and was 

found to have a UTI.





- Patient Problems


(1) Encephalopathy


Code(s): G93.40 - ENCEPHALOPATHY, UNSPECIFIED   Comment: 


- With recrudescence of stroke symptoms, suspected secondary to UTI


- Appreciate Neuro consult


- Supportive care


- Plan as above   





(2) UTI (urinary tract infection)


Comment: 


- Culture pending


- Continue ceftriaxone   





(3) History of CVA (cerebrovascular accident)


Code(s): Z86.73 - PRSNL HX OF TIA (TIA), AND CEREB INFRC W/O RESID DEFICITS   

Comment: 


- Residual right sided hemiparesis and aphasia


- Continue Eliquis, atorvastatin   





(4) Afib


Code(s): I48.91 - UNSPECIFIED ATRIAL FIBRILLATION   Comment: 


- Continue amiodarone, Eliquis, metoprolol   





(5) Diabetes


Code(s): E11.9 - TYPE 2 DIABETES MELLITUS WITHOUT COMPLICATIONS   Comment: 


- Continue Lantus, Lispro SS   





(6) Hypertension


Code(s): I10 - ESSENTIAL (PRIMARY) HYPERTENSION   Comment: 


- Normotensive


- Continue metoprolol, furosemide   





(7) Hypothyroidism


Code(s): E03.9 - HYPOTHYROIDISM, UNSPECIFIED   Comment: 


- TSH elevated, but it is unclear if dosing was recently changed or if patient 

is compliant with medication


- Continue levothyroxine   





(8) DVT prophylaxis


Comment: 


- Eliquis   





(9) DNR (do not resuscitate)


Comment: 


   


Status and Disposition: 





Observation. Anticipate d/c home when medically stable.





Attending: Ari Schwab

## 2019-11-20 NOTE — PN
NEUROLOGICAL FOLLOWUP NOTE:

 

DATE OF SERVICE:  11/20/19

 

HISTORY:  This is a neurological followup of this 74-year-old woman who had 
worsening stroke-like symptoms yesterday.  She is much better today, but will 
fluctuate from her baseline, which is mild aphasia and right-sided weakness to 
being having slightly worse aphasia.  She also has lability of mood.

 

MEDICATIONS:  Include:

1.  Eliquis.

2.  Amiodarone.

3.  Lipitor.

4.  Ceftriaxone.

5.  Lasix.

6.  Lantus.

7.  Synthroid.

8.  Metoprolol.

 

REVIEW OF SYSTEMS:  She has no complaints, although she gets depressed at times 
and has had some depression before.

 

PHYSICAL EXAMINATION:  Temperature 98.2, pulse 51, respirations 24, blood 
pressure 125/109 and currently 134/30.  She is alert and oriented, has some 
mild aphasia, but clearly better than yesterday.  She can say short sentences 
and has difficulty naming objects.  She has 4/5 strength in her right arm and 
hand, which is apparently similar to what she has had in the past.  She has 
emotional lability. Chest:  Clear.  Cardiovascular:  Irregular rate and rhythm.
  Abdomen is soft with positive bowel sounds.

 

DIAGNOSTIC STUDIES/LAB DATA:  I reviewed her MRI scan, which showed her old 
stroke, but no new findings.

 

Her MRI was not helpful.

 

White count 9 today, hematocrit 33.  BUN 13, creatinine 1.55.

 

IMPRESSION AND PLAN:  I discussed with the family in length that most likely 
she has had worsening neurological status due to systemic reasons such as her 
presumed urinary tract infection and most likely she will make progress if this 
is treated. I would not change her anticoagulation at this point.  It would be 
reasonable to check an LDL and make sure that LDL is below 70 given her prior 
stroke, but not for anything current.

 

I discussed with family that I will be glad to see as need arises.  We also 
discussed with her and the family whether she would want clot retrieval in the 
future if she had a large vessel occlusion and it is clinically appropriate.  
They have discussed this with her and she has indicated that she would want 
that done if it was thought it was clearly medically indicated.  I will be glad 
to see her back as need arises and I have a call in to the hospitalist about 
her blood pressures.

 

Thank you for sharing her case.

 

 [id number]/CPS #: [id number]

RODRIGUEZ

## 2019-11-21 RX ADMIN — INSULIN LISPRO SCH UNITS: 100 INJECTION, SOLUTION INTRAVENOUS; SUBCUTANEOUS at 16:31

## 2019-11-21 RX ADMIN — APIXABAN SCH MG: 5 TABLET, FILM COATED ORAL at 08:28

## 2019-11-21 RX ADMIN — ATORVASTATIN CALCIUM SCH MG: 20 TABLET, FILM COATED ORAL at 20:37

## 2019-11-21 RX ADMIN — SODIUM CHLORIDE SCH MLS/HR: 900 IRRIGANT IRRIGATION at 05:39

## 2019-11-21 RX ADMIN — INSULIN LISPRO SCH UNITS: 100 INJECTION, SOLUTION INTRAVENOUS; SUBCUTANEOUS at 12:15

## 2019-11-21 RX ADMIN — INSULIN GLARGINE SCH UNITS: 100 INJECTION, SOLUTION SUBCUTANEOUS at 17:30

## 2019-11-21 RX ADMIN — AMIODARONE HYDROCHLORIDE SCH MG: 200 TABLET ORAL at 08:26

## 2019-11-21 RX ADMIN — METOPROLOL SUCCINATE SCH MG: 25 TABLET, EXTENDED RELEASE ORAL at 08:28

## 2019-11-21 RX ADMIN — CEFTRIAXONE SODIUM SCH MLS/HR: 1 INJECTION, POWDER, FOR SOLUTION INTRAVENOUS at 13:19

## 2019-11-21 RX ADMIN — APIXABAN SCH MG: 5 TABLET, FILM COATED ORAL at 20:37

## 2019-11-21 RX ADMIN — INSULIN LISPRO SCH UNITS: 100 INJECTION, SOLUTION INTRAVENOUS; SUBCUTANEOUS at 20:36

## 2019-11-21 RX ADMIN — INSULIN LISPRO SCH UNITS: 100 INJECTION, SOLUTION INTRAVENOUS; SUBCUTANEOUS at 08:19

## 2019-11-21 RX ADMIN — LEVOTHYROXINE SODIUM SCH MCG: 175 TABLET ORAL at 05:38

## 2019-11-21 RX ADMIN — FUROSEMIDE SCH MG: 20 TABLET ORAL at 08:26

## 2019-11-21 NOTE — PN
Subjective


Date of Service: 11/21/19


Interval History: 





Ms. Pichardo is feeling better today. She does not feel confused, but her brother 

at bedside believes she is still slightly confused, though generally improved. 

She can state that she does have a history of aphasia, but does remember having 

episode of worsening aphasia yesterday. Denies CP, SOB, N/V, neuro deficits. 





No concerns from nursing.





Family History: Unchanged from Admission


Social History: Unchanged from Admission


Past Medical History: Unchanged from Admission





Objective


Active Medications: 





Amiodarone HCl (Cordarone Tab*)  200 mg PO DAILY JOSE


Apixaban (Eliquis*)  5 mg PO BID JOSE


Atorvastatin Calcium (Lipitor*)  20 mg PO BEDTIME JOSE


Dextrose (Dextrose 50% Vial 50 Ml*)  25 ml IV PUSH .FOR FS < 60 - SS PRN FS < 60


Furosemide (Lasix Tab*)  20 mg PO DAILY [facility]


Ceftriaxone Sodium 1 gm/ (Sodium Chloride)  50 mls @ 100 mls/hr IVPB Q24H [facility]


Sodium Chloride (Ns 0.9% 1000 Ml**)  1,000 mls @ 100 mls/hr IV PER RATE [facility]


Insulin Glargine (Lantus(*))  10 units SUBCUT QPM JOSE


Insulin Human Lispro (Humalog*)  0 units SUBCUT ACHS JOSE; Protocol


Levothyroxine Sodium (Synthroid Tab*)  175 mcg PO 0600 JOSE


Metoprolol Succinate (Toprol Xl Tab*)  25 mg PO DAILY [facility]





 Vital Signs - 8 hr











  11/21/19 11/21/19





  08:23 12:01


 


Temperature 98.9 F 99 F


 


Pulse Rate 69 56


 


Respiratory 17 20





Rate  


 


Blood Pressure 152/47 165/52





(mmHg)  


 


O2 Sat by Pulse 95 94





Oximetry  











Oxygen Devices in Use Now: None


Appearance: Elderly female sitting in bed in NAD


Ears/Nose/Mouth/Throat: Mucous Membranes Moist


Neck: NL Appearance and Movements; NL JVP, Trachea Midline


Respiratory: Symmetrical Chest Expansion and Respiratory Effort, Clear to 

Auscultation


Cardiovascular: NL Sounds; No Murmurs; No JVD, - - Irregular


Abdominal: NL Sounds; No Tenderness; No Distention


Neurological: - - Oriented to self and place; occasional expressive aphasia


Lines/Tubes/Other Access: Clean, Dry and Intact Peripheral IV


Nutrition: Taking PO's


Result Diagrams: 


 11/20/19 06:23





 11/20/19 06:23





Assess/Plan/Problems-Billing





Assessment: 





Ms. Pichardo is a 73 yo F with PMH of DM, HTN, HLD, afib on AC, CAD, CKD, 

hypothyroidism, asthma, CHERISE, chronic pain, CVA with residual R sided 

hemiparesis and aphasia; who presented to the ED d/t unresponsiveness and was 

found to have a UTI.





- Patient Problems


(1) UTI (urinary tract infection)


Comment: 


- Culture growing >100k colonies E. coli


- Continue ceftriaxone (day 3/7)   





(2) Encephalopathy


Code(s): G93.40 - ENCEPHALOPATHY, UNSPECIFIED   Comment: 


- With recrudescence of stroke symptoms, suspected secondary to UTI


- Significantly improved from yesterday, almost at baseline


- Appreciate Neuro consult; EEG unremarkable for seizure activity, but shows 

likely predisposition to seizures d/t prior CVA


- Supportive care   





(3) History of CVA (cerebrovascular accident)


Code(s): Z86.73 - PRSNL HX OF TIA (TIA), AND CEREB INFRC W/O RESID DEFICITS   

Comment: 


- Residual right sided hemiparesis and aphasia


- Continue Eliquis, atorvastatin   





(4) Afib


Code(s): I48.91 - UNSPECIFIED ATRIAL FIBRILLATION   Comment: 


- Continue amiodarone, Eliquis, metoprolol   





(5) Diabetes


Code(s): E11.9 - TYPE 2 DIABETES MELLITUS WITHOUT COMPLICATIONS   Comment: 


- Continue Lantus, Lispro SS   





(6) Hypertension


Code(s): I10 - ESSENTIAL (PRIMARY) HYPERTENSION   Comment: 


- Mostly normotensive


- Continue metoprolol, furosemide   





(7) Hypothyroidism


Code(s): E03.9 - HYPOTHYROIDISM, UNSPECIFIED   Comment: 


- TSH elevated, but it is unclear if dosing was recently changed; will defer to 

PCP at [facility]


- Continue levothyroxine   





(8) DVT prophylaxis


Comment: 


- Eliquis   





(9) DNR (do not resuscitate)


Comment: 


   


Status and Disposition: 





Observation. Anticipate d/c back to [facility] when medically stable.





Attending: Ari Schwab

## 2019-11-22 VITALS — DIASTOLIC BLOOD PRESSURE: 85 MMHG | SYSTOLIC BLOOD PRESSURE: 111 MMHG

## 2019-11-22 LAB
CHOLEST SERPL-MCNC: 116 MG/DL
HDLC SERPL-MCNC: 38.2 MG/DL
TRIGL SERPL-MCNC: 82 MG/DL

## 2019-11-22 RX ADMIN — AMIODARONE HYDROCHLORIDE SCH MG: 200 TABLET ORAL at 09:24

## 2019-11-22 RX ADMIN — INSULIN LISPRO SCH: 100 INJECTION, SOLUTION INTRAVENOUS; SUBCUTANEOUS at 09:22

## 2019-11-22 RX ADMIN — LEVOTHYROXINE SODIUM SCH MCG: 175 TABLET ORAL at 06:01

## 2019-11-22 RX ADMIN — FUROSEMIDE SCH MG: 20 TABLET ORAL at 09:25

## 2019-11-22 RX ADMIN — INSULIN LISPRO SCH: 100 INJECTION, SOLUTION INTRAVENOUS; SUBCUTANEOUS at 14:41

## 2019-11-22 RX ADMIN — METOPROLOL SUCCINATE SCH: 25 TABLET, EXTENDED RELEASE ORAL at 09:25

## 2019-11-22 RX ADMIN — APIXABAN SCH MG: 5 TABLET, FILM COATED ORAL at 09:25

## 2019-11-22 NOTE — DS
CC:  Dr. Maryam Dodson, [facility] *

 

DATE OF ADMISSION:  11/19/2019.

 

DATE OF DISCHARGE:  11/22/2019.

 

PRIMARY CARE PHYSICIAN:  Dr. Maryam Dodson.

 

ATTENDING PHYSICIAN:  Dr. Ari Schwab * (dictated by Kayley Lynn NP).

 

PRIMARY DIAGNOSES:

1.  Urinary tract infection, E. coli.

2.  Metabolic encephalopathy causing recrudescence of stroke symptoms.

 

SECONDARY DIAGNOSES:

1.  Atrial fibrillation.

2.  Diabetes mellitus type 2.

3.  Hypertension.

4.  Hyperthyroidism.

 

STUDIES WHILE IN THE HOSPITAL:

1.  Chest x-ray on 11/19/2019, reads as:  Low lung volumes.  No active 
cardiopulmonary disease.

2.  EKG on 11/19/2019 shows sinus bradycardia with a rate of 44, .  This 
EKG appears consistent with previous EKG on file from earlier this year.

3.  Brain CT on 11/19/2019, reads as:  Wedge-shaped defect in the left 
posterior parietal lobe suggestive of evolving infarct.  Chronic ischemic white 
matter change is noted.

4.  Head CTA on 11/19/2019, reads as:  Limited study.  Consider repeat CT 
angiogram imaging, MRA imaging, or carotid ultrasound for further evaluation.  
Limited evaluation of the proximal internal carotid arteries as noted in the 
body of the report.  The possibility of bilateral high-grade stenoses cannot be 
excluded. Hypodense area in the left posterior parietal lobe suggestive of a 
subacute less likely chronic infarct.

5.  Brain MRI on 11/19/2019, reads as:  No acute intracranial abnormality. 
Moderate chronic microvascular ischemic changes.

6.  Neck MRA on 11/19/2019, reads as:  Suboptimal examination secondary to 
motion artifact.  No obvious acute abnormality.

7.  EEG on 11/20/2019, reads as:  This EEG is abnormal because of diffuse 
slowing of background with a major asymmetry with left hemispheric slowing as 
well as sharp activity suggesting underlying structural lesion such as the past 
stroke, but also suggesting a predisposition to a seizure disorder.

 

CONSULTATIONS WHILE IN THE HOSPITAL:  Dr. Dickens from Neurology on 11/19/2019.

 

HISTORY OF PRESENT ILLNESS/HOSPITAL COURSE:  Ms. Pichardo is a 74-year-old female 
with a past medical history of insulin-dependent diabetes, hypertension, 
hyperlipidemia, A-fib on anticoagulation, coronary artery disease, 
hypothyroidism, chronic kidney disease, anemia, asthma, obstructive sleep apnea
, GERD, chronic pain, and somewhat recent CVA with right-sided hemiparesis and 
aphasia who present to the emergency room on 11/19/2019 due to 
unresponsiveness.  Please see the history and physical by JAILENE Person 
for a complete summary of the events leading up to this hospitalization.  In 
short, the patient resides at [facility].  In the morning on the day of 
admission, she was found to be responsive only to painful stimuli and so was 
brought to the emergency room.  In the emergency room, she was noted to have an 
elevated creatinine consistent with her baseline.  She was noted to have an 
elevated troponin, although no EKG changes or obvious chest pain.  TSH was 
noted to be elevated.  Urinalysis was indicative of a urinary tract infection.  
Because of her mental status, Neurology consulted in the emergency room.  At 
that time, it was felt as though her mental status was potentially secondary to 
UTI symptoms.  She was admitted by the Hospitalist Service.

 

The day after admission, on 11/20/2019, the patient's mental status waxed and 
waned.  She had labile moods, though was generally somewhat improved.  As noted 
on imaging above, there is no evidence of a new CVA, so it was felt that her 
encephalopathy was secondary to UTI and recrudescence of previous stroke 
symptoms. Dr. Dickens saw the patient again and also felt as though this was 
likely secondary to UTI.  Of note, the patient was also recently started on 
Baclofen approximately two days prior to admission to the hospital.  She was 
treated with Ceftriaxone for her urinary tract infection and continued on her 
usual medications.  She was additionally noted to have an elevated TSH on 
admission at 7.87 and is currently taking Levothyroxine.  The next day, the 
patient's mental status eventually returned to baseline.  Her brother was at 
the bedside and did feel as though she was mostly at baseline, although was 
still slightly somewhat confused.  Of note, she does still have some expressive 
aphasia at baseline, though as of yesterday this was not any worse than usual.  
Urine culture did ultimately grow greater than 100,000 colonies of E. coli 
resistant only to penicillin.  As of this morning, the patient reports feeling 
well.  She offers no complaints.  She was seen sitting in bed eating breakfast 
and is able to carry on a conversation with some mild aphasia.

 

On exam, she is alert and oriented to self, place, and situation.  
Neurologically, she does have right-sided hemiparesis which is at her baseline 
as well as some right-sided numbness which is also at her baseline.  Her heart 
has a regular rhythm and she remains bradycardic.  There are no murmurs, rubs, 
or gallops.  Lungs are clear to auscultation with rhonchi, wheezes, or rubs.  
There is mild to moderate nonpitting edema in the lower extremities.  Abdomen 
is soft, nontender.

 

Ms. Pichardo is stable for discharge today.  Most recent vitals are as follows: 
Temperature 98.2, heart rate 49, respiratory rate 20, oxygen saturation 97 
percent on room air, blood pressure 111/85.

 

DISCHARGE MEDICATIONS: New medications:  

1.  Cephalexin 500 mg p.o. b.i.d. times 4 days.

 

Changed medications:  



2.  Glargine 20 units subcu every night (previously was 28 units).

 

Continued medications:

1.  Amiodarone 200 mg p.o. daily.

2.  Atorvastatin 20 mg p.o. at bedtime.

3.  Furosemide 20 mg p.o. daily.

4.  Metoprolol Succinate 25 mg p.o. daily.

5.  Acetaminophen 650 mg p.o. q.6 hours prn fever or pain.

6.  Eliquis 5 mg p.o. b.i.d.

7.  Ascorbic acid 500 mg p.o. b.i.d.

8.  Dulcolax suppository 10 mg p.r. q.24 hours prn constipation.

9.  Cholecalciferol 50,000 units monthly.

10. Diclofenac 1% gel one application topically q.i.d.

11. Ferrous Sulfate 325 mg p.o. daily.

12. Guaifenesin 5 ml p.o. q.6 hours prn cough.

13. Humalog 4 units subcu every morning.

14. Humalog 6 units subcu in the afternoon and evening.

15. Levothyroxine 155 mcg p.o. daily.

16. Milk of Magnesia 30 ml p.o. q.6 hours prn constipation.

17. MiraLax 17 gm p.o. every other day prn constipation.

18. Tramadol 50 mg p.o. q.6 hours prn pain.

 

Discontinued medications:  Baclofen.

 

DISCHARGE PLAN:  Ms. Pichardo will be discharged back to [facility].

 

ACTIVITY:  As tolerated.  The patient is a Yu at baseline.  She can resume 
her usual physical therapy, occupational therapy, and speech therapy.

 

DIET:  Diabetic and heart healthy.

 

MEDICATIONS:  Medications are noted above.  The patient will need to complete 
four days of Cephalexin to complete a total of seven days of antibiotic therapy 
for her urinary tract infection.  I have discontinued the Baclofen as it is 
unclear if this did contribute at all to her symptoms on arrival.  She has not 
been on this while in the hospital and has improved, but that is also likely 
secondary to urinary tract infection, though at this point I think it is wise 
to hold this medication. She can continue her other usual medications as noted 
above with the exception of her Glargine.  I have decreased the dose of this 
slightly, as blood sugars have been well-controlled here in the hospital, a 
dose lower than her home dose.

 

The patient will need to follow-up with a provider at [facility] in the next 
four to seven days.

 

She should return to the emergency room or nearest hospital for any worsening 
of symptoms, shortness of breath, lightheadedness, dizziness, chest discomfort, 
high fevers, chills, night sweats, loss of consciousness, or any other 
worrisome signs or symptoms.

 

CONDITION ON DISCHARGE:  Stable.

 

DISCHARGE DISPOSITION:  Skilled nursing facility, [facility].

 

This is a summarized report of a complex medical history and hospital stay.  
For further details, please see the entire medical record.

 

TIME SPENT:  Approximately 50 minutes were spent on this discharge.

 

____________________________________ KAYLEY LYNN NP

 

[id number]/CPS #: [id number]

RODRIGUEZ